# Patient Record
Sex: FEMALE | Race: WHITE | Employment: OTHER | URBAN - METROPOLITAN AREA
[De-identification: names, ages, dates, MRNs, and addresses within clinical notes are randomized per-mention and may not be internally consistent; named-entity substitution may affect disease eponyms.]

---

## 2017-02-21 PROBLEM — Z98.42 STATUS POST BILATERAL CATARACT EXTRACTION: Status: ACTIVE | Noted: 2017-02-21

## 2017-02-21 PROBLEM — Z98.41 STATUS POST BILATERAL CATARACT EXTRACTION: Status: ACTIVE | Noted: 2017-02-21

## 2017-04-28 PROBLEM — Z79.899 ENCOUNTER FOR LONG-TERM (CURRENT) USE OF HIGH-RISK MEDICATION: Status: ACTIVE | Noted: 2017-04-28

## 2018-02-27 PROBLEM — J41.0 SIMPLE CHRONIC BRONCHITIS (HCC): Status: ACTIVE | Noted: 2018-02-27

## 2018-02-27 PROBLEM — S93.492A SPRAIN OF POSTERIOR TALOFIBULAR LIGAMENT OF LEFT ANKLE, INITIAL ENCOUNTER: Status: ACTIVE | Noted: 2018-02-27

## 2018-03-14 PROBLEM — E66.01 MORBID OBESITY (HCC): Status: ACTIVE | Noted: 2018-03-14

## 2018-03-14 PROBLEM — J41.0 SIMPLE CHRONIC BRONCHITIS (HCC): Status: RESOLVED | Noted: 2018-02-27 | Resolved: 2018-03-14

## 2018-07-28 ENCOUNTER — APPOINTMENT (OUTPATIENT)
Dept: INTERVENTIONAL RADIOLOGY/VASCULAR | Facility: HOSPITAL | Age: 78
DRG: 286 | End: 2018-07-28
Attending: INTERNAL MEDICINE
Payer: MEDICARE

## 2018-07-28 ENCOUNTER — APPOINTMENT (OUTPATIENT)
Dept: GENERAL RADIOLOGY | Facility: HOSPITAL | Age: 78
DRG: 286 | End: 2018-07-28
Attending: EMERGENCY MEDICINE
Payer: MEDICARE

## 2018-07-28 ENCOUNTER — HOSPITAL ENCOUNTER (INPATIENT)
Facility: HOSPITAL | Age: 78
LOS: 2 days | Discharge: HOME | DRG: 286 | End: 2018-07-30
Attending: EMERGENCY MEDICINE | Admitting: HOSPITALIST
Payer: MEDICARE

## 2018-07-28 DIAGNOSIS — I21.3 ST ELEVATION MYOCARDIAL INFARCTION (STEMI), UNSPECIFIED ARTERY (HCC): Primary | ICD-10-CM

## 2018-07-28 LAB
ANION GAP SERPL CALC-SCNC: 8 MMOL/L (ref 0–18)
APTT PPP: 23.6 SECONDS (ref 23.2–35.3)
BASOPHILS # BLD: 0.1 K/UL (ref 0–0.2)
BASOPHILS NFR BLD: 1 %
BNP SERPL-MCNC: 126 PG/ML (ref 0–100)
BUN SERPL-MCNC: 13 MG/DL (ref 8–20)
BUN/CREAT SERPL: 16.9 (ref 10–20)
CALCIUM SERPL-MCNC: 9.7 MG/DL (ref 8.5–10.5)
CHLORIDE SERPL-SCNC: 109 MMOL/L (ref 95–110)
CHOLEST SERPL-MCNC: 153 MG/DL (ref 110–200)
CO2 SERPL-SCNC: 22 MMOL/L (ref 22–32)
CREAT SERPL-MCNC: 0.77 MG/DL (ref 0.5–1.5)
EOSINOPHIL # BLD: 0.1 K/UL (ref 0–0.7)
EOSINOPHIL NFR BLD: 2 %
ERYTHROCYTE [DISTWIDTH] IN BLOOD BY AUTOMATED COUNT: 13.1 % (ref 11–15)
GLUCOSE SERPL-MCNC: 108 MG/DL (ref 70–99)
HCT VFR BLD AUTO: 42.7 % (ref 35–48)
HDLC SERPL-MCNC: 64 MG/DL
HGB BLD-MCNC: 14.2 G/DL (ref 12–16)
INR BLD: 1 (ref 0.9–1.2)
LDLC SERPL CALC-MCNC: 68 MG/DL (ref 0–99)
LYMPHOCYTES # BLD: 0.4 K/UL (ref 1–4)
LYMPHOCYTES NFR BLD: 7 %
MAGNESIUM SERPL-MCNC: 1.9 MG/DL (ref 1.8–2.5)
MCH RBC QN AUTO: 31.3 PG (ref 27–32)
MCHC RBC AUTO-ENTMCNC: 33.2 G/DL (ref 32–37)
MCV RBC AUTO: 94.2 FL (ref 80–100)
MONOCYTES # BLD: 0.8 K/UL (ref 0–1)
MONOCYTES NFR BLD: 13 %
NEUTROPHILS # BLD AUTO: 4.7 K/UL (ref 1.8–7.7)
NEUTROPHILS NFR BLD: 77 %
NONHDLC SERPL-MCNC: 89 MG/DL
OSMOLALITY UR CALC.SUM OF ELEC: 289 MOSM/KG (ref 275–295)
PLATELET # BLD AUTO: 145 K/UL (ref 140–400)
PMV BLD AUTO: 8.7 FL (ref 7.4–10.3)
POTASSIUM SERPL-SCNC: 4.2 MMOL/L (ref 3.3–5.1)
PROTHROMBIN TIME: 12.7 SECONDS (ref 11.8–14.5)
RBC # BLD AUTO: 4.53 M/UL (ref 3.7–5.4)
SODIUM SERPL-SCNC: 139 MMOL/L (ref 136–144)
TRIGL SERPL-MCNC: 104 MG/DL (ref 1–149)
TROPONIN I SERPL-MCNC: 3.06 NG/ML (ref ?–0.03)
WBC # BLD AUTO: 6.1 K/UL (ref 4–11)

## 2018-07-28 PROCEDURE — 93005 ELECTROCARDIOGRAM TRACING: CPT

## 2018-07-28 PROCEDURE — 87641 MR-STAPH DNA AMP PROBE: CPT | Performed by: HOSPITALIST

## 2018-07-28 PROCEDURE — 99291 CRITICAL CARE FIRST HOUR: CPT

## 2018-07-28 PROCEDURE — 85730 THROMBOPLASTIN TIME PARTIAL: CPT | Performed by: EMERGENCY MEDICINE

## 2018-07-28 PROCEDURE — 83735 ASSAY OF MAGNESIUM: CPT | Performed by: EMERGENCY MEDICINE

## 2018-07-28 PROCEDURE — 93010 ELECTROCARDIOGRAM REPORT: CPT | Performed by: EMERGENCY MEDICINE

## 2018-07-28 PROCEDURE — 93010 ELECTROCARDIOGRAM REPORT: CPT | Performed by: INTERNAL MEDICINE

## 2018-07-28 PROCEDURE — 84484 ASSAY OF TROPONIN QUANT: CPT | Performed by: EMERGENCY MEDICINE

## 2018-07-28 PROCEDURE — 99152 MOD SED SAME PHYS/QHP 5/>YRS: CPT

## 2018-07-28 PROCEDURE — 96375 TX/PRO/DX INJ NEW DRUG ADDON: CPT

## 2018-07-28 PROCEDURE — 80061 LIPID PANEL: CPT | Performed by: EMERGENCY MEDICINE

## 2018-07-28 PROCEDURE — B2151ZZ FLUOROSCOPY OF LEFT HEART USING LOW OSMOLAR CONTRAST: ICD-10-PCS | Performed by: INTERNAL MEDICINE

## 2018-07-28 PROCEDURE — 4A023N7 MEASUREMENT OF CARDIAC SAMPLING AND PRESSURE, LEFT HEART, PERCUTANEOUS APPROACH: ICD-10-PCS | Performed by: INTERNAL MEDICINE

## 2018-07-28 PROCEDURE — 85610 PROTHROMBIN TIME: CPT | Performed by: EMERGENCY MEDICINE

## 2018-07-28 PROCEDURE — 96361 HYDRATE IV INFUSION ADD-ON: CPT

## 2018-07-28 PROCEDURE — B2111ZZ FLUOROSCOPY OF MULTIPLE CORONARY ARTERIES USING LOW OSMOLAR CONTRAST: ICD-10-PCS | Performed by: INTERNAL MEDICINE

## 2018-07-28 PROCEDURE — 99153 MOD SED SAME PHYS/QHP EA: CPT

## 2018-07-28 PROCEDURE — 83880 ASSAY OF NATRIURETIC PEPTIDE: CPT | Performed by: EMERGENCY MEDICINE

## 2018-07-28 PROCEDURE — 85025 COMPLETE CBC W/AUTO DIFF WBC: CPT | Performed by: EMERGENCY MEDICINE

## 2018-07-28 PROCEDURE — 93458 L HRT ARTERY/VENTRICLE ANGIO: CPT

## 2018-07-28 PROCEDURE — 96374 THER/PROPH/DIAG INJ IV PUSH: CPT

## 2018-07-28 PROCEDURE — A4216 STERILE WATER/SALINE, 10 ML: HCPCS | Performed by: HOSPITALIST

## 2018-07-28 PROCEDURE — 93567 NJX CAR CTH SPRVLV AORTGRPHY: CPT

## 2018-07-28 PROCEDURE — 80048 BASIC METABOLIC PNL TOTAL CA: CPT | Performed by: EMERGENCY MEDICINE

## 2018-07-28 RX ORDER — PREDNISONE 1 MG/1
5 TABLET ORAL DAILY
Status: DISCONTINUED | OUTPATIENT
Start: 2018-07-29 | End: 2018-07-30

## 2018-07-28 RX ORDER — SODIUM CHLORIDE 9 MG/ML
INJECTION, SOLUTION INTRAVENOUS CONTINUOUS
Status: ACTIVE | OUTPATIENT
Start: 2018-07-28 | End: 2018-07-28

## 2018-07-28 RX ORDER — MORPHINE SULFATE 2 MG/ML
2 INJECTION, SOLUTION INTRAMUSCULAR; INTRAVENOUS EVERY 2 HOUR PRN
Status: DISCONTINUED | OUTPATIENT
Start: 2018-07-28 | End: 2018-07-30

## 2018-07-28 RX ORDER — HEPARIN SODIUM AND DEXTROSE 10000; 5 [USP'U]/100ML; G/100ML
1000 INJECTION INTRAVENOUS ONCE
Status: DISCONTINUED | OUTPATIENT
Start: 2018-07-28 | End: 2018-07-28

## 2018-07-28 RX ORDER — BISACODYL 10 MG
10 SUPPOSITORY, RECTAL RECTAL
Status: DISCONTINUED | OUTPATIENT
Start: 2018-07-28 | End: 2018-07-30

## 2018-07-28 RX ORDER — ASPIRIN 81 MG/1
324 TABLET, CHEWABLE ORAL ONCE
Status: COMPLETED | OUTPATIENT
Start: 2018-07-28 | End: 2018-07-28

## 2018-07-28 RX ORDER — LIDOCAINE HYDROCHLORIDE 20 MG/ML
INJECTION, SOLUTION EPIDURAL; INFILTRATION; INTRACAUDAL; PERINEURAL
Status: COMPLETED
Start: 2018-07-28 | End: 2018-07-28

## 2018-07-28 RX ORDER — HEPARIN SODIUM 1000 [USP'U]/ML
INJECTION, SOLUTION INTRAVENOUS; SUBCUTANEOUS
Status: COMPLETED
Start: 2018-07-28 | End: 2018-07-28

## 2018-07-28 RX ORDER — ONDANSETRON 2 MG/ML
INJECTION INTRAMUSCULAR; INTRAVENOUS
Status: COMPLETED
Start: 2018-07-28 | End: 2018-07-28

## 2018-07-28 RX ORDER — SODIUM CHLORIDE 0.9 % (FLUSH) 0.9 %
3 SYRINGE (ML) INJECTION AS NEEDED
Status: DISCONTINUED | OUTPATIENT
Start: 2018-07-28 | End: 2018-07-30

## 2018-07-28 RX ORDER — LEFLUNOMIDE 20 MG/1
20 TABLET ORAL DAILY
Status: DISCONTINUED | OUTPATIENT
Start: 2018-07-29 | End: 2018-07-30

## 2018-07-28 RX ORDER — MIDAZOLAM HYDROCHLORIDE 1 MG/ML
INJECTION INTRAMUSCULAR; INTRAVENOUS
Status: COMPLETED
Start: 2018-07-28 | End: 2018-07-28

## 2018-07-28 RX ORDER — MORPHINE SULFATE 4 MG/ML
4 INJECTION, SOLUTION INTRAMUSCULAR; INTRAVENOUS EVERY 2 HOUR PRN
Status: DISCONTINUED | OUTPATIENT
Start: 2018-07-28 | End: 2018-07-30

## 2018-07-28 RX ORDER — ASPIRIN 81 MG/1
81 TABLET ORAL DAILY
Status: DISCONTINUED | OUTPATIENT
Start: 2018-07-29 | End: 2018-07-30

## 2018-07-28 RX ORDER — POLYETHYLENE GLYCOL 3350 17 G/17G
17 POWDER, FOR SOLUTION ORAL DAILY PRN
Status: DISCONTINUED | OUTPATIENT
Start: 2018-07-28 | End: 2018-07-30

## 2018-07-28 RX ORDER — ACETAMINOPHEN 325 MG/1
650 TABLET ORAL EVERY 6 HOURS PRN
Status: DISCONTINUED | OUTPATIENT
Start: 2018-07-28 | End: 2018-07-30

## 2018-07-28 RX ORDER — MORPHINE SULFATE 4 MG/ML
4 INJECTION, SOLUTION INTRAMUSCULAR; INTRAVENOUS ONCE
Status: COMPLETED | OUTPATIENT
Start: 2018-07-28 | End: 2018-07-28

## 2018-07-28 RX ORDER — DOPAMINE HYDROCHLORIDE 320 MG/100ML
INJECTION, SOLUTION INTRAVENOUS
Status: COMPLETED
Start: 2018-07-28 | End: 2018-07-28

## 2018-07-28 RX ORDER — HEPARIN SODIUM AND DEXTROSE 10000; 5 [USP'U]/100ML; G/100ML
INJECTION INTRAVENOUS CONTINUOUS
Status: CANCELLED | OUTPATIENT
Start: 2018-07-28

## 2018-07-28 RX ORDER — ONDANSETRON 2 MG/ML
4 INJECTION INTRAMUSCULAR; INTRAVENOUS EVERY 6 HOURS PRN
Status: DISCONTINUED | OUTPATIENT
Start: 2018-07-28 | End: 2018-07-30

## 2018-07-28 RX ORDER — ENOXAPARIN SODIUM 100 MG/ML
40 INJECTION SUBCUTANEOUS DAILY
Status: DISCONTINUED | OUTPATIENT
Start: 2018-07-29 | End: 2018-07-30

## 2018-07-28 RX ORDER — METOPROLOL TARTRATE 5 MG/5ML
5 INJECTION INTRAVENOUS ONCE
Status: COMPLETED | OUTPATIENT
Start: 2018-07-28 | End: 2018-07-28

## 2018-07-28 RX ORDER — NITROGLYCERIN 20 MG/100ML
INJECTION INTRAVENOUS CONTINUOUS
Status: DISCONTINUED | OUTPATIENT
Start: 2018-07-28 | End: 2018-07-28

## 2018-07-28 RX ORDER — ATORVASTATIN CALCIUM 20 MG/1
20 TABLET, FILM COATED ORAL
Status: DISCONTINUED | OUTPATIENT
Start: 2018-07-28 | End: 2018-07-30

## 2018-07-28 RX ORDER — FOLIC ACID 1 MG/1
1 TABLET ORAL DAILY
Status: DISCONTINUED | OUTPATIENT
Start: 2018-07-29 | End: 2018-07-30

## 2018-07-28 RX ORDER — HEPARIN SODIUM 1000 [USP'U]/ML
5000 INJECTION, SOLUTION INTRAVENOUS; SUBCUTANEOUS ONCE
Status: DISCONTINUED | OUTPATIENT
Start: 2018-07-28 | End: 2018-07-28

## 2018-07-28 RX ORDER — MORPHINE SULFATE 2 MG/ML
1 INJECTION, SOLUTION INTRAMUSCULAR; INTRAVENOUS EVERY 2 HOUR PRN
Status: DISCONTINUED | OUTPATIENT
Start: 2018-07-28 | End: 2018-07-30

## 2018-07-28 RX ORDER — NITROGLYCERIN 0.4 MG/1
0.4 TABLET SUBLINGUAL EVERY 5 MIN PRN
Status: DISCONTINUED | OUTPATIENT
Start: 2018-07-28 | End: 2018-07-30

## 2018-07-28 RX ORDER — SODIUM CHLORIDE 9 MG/ML
INJECTION, SOLUTION INTRAVENOUS
Status: COMPLETED
Start: 2018-07-28 | End: 2018-07-28

## 2018-07-28 RX ORDER — METOCLOPRAMIDE HYDROCHLORIDE 5 MG/ML
10 INJECTION INTRAMUSCULAR; INTRAVENOUS EVERY 8 HOURS PRN
Status: DISCONTINUED | OUTPATIENT
Start: 2018-07-28 | End: 2018-07-30

## 2018-07-28 NOTE — PROCEDURES
CATH NOTE DICTATED:  [id number]    BRIEF CATH NOTE:    LVEF 30% WITH ANTEROAPICAL SEVERE HYPOKINESIS    CORONARIES:    LAD-30%  CFX 40%  RCA-50-60%    CONCIOUS SEDATION UNDER MY DIRECT AND IMMEADIATE SUPERVISION FOR 32 MINUTES:  14:13-14:45      IMP:    SONJA

## 2018-07-28 NOTE — CONSULTS
CONSULT DICTATED:    [id number]    IMP:    SSCP  EKG C/W ACUTE MI  CP STARTED AFTER INTENSE ARGUMENT WITH BROTHER  BDLINE BP    PLAN :    Urgent cardiac cath  Catheterization and angiography to define anatomy and guide further therapy.  I have reviewed the pr

## 2018-07-28 NOTE — RESPIRATORY THERAPY NOTE
JIAN ASSESSMENT:    Pt does have a previous diagnosis of JIAN. Pt does routinely use a CPAP device at home. Pt's own cpap device at bed side for night time use. RN aware.

## 2018-07-28 NOTE — PROGRESS NOTES
S/p cardiac cath. Pt on dopamine for hypotension. Intermittent nausea with emesis of bile shahida when turned or moved. Med with zofran in cath lab. Bp maintained in mid to upper 80's to low 90's on 3 mcg dopamine but pt still nauseated.  At one point she

## 2018-07-28 NOTE — CODE DOCUMENTATION
Pt reporting decreased pain post med administration.  Heparin deferred until further notice per ED MD. Awaiting arrival of cath team

## 2018-07-28 NOTE — H&P
[facility] Hospitalist H&P       CC: Patient presents with:  Chest Pain Angina (cardiovascular)       PCP: Zora Bernal MD    ASSESSMENT / PLAN:   Patient is a 69year old female with PMH sig for breast ca, DVT, HLD, PMR, RA, JIAN on cpap who presents with a c/co PMR (polymyalgia rheumatica) (HCC)    • SLEEP APNEA     severe, , O2 ning 85%, AutoPAP 6-20 supplies [facility]  Past Surgical History:  No date: CHOLECYSTECTOMY  2006: COLONOSCOPY      Comment: colon polyps  8/29/2011: Katelyn Keith Location: Right arm)   Pulse 102   Temp 97 °F (36.1 °C) (Temporal)   Resp 15   Ht 172.7 cm (5' 8\")   Wt 260 lb (117.9 kg)   SpO2 91%   BMI 39.53 kg/m²   General:  Alert, no distress   Head:  Normocephalic, without obvious abnormality, atraumatic.    Eyes: 13   CREATSERUM  0.77   GLU  108*   CA  9.7   MG  1.9       No results for input(s): ALT, AST, ALB, AMYLASE, LIPASE, LDH in the last 168 hours.     Invalid input(s): ALPHOS, TBIL, DBIL, TPROT      Recent Labs   Lab  07/28/18   1314   TROP  3.06*         Rad

## 2018-07-28 NOTE — ED PROVIDER NOTES
Patient Seen in: [facility] 2w/sw    History   Patient presents with:  Chest Pain Angina (cardiovascular)    Stated Complaint: chest pain. HPI    58-year-old female presents for complaint of chest heaviness for the past 3 hours.   She has had as chest pain. Other systems are as noted in HPI. Constitutional and vital signs reviewed. All other systems reviewed and negative except as noted above.     Physical Exam   ED Triage Vitals [07/28/18 1302]  BP: 152/90  Pulse: 91  Resp: 20  Temp: 98.1 ° All other components within normal limits   CBC W/ DIFFERENTIAL - Abnormal; Notable for the following:     Lymphocyte Absolute 0.4 (*)     All other components within normal limits   MAGNESIUM - Normal   PROTHROMBIN TIME (PT) - Normal   PTT, ACTIVATED - Admission disposition: 7/28/2018  1:55 PM                 Disposition and Plan     Clinical Impression:  ST elevation myocardial infarction (STEMI), unspecified artery ([facility] [address]  (primary encounter diagnosis)    Disposition:  Admit  7/28/2018  1:55 pm    F

## 2018-07-28 NOTE — PROCEDURES
[facility]    PATIENT'S NAME: Kaia Jones   ATTENDING PHYSICIAN: Amrit Jiménez DO   OPERATING PHYSICIAN: Juan Heredia MD   PATIENT ACCOUNT#:   [id number]    LOCATION:  [address] [place] [address] #:   [id number]       DATE OF BIRTH: marginal that had only mild plaquing. The second obtuse marginal had trivial plaquing. The third obtuse marginal had mild plaquing and was a large vessel that gave off the PDA.   The true circumflex around the AV groove was otherwise normal.  Left anterio

## 2018-07-29 ENCOUNTER — APPOINTMENT (OUTPATIENT)
Dept: CV DIAGNOSTICS | Facility: HOSPITAL | Age: 78
DRG: 286 | End: 2018-07-29
Attending: INTERNAL MEDICINE
Payer: MEDICARE

## 2018-07-29 LAB
ANION GAP SERPL CALC-SCNC: 2 MMOL/L (ref 0–18)
BASOPHILS # BLD: 0 K/UL (ref 0–0.2)
BASOPHILS NFR BLD: 1 %
BUN SERPL-MCNC: 14 MG/DL (ref 8–20)
BUN/CREAT SERPL: 18.2 (ref 10–20)
CALCIUM SERPL-MCNC: 8.8 MG/DL (ref 8.5–10.5)
CHLORIDE SERPL-SCNC: 109 MMOL/L (ref 95–110)
CO2 SERPL-SCNC: 27 MMOL/L (ref 22–32)
CREAT SERPL-MCNC: 0.77 MG/DL (ref 0.5–1.5)
EOSINOPHIL # BLD: 0.3 K/UL (ref 0–0.7)
EOSINOPHIL NFR BLD: 6 %
ERYTHROCYTE [DISTWIDTH] IN BLOOD BY AUTOMATED COUNT: 13.4 % (ref 11–15)
GLUCOSE SERPL-MCNC: 85 MG/DL (ref 70–99)
HCT VFR BLD AUTO: 37.4 % (ref 35–48)
HGB BLD-MCNC: 12.6 G/DL (ref 12–16)
LYMPHOCYTES # BLD: 0.6 K/UL (ref 1–4)
LYMPHOCYTES NFR BLD: 12 %
MAGNESIUM SERPL-MCNC: 2 MG/DL (ref 1.8–2.5)
MCH RBC QN AUTO: 31.5 PG (ref 27–32)
MCHC RBC AUTO-ENTMCNC: 33.5 G/DL (ref 32–37)
MCV RBC AUTO: 93.8 FL (ref 80–100)
MONOCYTES # BLD: 0.7 K/UL (ref 0–1)
MONOCYTES NFR BLD: 14 %
MRSA DNA SPEC QL NAA+PROBE: NEGATIVE
NEUTROPHILS # BLD AUTO: 3.4 K/UL (ref 1.8–7.7)
NEUTROPHILS NFR BLD: 67 %
OSMOLALITY UR CALC.SUM OF ELEC: 286 MOSM/KG (ref 275–295)
PLATELET # BLD AUTO: 138 K/UL (ref 140–400)
PMV BLD AUTO: 9.5 FL (ref 7.4–10.3)
POTASSIUM SERPL-SCNC: 4.3 MMOL/L (ref 3.3–5.1)
RBC # BLD AUTO: 3.99 M/UL (ref 3.7–5.4)
SODIUM SERPL-SCNC: 138 MMOL/L (ref 136–144)
WBC # BLD AUTO: 5 K/UL (ref 4–11)

## 2018-07-29 PROCEDURE — 80048 BASIC METABOLIC PNL TOTAL CA: CPT | Performed by: HOSPITALIST

## 2018-07-29 PROCEDURE — 85025 COMPLETE CBC W/AUTO DIFF WBC: CPT | Performed by: HOSPITALIST

## 2018-07-29 PROCEDURE — A4216 STERILE WATER/SALINE, 10 ML: HCPCS | Performed by: HOSPITALIST

## 2018-07-29 PROCEDURE — 83735 ASSAY OF MAGNESIUM: CPT | Performed by: HOSPITALIST

## 2018-07-29 RX ORDER — METOPROLOL SUCCINATE 25 MG/1
12.5 TABLET, EXTENDED RELEASE ORAL
Status: DISCONTINUED | OUTPATIENT
Start: 2018-07-29 | End: 2018-07-30

## 2018-07-29 NOTE — PROGRESS NOTES
[facility] [address] [place] [address] Cardiology Progress Note        Roquemathew Panfilo Patient Status:  Inpatient    1940 MRN [id number]   [facility] 2W/SW Attending Hardy Ashraf, [address] [facility] Day # 1 PCP Hank Meyer MD     Subjective No results for input(s): ALT, AST, ALB, AMYLASE, LIPASE, LDH in the last 168 hours.     Invalid input(s): ALPHOS, TBIL, DBIL, TPROT    Recent Labs   Lab  07/28/18   1314   TROP  3.06*       Recent Labs   Lab  07/28/18   1314   PTP  12.7   INR  1.0

## 2018-07-29 NOTE — CONSULTS
[facility]    PATIENT'S NAME: Manfred Zaman   ATTENDING PHYSICIAN: Tran Larsen DO   CONSULTING PHYSICIAN: Sonjai K. Reyes Aldo, MD   PATIENT ACCOUNT#:   [id number]    LOCATION:  [address] [facility] [address] #:   [id number]       DATE OF BIRTH As above. PAST SURGICAL HISTORY:  As above. MEDICATIONS:  At the present time, simvastatin, prednisone, folic acid, aspirin. ALLERGIES:  Diflucan. SOCIAL HISTORY:  She does not smoke or use alcohol. Lives alone.     FAMILY HISTORY:  Negative fo

## 2018-07-29 NOTE — PROGRESS NOTES
Patient received to room 313 from PCCU room 235. Report received from [facility], RN/PCCU. Patient A/Ox4, VSS, denies discomfort. Up in chair. Oriented patient to room, reviewed plan of care. Resting quietly at this time.

## 2018-07-29 NOTE — PROGRESS NOTES
Therapeutic interchange simvastatin (ZOCOR) tab 40 mg daily with atorvastatin 20 mg daily. Gretta AMEZQUITA Ph.  [id number]  7/28/2018

## 2018-07-29 NOTE — PLAN OF CARE
CARDIOVASCULAR - ADULT    • Absence of cardiac arrhythmias or at baseline Progressing        DISCHARGE PLANNING    • Discharge to home or other facility with appropriate resources Progressing        GASTROINTESTINAL - ADULT    • Minimal or absence of nause

## 2018-07-29 NOTE — PLAN OF CARE
Absence of cardiac arrhythmias or at baseline Progressing    Pt had been having frequent pac's and pvc's with dopamine. Bolus of ns given and dopamine off. In sinus rhythm.    Minimal or absence of nausea and vomiting Progressing    N/v appears resolved s

## 2018-07-29 NOTE — PROGRESS NOTES
[facility] Hospitalist Progress Note     CC: Hospital Follow up    PCP: Antony Vanessa MD       Assessment/Plan:   Patient is a 69year old female with PMH sig for breast ca, DVT, HLD, PMR, RA, JIAN on cpap who presents with a c/co of chest pain.      ST elevation o data filed at 07/28/18 2150   Gross per 24 hour   Intake             1120 ml   Output              700 ml   Net              420 ml       Last 3 Weights  07/28/18 1302 : 260 lb (117.9 kg)  07/19/18 1117 : 260 lb (117.9 kg)  06/13/18 1500 : 262 lb 12.8 oz (

## 2018-07-30 ENCOUNTER — APPOINTMENT (OUTPATIENT)
Dept: CV DIAGNOSTICS | Facility: HOSPITAL | Age: 78
DRG: 286 | End: 2018-07-30
Attending: INTERNAL MEDICINE
Payer: MEDICARE

## 2018-07-30 VITALS
TEMPERATURE: 98 F | HEART RATE: 56 BPM | DIASTOLIC BLOOD PRESSURE: 69 MMHG | SYSTOLIC BLOOD PRESSURE: 116 MMHG | RESPIRATION RATE: 18 BRPM | BODY MASS INDEX: 40.13 KG/M2 | HEIGHT: 68 IN | WEIGHT: 264.81 LBS | OXYGEN SATURATION: 97 %

## 2018-07-30 LAB
ANION GAP SERPL CALC-SCNC: 3 MMOL/L (ref 0–18)
BASOPHILS # BLD: 0 K/UL (ref 0–0.2)
BASOPHILS NFR BLD: 1 %
BUN SERPL-MCNC: 15 MG/DL (ref 8–20)
BUN/CREAT SERPL: 19.7 (ref 10–20)
CALCIUM SERPL-MCNC: 8.8 MG/DL (ref 8.5–10.5)
CHLORIDE SERPL-SCNC: 111 MMOL/L (ref 95–110)
CO2 SERPL-SCNC: 26 MMOL/L (ref 22–32)
CREAT SERPL-MCNC: 0.76 MG/DL (ref 0.5–1.5)
EOSINOPHIL # BLD: 0.2 K/UL (ref 0–0.7)
EOSINOPHIL NFR BLD: 6 %
ERYTHROCYTE [DISTWIDTH] IN BLOOD BY AUTOMATED COUNT: 13.2 % (ref 11–15)
GLUCOSE SERPL-MCNC: 79 MG/DL (ref 70–99)
HCT VFR BLD AUTO: 38.1 % (ref 35–48)
HGB BLD-MCNC: 12.9 G/DL (ref 12–16)
LYMPHOCYTES # BLD: 0.7 K/UL (ref 1–4)
LYMPHOCYTES NFR BLD: 17 %
MAGNESIUM SERPL-MCNC: 2 MG/DL (ref 1.8–2.5)
MCH RBC QN AUTO: 31.3 PG (ref 27–32)
MCHC RBC AUTO-ENTMCNC: 33.8 G/DL (ref 32–37)
MCV RBC AUTO: 92.9 FL (ref 80–100)
MONOCYTES # BLD: 0.7 K/UL (ref 0–1)
MONOCYTES NFR BLD: 16 %
NEUTROPHILS # BLD AUTO: 2.5 K/UL (ref 1.8–7.7)
NEUTROPHILS NFR BLD: 60 %
OSMOLALITY UR CALC.SUM OF ELEC: 290 MOSM/KG (ref 275–295)
PLATELET # BLD AUTO: 145 K/UL (ref 140–400)
PMV BLD AUTO: 9 FL (ref 7.4–10.3)
POTASSIUM SERPL-SCNC: 4.1 MMOL/L (ref 3.3–5.1)
RBC # BLD AUTO: 4.1 M/UL (ref 3.7–5.4)
SODIUM SERPL-SCNC: 140 MMOL/L (ref 136–144)
WBC # BLD AUTO: 4.2 K/UL (ref 4–11)

## 2018-07-30 PROCEDURE — 85025 COMPLETE CBC W/AUTO DIFF WBC: CPT | Performed by: HOSPITALIST

## 2018-07-30 PROCEDURE — 83735 ASSAY OF MAGNESIUM: CPT | Performed by: HOSPITALIST

## 2018-07-30 PROCEDURE — 80048 BASIC METABOLIC PNL TOTAL CA: CPT | Performed by: HOSPITALIST

## 2018-07-30 RX ORDER — SIMVASTATIN 40 MG
40 TABLET ORAL DAILY
Qty: 30 TABLET | Refills: 0 | Status: SHIPPED | OUTPATIENT
Start: 2018-07-30 | End: 2018-08-29

## 2018-07-30 RX ORDER — METOPROLOL SUCCINATE 25 MG/1
12.5 TABLET, EXTENDED RELEASE ORAL
Qty: 15 TABLET | Refills: 0 | Status: SHIPPED | OUTPATIENT
Start: 2018-07-31 | End: 2018-08-29

## 2018-07-30 NOTE — DISCHARGE SUMMARY
[facility] Internal Medicine Discharge Summary   Patient ID:  Brandy Abbott  [id number]  69year old  12/30/1940    Admit date: 7/28/2018    Discharge date and time: 7/30/2018     Attending Physician: Miguel Brock    Primary Care Physician: Primo Pino ef vs. Versed/fentanyl, also possibly vagal per cardiology  -weaned off of dopamine gtt (had severe n/v on this)     Asymptomatic bradycardia- resolved  -poss vagal response during emesis episodes     Acute resp failure with hypoxia  -resolved, on RA     [address] [address]    Phone:  [phone]   · Metoprolol Succinate ER 25 MG Tb24  · simvastatin 40 MG Tabs         Consults: IP CONSULT TO CARDIOLOGY  IP CONSULT TO HOSPITALIST  IP CONSULT TO CARDIAC REHAB    Radiology: No results

## 2018-07-30 NOTE — CARDIAC REHAB
Cardiac Rehab Phase I    Activity:  Distance :Per self  Assistance needed :No  Patient tolerated activity :Well per patient. Education:  Handouts provided and reviewed: [address] Diet: Healthy Cardiac diet reviewed.     Disease

## 2018-07-30 NOTE — PROGRESS NOTES
[facility] Cardiology Progress Note        Merlin Pipes Patient Status:  Inpatient    1940 MRN [id number]   [facility] 2W/SW Attending Trinity Riddle, [address] [facility] Day # 2 PCP Juliette Shahid MD     Subjective 22  27  26   BUN  13  14  15   CREATSERUM  0.77  0.77  0.76   CA  9.7  8.8  8.8   MG  1.9  2.0  2.0   GLU  108*  85  79       No results for input(s): ALT, AST, ALB, AMYLASE, LIPASE, LDH in the last 168 hours.     Invalid input(s): ALPHOS, TBIL, DBIL, TPROT

## 2018-07-30 NOTE — PLAN OF CARE
CARDIOVASCULAR - ADULT    • Absence of cardiac arrhythmias or at baseline Adequate for Discharge        DISCHARGE PLANNING    • Discharge to home or other facility with appropriate resources Adequate for Discharge        GASTROINTESTINAL - ADULT    • Minim

## 2018-07-30 NOTE — CARDIAC REHAB
CARDIAC REHAB HEART FAILURE EDUCATION    Handouts provided and reviewed: CHF Booklet. Activity: Chair for all meals: Yes       Ambulation: Yes       Tolerated Activity: Well per patient         Disease Process: Disease process reviewed.     Reviewed the

## 2018-07-31 ENCOUNTER — TELEPHONE (OUTPATIENT)
Dept: CARDIOLOGY UNIT | Facility: HOSPITAL | Age: 78
End: 2018-07-31

## 2018-08-02 ENCOUNTER — OFFICE VISIT (OUTPATIENT)
Dept: CARDIOLOGY CLINIC | Facility: HOSPITAL | Age: 78
End: 2018-08-02
Attending: CLINICAL NURSE SPECIALIST
Payer: MEDICARE

## 2018-08-02 VITALS
DIASTOLIC BLOOD PRESSURE: 75 MMHG | OXYGEN SATURATION: 95 % | WEIGHT: 263.31 LBS | BODY MASS INDEX: 40 KG/M2 | SYSTOLIC BLOOD PRESSURE: 126 MMHG | HEART RATE: 64 BPM

## 2018-08-02 PROBLEM — I42.8 NICM (NONISCHEMIC CARDIOMYOPATHY) (HCC): Status: ACTIVE | Noted: 2018-08-02

## 2018-08-02 PROBLEM — I50.23 ACUTE ON CHRONIC SYSTOLIC HEART FAILURE (HCC): Status: ACTIVE | Noted: 2018-08-02

## 2018-08-02 PROCEDURE — 99211 OFF/OP EST MAY X REQ PHY/QHP: CPT | Performed by: CLINICAL NURSE SPECIALIST

## 2018-08-02 PROCEDURE — 99214 OFFICE O/P EST MOD 30 MIN: CPT | Performed by: CLINICAL NURSE SPECIALIST

## 2018-08-02 NOTE — PROGRESS NOTES
[facility] Patient Status:  Outpatient    1940 MRN [id number]   Location MD Dr Yumiko Pressley is a 69year old female who presents to 01:14 PM       Clinical labs drawn by MA: NA    /75   Pulse 64   Wt 263 lb 4.8 oz (119.4 kg)   SpO2 95%   BMI 40.03 kg/m²     D'c weight 264  Clinic weights: 1) 263  Home weight:  1) 262    General appearance: alert, appears stated age and cooperativ fluid restriction    Less than 2000 mg sodium/salt diet.  Common high sodium foods include frozen dinners, soups (not homemade), some cereal, vegetable juice, canned vegetables, lunch meats, processed meats like hotdogs, sausage, coelho, pepperoni, soy sauce

## 2018-08-02 NOTE — PATIENT INSTRUCTIONS
Continue current medications    Continue tracking daily weights. Call with weight gain of 3 lbs overnight or concerning symptoms. [phone]    32-52 oz fluid restriction    Less than 2000 mg sodium/salt diet.  Common high sodium foods include frozen di

## 2018-08-13 ENCOUNTER — TELEPHONE (OUTPATIENT)
Dept: MEDSURG UNIT | Facility: HOSPITAL | Age: 78
End: 2018-08-13

## 2018-08-17 ENCOUNTER — CARDPULM VISIT (OUTPATIENT)
Dept: CARDIAC REHAB | Facility: HOSPITAL | Age: 78
End: 2018-08-17
Attending: INTERNAL MEDICINE
Payer: MEDICARE

## 2018-08-17 ENCOUNTER — OFFICE VISIT (OUTPATIENT)
Dept: CARDIOLOGY CLINIC | Facility: HOSPITAL | Age: 78
End: 2018-08-17
Attending: INTERNAL MEDICINE
Payer: MEDICARE

## 2018-08-17 VITALS
SYSTOLIC BLOOD PRESSURE: 109 MMHG | OXYGEN SATURATION: 95 % | RESPIRATION RATE: 18 BRPM | DIASTOLIC BLOOD PRESSURE: 64 MMHG | HEART RATE: 77 BPM | WEIGHT: 264.31 LBS | BODY MASS INDEX: 40 KG/M2

## 2018-08-17 DIAGNOSIS — I50.9 HEART FAILURE, UNSPECIFIED (HCC): Primary | ICD-10-CM

## 2018-08-17 DIAGNOSIS — I50.23 ACUTE ON CHRONIC SYSTOLIC HEART FAILURE (HCC): ICD-10-CM

## 2018-08-17 LAB
ANION GAP SERPL CALC-SCNC: 9 MMOL/L (ref 0–18)
BUN SERPL-MCNC: 15 MG/DL (ref 8–20)
BUN/CREAT SERPL: 18.5 (ref 10–20)
CALCIUM SERPL-MCNC: 9.2 MG/DL (ref 8.5–10.5)
CHLORIDE SERPL-SCNC: 107 MMOL/L (ref 95–110)
CO2 SERPL-SCNC: 23 MMOL/L (ref 22–32)
CREAT SERPL-MCNC: 0.81 MG/DL (ref 0.5–1.5)
GLUCOSE SERPL-MCNC: 134 MG/DL (ref 70–99)
OSMOLALITY UR CALC.SUM OF ELEC: 291 MOSM/KG (ref 275–295)
POTASSIUM SERPL-SCNC: 4.3 MMOL/L (ref 3.3–5.1)
SODIUM SERPL-SCNC: 139 MMOL/L (ref 136–144)

## 2018-08-17 PROCEDURE — 99214 OFFICE O/P EST MOD 30 MIN: CPT | Performed by: CLINICAL NURSE SPECIALIST

## 2018-08-17 PROCEDURE — 36415 COLL VENOUS BLD VENIPUNCTURE: CPT | Performed by: CLINICAL NURSE SPECIALIST

## 2018-08-17 PROCEDURE — 80048 BASIC METABOLIC PNL TOTAL CA: CPT | Performed by: CLINICAL NURSE SPECIALIST

## 2018-08-17 PROCEDURE — 99211 OFF/OP EST MAY X REQ PHY/QHP: CPT | Performed by: CLINICAL NURSE SPECIALIST

## 2018-08-17 RX ORDER — LISINOPRIL 2.5 MG/1
2.5 TABLET ORAL NIGHTLY
Qty: 30 TABLET | Refills: 0 | Status: SHIPPED | OUTPATIENT
Start: 2018-08-17 | End: 2018-09-11

## 2018-08-17 NOTE — PROGRESS NOTES
[facility] Patient Status:  Outpatient    1940 MRN [id number]   Location MD Dr Sandy James is a 69year old female who presents to 09:17 AM   MG 2.0 07/30/2018 06:50 AM   TROP 3.06 (HH) 07/28/2018 01:14 PM       Clinical labs drawn by MA: NA    /64   Pulse 77   Resp 18   Wt 264 lb 4.8 oz (119.9 kg)   SpO2 95%   BMI 40.19 kg/m²     D'c weight 264  Clinic weights: 1) 263 2) 264  H night    Continue tracking daily weights. Call with weight gain of 3 lbs overnight or concerning symptoms. [phone]    32-52 oz fluid restriction    Less than 2000 mg sodium/salt diet.  Common high sodium foods include frozen dinners, soups (not homem

## 2018-08-17 NOTE — PATIENT INSTRUCTIONS
Please start lisinopril 2.5 mg at night    Continue tracking daily weights. Call with weight gain of 3 lbs overnight or concerning symptoms. [phone]    32-52 oz fluid restriction    Less than 2000 mg sodium/salt diet.  Common high sodium foods includ

## 2018-09-05 ENCOUNTER — APPOINTMENT (OUTPATIENT)
Dept: CARDIAC REHAB | Facility: HOSPITAL | Age: 78
End: 2018-09-05
Attending: INTERNAL MEDICINE
Payer: MEDICARE

## 2018-09-11 PROBLEM — I25.10 CAD IN NATIVE ARTERY: Status: ACTIVE | Noted: 2018-09-11

## 2018-09-11 PROBLEM — I51.81 TAKOTSUBO CARDIOMYOPATHY: Status: ACTIVE | Noted: 2018-08-02

## 2018-09-12 ENCOUNTER — APPOINTMENT (OUTPATIENT)
Dept: CARDIAC REHAB | Facility: HOSPITAL | Age: 78
End: 2018-09-12
Attending: INTERNAL MEDICINE
Payer: MEDICARE

## 2018-09-14 ENCOUNTER — APPOINTMENT (OUTPATIENT)
Dept: CARDIAC REHAB | Facility: HOSPITAL | Age: 78
End: 2018-09-14
Attending: INTERNAL MEDICINE
Payer: MEDICARE

## 2018-09-17 ENCOUNTER — APPOINTMENT (OUTPATIENT)
Dept: CARDIAC REHAB | Facility: HOSPITAL | Age: 78
End: 2018-09-17
Attending: INTERNAL MEDICINE
Payer: MEDICARE

## 2018-09-19 ENCOUNTER — APPOINTMENT (OUTPATIENT)
Dept: CARDIAC REHAB | Facility: HOSPITAL | Age: 78
End: 2018-09-19
Attending: INTERNAL MEDICINE
Payer: MEDICARE

## 2018-09-21 ENCOUNTER — APPOINTMENT (OUTPATIENT)
Dept: CARDIAC REHAB | Facility: HOSPITAL | Age: 78
End: 2018-09-21
Attending: INTERNAL MEDICINE
Payer: MEDICARE

## 2018-09-24 ENCOUNTER — APPOINTMENT (OUTPATIENT)
Dept: CARDIAC REHAB | Facility: HOSPITAL | Age: 78
End: 2018-09-24
Attending: INTERNAL MEDICINE
Payer: MEDICARE

## 2018-09-26 ENCOUNTER — APPOINTMENT (OUTPATIENT)
Dept: CARDIAC REHAB | Facility: HOSPITAL | Age: 78
End: 2018-09-26
Attending: INTERNAL MEDICINE
Payer: MEDICARE

## 2018-09-28 ENCOUNTER — APPOINTMENT (OUTPATIENT)
Dept: CARDIAC REHAB | Facility: HOSPITAL | Age: 78
End: 2018-09-28
Attending: INTERNAL MEDICINE
Payer: MEDICARE

## 2018-10-01 ENCOUNTER — APPOINTMENT (OUTPATIENT)
Dept: CARDIAC REHAB | Facility: HOSPITAL | Age: 78
End: 2018-10-01
Attending: INTERNAL MEDICINE
Payer: MEDICARE

## 2018-10-03 ENCOUNTER — APPOINTMENT (OUTPATIENT)
Dept: CARDIAC REHAB | Facility: HOSPITAL | Age: 78
End: 2018-10-03
Attending: INTERNAL MEDICINE
Payer: MEDICARE

## 2018-10-05 ENCOUNTER — APPOINTMENT (OUTPATIENT)
Dept: CARDIAC REHAB | Facility: HOSPITAL | Age: 78
End: 2018-10-05
Attending: INTERNAL MEDICINE
Payer: MEDICARE

## 2018-10-08 ENCOUNTER — APPOINTMENT (OUTPATIENT)
Dept: CARDIAC REHAB | Facility: HOSPITAL | Age: 78
End: 2018-10-08
Attending: INTERNAL MEDICINE
Payer: MEDICARE

## 2018-10-10 ENCOUNTER — APPOINTMENT (OUTPATIENT)
Dept: CARDIAC REHAB | Facility: HOSPITAL | Age: 78
End: 2018-10-10
Attending: INTERNAL MEDICINE
Payer: MEDICARE

## 2018-10-12 ENCOUNTER — APPOINTMENT (OUTPATIENT)
Dept: CARDIAC REHAB | Facility: HOSPITAL | Age: 78
End: 2018-10-12
Attending: INTERNAL MEDICINE
Payer: MEDICARE

## 2018-10-15 ENCOUNTER — APPOINTMENT (OUTPATIENT)
Dept: CARDIAC REHAB | Facility: HOSPITAL | Age: 78
End: 2018-10-15
Attending: INTERNAL MEDICINE
Payer: MEDICARE

## 2018-10-17 ENCOUNTER — APPOINTMENT (OUTPATIENT)
Dept: CARDIAC REHAB | Facility: HOSPITAL | Age: 78
End: 2018-10-17
Attending: INTERNAL MEDICINE
Payer: MEDICARE

## 2018-10-19 ENCOUNTER — APPOINTMENT (OUTPATIENT)
Dept: CARDIAC REHAB | Facility: HOSPITAL | Age: 78
End: 2018-10-19
Attending: INTERNAL MEDICINE
Payer: MEDICARE

## 2018-10-22 ENCOUNTER — APPOINTMENT (OUTPATIENT)
Dept: CARDIAC REHAB | Facility: HOSPITAL | Age: 78
End: 2018-10-22
Attending: INTERNAL MEDICINE
Payer: MEDICARE

## 2018-10-24 ENCOUNTER — APPOINTMENT (OUTPATIENT)
Dept: CARDIAC REHAB | Facility: HOSPITAL | Age: 78
End: 2018-10-24
Attending: INTERNAL MEDICINE
Payer: MEDICARE

## 2018-10-26 ENCOUNTER — APPOINTMENT (OUTPATIENT)
Dept: CARDIAC REHAB | Facility: HOSPITAL | Age: 78
End: 2018-10-26
Attending: INTERNAL MEDICINE
Payer: MEDICARE

## 2018-10-29 ENCOUNTER — APPOINTMENT (OUTPATIENT)
Dept: CARDIAC REHAB | Facility: HOSPITAL | Age: 78
End: 2018-10-29
Attending: INTERNAL MEDICINE
Payer: MEDICARE

## 2018-10-31 ENCOUNTER — APPOINTMENT (OUTPATIENT)
Dept: CARDIAC REHAB | Facility: HOSPITAL | Age: 78
End: 2018-10-31
Attending: INTERNAL MEDICINE
Payer: MEDICARE

## 2018-11-02 ENCOUNTER — APPOINTMENT (OUTPATIENT)
Dept: CARDIAC REHAB | Facility: HOSPITAL | Age: 78
End: 2018-11-02
Attending: INTERNAL MEDICINE
Payer: MEDICARE

## 2018-11-05 ENCOUNTER — APPOINTMENT (OUTPATIENT)
Dept: CARDIAC REHAB | Facility: HOSPITAL | Age: 78
End: 2018-11-05
Attending: INTERNAL MEDICINE
Payer: MEDICARE

## 2018-11-07 ENCOUNTER — APPOINTMENT (OUTPATIENT)
Dept: CARDIAC REHAB | Facility: HOSPITAL | Age: 78
End: 2018-11-07
Attending: INTERNAL MEDICINE
Payer: MEDICARE

## 2018-11-09 ENCOUNTER — APPOINTMENT (OUTPATIENT)
Dept: CARDIAC REHAB | Facility: HOSPITAL | Age: 78
End: 2018-11-09
Attending: INTERNAL MEDICINE
Payer: MEDICARE

## 2018-11-12 ENCOUNTER — APPOINTMENT (OUTPATIENT)
Dept: CARDIAC REHAB | Facility: HOSPITAL | Age: 78
End: 2018-11-12
Attending: INTERNAL MEDICINE
Payer: MEDICARE

## 2018-11-14 ENCOUNTER — APPOINTMENT (OUTPATIENT)
Dept: CARDIAC REHAB | Facility: HOSPITAL | Age: 78
End: 2018-11-14
Attending: INTERNAL MEDICINE
Payer: MEDICARE

## 2018-11-16 ENCOUNTER — APPOINTMENT (OUTPATIENT)
Dept: CARDIAC REHAB | Facility: HOSPITAL | Age: 78
End: 2018-11-16
Attending: INTERNAL MEDICINE
Payer: MEDICARE

## 2018-11-19 ENCOUNTER — APPOINTMENT (OUTPATIENT)
Dept: CARDIAC REHAB | Facility: HOSPITAL | Age: 78
End: 2018-11-19
Attending: INTERNAL MEDICINE
Payer: MEDICARE

## 2018-11-20 RX ORDER — METOPROLOL SUCCINATE 25 MG/1
TABLET, EXTENDED RELEASE ORAL
Qty: 45 TABLET | Refills: 0 | OUTPATIENT
Start: 2018-11-20

## 2019-01-16 PROBLEM — R57.0 CARDIOGENIC SHOCK (HCC): Status: ACTIVE | Noted: 2019-01-16

## 2019-01-20 PROBLEM — R57.0 CARDIOGENIC SHOCK (HCC): Status: RESOLVED | Noted: 2019-01-16 | Resolved: 2019-01-20

## 2019-06-25 ENCOUNTER — APPOINTMENT (OUTPATIENT)
Dept: GENERAL RADIOLOGY | Facility: HOSPITAL | Age: 79
End: 2019-06-25
Attending: EMERGENCY MEDICINE
Payer: MEDICARE

## 2019-06-25 ENCOUNTER — HOSPITAL ENCOUNTER (OUTPATIENT)
Age: 79
Discharge: ANOTHER HOSPITAL | End: 2019-06-25
Attending: EMERGENCY MEDICINE
Payer: MEDICARE

## 2019-06-25 ENCOUNTER — HOSPITAL ENCOUNTER (EMERGENCY)
Facility: HOSPITAL | Age: 79
Discharge: HOME | End: 2019-06-25
Attending: EMERGENCY MEDICINE
Payer: MEDICARE

## 2019-06-25 ENCOUNTER — APPOINTMENT (OUTPATIENT)
Dept: MRI IMAGING | Facility: HOSPITAL | Age: 79
End: 2019-06-25
Attending: EMERGENCY MEDICINE
Payer: MEDICARE

## 2019-06-25 VITALS
BODY MASS INDEX: 37.89 KG/M2 | WEIGHT: 250 LBS | RESPIRATION RATE: 20 BRPM | OXYGEN SATURATION: 96 % | DIASTOLIC BLOOD PRESSURE: 68 MMHG | HEIGHT: 68 IN | HEART RATE: 60 BPM | SYSTOLIC BLOOD PRESSURE: 151 MMHG | TEMPERATURE: 98 F

## 2019-06-25 VITALS
RESPIRATION RATE: 16 BRPM | OXYGEN SATURATION: 95 % | SYSTOLIC BLOOD PRESSURE: 109 MMHG | WEIGHT: 249.13 LBS | DIASTOLIC BLOOD PRESSURE: 69 MMHG | TEMPERATURE: 98 F | HEIGHT: 68 IN | BODY MASS INDEX: 37.76 KG/M2 | HEART RATE: 57 BPM

## 2019-06-25 DIAGNOSIS — M79.89 RIGHT LEG SWELLING: ICD-10-CM

## 2019-06-25 DIAGNOSIS — M25.461 KNEE EFFUSION, RIGHT: ICD-10-CM

## 2019-06-25 DIAGNOSIS — M25.561 ACUTE PAIN OF RIGHT KNEE: Primary | ICD-10-CM

## 2019-06-25 DIAGNOSIS — R20.0 NUMBNESS OF RIGHT HAND: ICD-10-CM

## 2019-06-25 DIAGNOSIS — R53.1 ACUTE RIGHT-SIDED WEAKNESS: Primary | ICD-10-CM

## 2019-06-25 PROCEDURE — 99285 EMERGENCY DEPT VISIT HI MDM: CPT

## 2019-06-25 PROCEDURE — 81003 URINALYSIS AUTO W/O SCOPE: CPT | Performed by: EMERGENCY MEDICINE

## 2019-06-25 PROCEDURE — 85025 COMPLETE CBC W/AUTO DIFF WBC: CPT | Performed by: EMERGENCY MEDICINE

## 2019-06-25 PROCEDURE — 93010 ELECTROCARDIOGRAM REPORT: CPT | Performed by: EMERGENCY MEDICINE

## 2019-06-25 PROCEDURE — 70551 MRI BRAIN STEM W/O DYE: CPT | Performed by: EMERGENCY MEDICINE

## 2019-06-25 PROCEDURE — 99213 OFFICE O/P EST LOW 20 MIN: CPT

## 2019-06-25 PROCEDURE — 36415 COLL VENOUS BLD VENIPUNCTURE: CPT

## 2019-06-25 PROCEDURE — 93005 ELECTROCARDIOGRAM TRACING: CPT

## 2019-06-25 PROCEDURE — 80048 BASIC METABOLIC PNL TOTAL CA: CPT | Performed by: EMERGENCY MEDICINE

## 2019-06-25 PROCEDURE — 99212 OFFICE O/P EST SF 10 MIN: CPT

## 2019-06-25 PROCEDURE — 73562 X-RAY EXAM OF KNEE 3: CPT | Performed by: EMERGENCY MEDICINE

## 2019-06-25 RX ORDER — 0.9 % SODIUM CHLORIDE 0.9 %
250 INTRAVENOUS SOLUTION INTRAVENOUS ONCE
Status: COMPLETED | OUTPATIENT
Start: 2019-06-25 | End: 2019-06-25

## 2019-06-25 NOTE — ED INITIAL ASSESSMENT (HPI)
Woke up this am with right wrist and hand numbness/tingling. Also states her right knee \"was giving out. \" today.

## 2019-06-25 NOTE — ED NOTES
Agrees to go to ED for further evaluation. Patient drove herself here. 911 called for transport. IV inserted. No change in status. No headache or dizziness.

## 2019-06-25 NOTE — ED PROVIDER NOTES
Patient Seen in: [address] [facility]    History   Patient presents with:  Hand Pain  Knee Pain    Stated Complaint: knee    HPI    Patient is a 43-year-old female with a history of breast cancer, DVT, polymyalgia rheumatica, and r problem.     Social History    Tobacco Use      Smoking status: Former Smoker        Packs/day: 3.00        Years: 25.00        Pack years: 76        Types: Cigarettes        Quit date: 9/3/1980        Years since quittin.8      Smokeless tobacco: Yobani Luisa (primary encounter diagnosis)  Right leg swelling    Disposition: Ic to ed  6/25/2019  9:53 am    Follow-up:  No follow-up provider specified.       Medications Prescribed:  Current Discharge Medication List

## 2019-06-25 NOTE — ED PROVIDER NOTES
Patient Seen in: [facility] Emergency Department    History   Patient presents with:  Fatigue (constitutional, neurologic)    Stated Complaint: weakness    HPI    Patient is here with sensation last evening at 8 PM she felt a pain in her right kne INTRAOCULAR LENS IMPLANT 86343 Right 7/27/2016    Performed by Joseph Shafer MD at [address] [place] [address] History    Tobacco Use      Smoking status: Former Smoker        Packs/day: 3.00        Years: 25.00        Pack years: 76        Typ °C) (Temporal)   Resp 16   Ht 172.7 cm (5' 8\")   Wt 113 kg   SpO2 95%   BMI 37.88 kg/m²         Physical Exam  Constitutional:  Alert, well nourished adult lying in bed in no distress. Vital signs noted. Eye:  No scleral icterus.   Eyelids appear normal, METABOLIC PANEL (8) - Abnormal; Notable for the following components:       Result Value    BUN 23 (*)     BUN/CREA Ratio 26.7 (*)     Calculated Osmolality 297 (*)     All other components within normal limits   CBC W/ DIFFERENTIAL - Abnormal; Notable for

## 2019-10-22 PROBLEM — G56.03 BILATERAL CARPAL TUNNEL SYNDROME: Status: ACTIVE | Noted: 2019-10-22

## 2020-02-18 PROBLEM — Z17.1 MALIGNANT NEOPLASM OF LOWER-INNER QUADRANT OF LEFT BREAST IN FEMALE, ESTROGEN RECEPTOR NEGATIVE (HCC): Status: ACTIVE | Noted: 2020-02-18

## 2020-02-18 PROBLEM — C50.312 MALIGNANT NEOPLASM OF LOWER-INNER QUADRANT OF LEFT BREAST IN FEMALE, ESTROGEN RECEPTOR NEGATIVE (HCC): Status: ACTIVE | Noted: 2020-02-18

## 2020-02-18 PROBLEM — Z17.1 MALIGNANT NEOPLASM OF LOWER-INNER QUADRANT OF LEFT BREAST IN FEMALE, ESTROGEN RECEPTOR NEGATIVE: Status: ACTIVE | Noted: 2020-02-18

## 2020-02-18 PROBLEM — C50.312 MALIGNANT NEOPLASM OF LOWER-INNER QUADRANT OF LEFT BREAST IN FEMALE, ESTROGEN RECEPTOR NEGATIVE: Status: ACTIVE | Noted: 2020-02-18

## 2020-07-20 PROBLEM — D84.9 IMMUNOSUPPRESSION (HCC): Status: ACTIVE | Noted: 2020-07-20

## 2020-10-01 ENCOUNTER — HOSPITAL ENCOUNTER (EMERGENCY)
Facility: HOSPITAL | Age: 80
Discharge: HOME | End: 2020-10-01
Attending: EMERGENCY MEDICINE
Payer: MEDICARE

## 2020-10-01 ENCOUNTER — APPOINTMENT (OUTPATIENT)
Dept: ULTRASOUND IMAGING | Facility: HOSPITAL | Age: 80
End: 2020-10-01
Attending: EMERGENCY MEDICINE
Payer: MEDICARE

## 2020-10-01 VITALS
HEIGHT: 68 IN | HEART RATE: 55 BPM | RESPIRATION RATE: 17 BRPM | WEIGHT: 250 LBS | SYSTOLIC BLOOD PRESSURE: 111 MMHG | DIASTOLIC BLOOD PRESSURE: 61 MMHG | OXYGEN SATURATION: 97 % | BODY MASS INDEX: 37.89 KG/M2 | TEMPERATURE: 98 F

## 2020-10-01 DIAGNOSIS — M79.662 PAIN OF LEFT CALF: ICD-10-CM

## 2020-10-01 DIAGNOSIS — I82.432 ACUTE DEEP VEIN THROMBOSIS (DVT) OF POPLITEAL VEIN OF LEFT LOWER EXTREMITY (HCC): Primary | ICD-10-CM

## 2020-10-01 PROCEDURE — 99284 EMERGENCY DEPT VISIT MOD MDM: CPT

## 2020-10-01 PROCEDURE — 80048 BASIC METABOLIC PNL TOTAL CA: CPT | Performed by: EMERGENCY MEDICINE

## 2020-10-01 PROCEDURE — 93971 EXTREMITY STUDY: CPT | Performed by: EMERGENCY MEDICINE

## 2020-10-01 PROCEDURE — 36415 COLL VENOUS BLD VENIPUNCTURE: CPT

## 2020-10-01 PROCEDURE — 85025 COMPLETE CBC W/AUTO DIFF WBC: CPT | Performed by: EMERGENCY MEDICINE

## 2020-10-01 NOTE — ED PROVIDER NOTES
Patient Seen in: [facility] AND [facility] Emergency Department      History   Patient presents with:  Deep Vein Thrombosis    Stated Complaint: Left leg pain    HPI  64-year-old female with rheumatoid arthritis, obstructive sleep apnea, systolic heart failure, effort is normal.      Breath sounds: Normal breath sounds. Abdominal:      General: There is no distension. Palpations: Abdomen is soft. Tenderness: There is no abdominal tenderness. Musculoskeletal: Normal range of motion.    Skin:     Gener

## 2020-10-01 NOTE — CM/SW NOTE
Called by Dr. Colton Spain to provide patient with Eliquis savings card - free 30 days trial offer provided to patient, along with coverage information assistance booklet on Eliquis. Pt v/u.

## 2020-10-01 NOTE — ED PROVIDER NOTES
Signed out by previous shift to follow-up results of ultrasound. Patient does have an acute DVT at this time. She is comfortable discharged on Eliquis and has no other questions currently.     Us Venous Doppler Leg Left - Diag Img (cpt=93971)    Result Da

## 2020-10-01 NOTE — ED INITIAL ASSESSMENT (HPI)
Patient complains of left lower leg pain since last night. Denies trauma. No swelling.   Patient reports Hx of dvt pt having nosebleeds over the past 2 days and is also concerned about the taste of metal in his mouth

## 2020-10-14 PROBLEM — I82.4Y2 ACUTE DEEP VEIN THROMBOSIS (DVT) OF PROXIMAL VEIN OF LEFT LOWER EXTREMITY (HCC): Status: ACTIVE | Noted: 2020-10-14

## 2020-10-14 PROBLEM — D84.9 IMMUNOSUPPRESSION (HCC): Status: RESOLVED | Noted: 2020-07-20 | Resolved: 2020-10-14

## 2021-02-20 PROBLEM — I50.22 CHRONIC SYSTOLIC HEART FAILURE (HCC): Status: ACTIVE | Noted: 2018-08-02

## 2021-02-20 PROBLEM — I25.2 HISTORY OF ST ELEVATION MYOCARDIAL INFARCTION (STEMI): Status: ACTIVE | Noted: 2018-07-28

## 2021-02-21 PROBLEM — Z86.718 HISTORY OF RECURRENT DEEP VEIN THROMBOSIS (DVT): Status: ACTIVE | Noted: 2020-10-14

## 2021-02-22 PROBLEM — Z17.1 MALIGNANT NEOPLASM OF LOWER-INNER QUADRANT OF LEFT BREAST IN FEMALE, ESTROGEN RECEPTOR NEGATIVE (HCC): Status: RESOLVED | Noted: 2020-02-18 | Resolved: 2021-02-22

## 2021-02-22 PROBLEM — C50.312 MALIGNANT NEOPLASM OF LOWER-INNER QUADRANT OF LEFT BREAST IN FEMALE, ESTROGEN RECEPTOR NEGATIVE: Status: RESOLVED | Noted: 2020-02-18 | Resolved: 2021-02-22

## 2021-02-22 PROBLEM — I82.4Y2 ACUTE DEEP VEIN THROMBOSIS (DVT) OF PROXIMAL VEIN OF LEFT LOWER EXTREMITY (HCC): Status: ACTIVE | Noted: 2021-02-22

## 2021-02-22 PROBLEM — N39.46 MIXED STRESS AND URGE URINARY INCONTINENCE: Status: ACTIVE | Noted: 2021-02-22

## 2021-02-22 PROBLEM — Z17.1 MALIGNANT NEOPLASM OF LOWER-INNER QUADRANT OF LEFT BREAST IN FEMALE, ESTROGEN RECEPTOR NEGATIVE: Status: RESOLVED | Noted: 2020-02-18 | Resolved: 2021-02-22

## 2021-02-22 PROBLEM — C50.312 MALIGNANT NEOPLASM OF LOWER-INNER QUADRANT OF LEFT BREAST IN FEMALE, ESTROGEN RECEPTOR NEGATIVE (HCC): Status: RESOLVED | Noted: 2020-02-18 | Resolved: 2021-02-22

## 2021-03-05 PROBLEM — Z86.718 HISTORY OF RECURRENT DEEP VEIN THROMBOSIS (DVT): Status: RESOLVED | Noted: 2020-10-14 | Resolved: 2021-03-05

## 2021-03-05 PROBLEM — I50.22 CHRONIC SYSTOLIC HEART FAILURE (HCC): Status: RESOLVED | Noted: 2018-08-02 | Resolved: 2021-03-05

## 2022-03-02 PROBLEM — I42.8 OTHER CARDIOMYOPATHIES (HCC): Status: ACTIVE | Noted: 2018-08-02

## 2022-03-02 PROBLEM — S93.492A SPRAIN OF POSTERIOR TALOFIBULAR LIGAMENT OF LEFT ANKLE, INITIAL ENCOUNTER: Status: RESOLVED | Noted: 2018-02-27 | Resolved: 2022-03-02

## 2022-10-18 ENCOUNTER — HOSPITAL ENCOUNTER (EMERGENCY)
Facility: HOSPITAL | Age: 82
Discharge: HOME | End: 2022-10-18
Attending: EMERGENCY MEDICINE
Payer: MEDICARE

## 2022-10-18 ENCOUNTER — APPOINTMENT (OUTPATIENT)
Dept: ULTRASOUND IMAGING | Facility: HOSPITAL | Age: 82
End: 2022-10-18
Attending: EMERGENCY MEDICINE
Payer: MEDICARE

## 2022-10-18 VITALS
DIASTOLIC BLOOD PRESSURE: 75 MMHG | HEIGHT: 69 IN | OXYGEN SATURATION: 98 % | BODY MASS INDEX: 37.03 KG/M2 | SYSTOLIC BLOOD PRESSURE: 141 MMHG | RESPIRATION RATE: 22 BRPM | WEIGHT: 250 LBS | TEMPERATURE: 97 F | HEART RATE: 67 BPM

## 2022-10-18 DIAGNOSIS — M54.32 SCIATICA OF LEFT SIDE: Primary | ICD-10-CM

## 2022-10-18 PROCEDURE — 93971 EXTREMITY STUDY: CPT | Performed by: EMERGENCY MEDICINE

## 2022-10-18 PROCEDURE — 99284 EMERGENCY DEPT VISIT MOD MDM: CPT

## 2022-10-18 NOTE — ED INITIAL ASSESSMENT (HPI)
Pt came in for pain to her left upper leg last night while laying down from knee to hip. Hx of multiple blood clots on eliquis. RR even and nonlabored, speaking in full sentences, ambulatory with slow gait and walker. Denies trauma, falls.

## 2023-04-29 ENCOUNTER — APPOINTMENT (OUTPATIENT)
Dept: GENERAL RADIOLOGY | Facility: HOSPITAL | Age: 83
End: 2023-04-29
Attending: EMERGENCY MEDICINE
Payer: MEDICARE

## 2023-04-29 ENCOUNTER — HOSPITAL ENCOUNTER (EMERGENCY)
Facility: HOSPITAL | Age: 83
Discharge: HOME | End: 2023-04-29
Attending: EMERGENCY MEDICINE
Payer: MEDICARE

## 2023-04-29 VITALS
DIASTOLIC BLOOD PRESSURE: 75 MMHG | TEMPERATURE: 97 F | RESPIRATION RATE: 16 BRPM | HEART RATE: 46 BPM | HEIGHT: 68 IN | SYSTOLIC BLOOD PRESSURE: 106 MMHG | WEIGHT: 245 LBS | OXYGEN SATURATION: 94 % | BODY MASS INDEX: 37.13 KG/M2

## 2023-04-29 DIAGNOSIS — M25.551 RIGHT HIP PAIN: Primary | ICD-10-CM

## 2023-04-29 LAB
ANION GAP SERPL CALC-SCNC: 9 MMOL/L (ref 0–18)
BUN BLD-MCNC: 22 MG/DL (ref 7–18)
BUN/CREAT SERPL: 28.2 (ref 10–20)
CALCIUM BLD-MCNC: 9.5 MG/DL (ref 8.5–10.1)
CHLORIDE SERPL-SCNC: 108 MMOL/L (ref 98–112)
CK SERPL-CCNC: 55 U/L
CO2 SERPL-SCNC: 25 MMOL/L (ref 21–32)
CREAT BLD-MCNC: 0.78 MG/DL
GFR SERPLBLD BASED ON 1.73 SQ M-ARVRAT: 76 ML/MIN/1.73M2 (ref 60–?)
GLUCOSE BLD-MCNC: 101 MG/DL (ref 70–99)
MAGNESIUM SERPL-MCNC: 2.5 MG/DL (ref 1.6–2.6)
OSMOLALITY SERPL CALC.SUM OF ELEC: 297 MOSM/KG (ref 275–295)
POTASSIUM SERPL-SCNC: 4.6 MMOL/L (ref 3.5–5.1)
SODIUM SERPL-SCNC: 142 MMOL/L (ref 136–145)

## 2023-04-29 PROCEDURE — 82550 ASSAY OF CK (CPK): CPT | Performed by: EMERGENCY MEDICINE

## 2023-04-29 PROCEDURE — 73502 X-RAY EXAM HIP UNI 2-3 VIEWS: CPT | Performed by: EMERGENCY MEDICINE

## 2023-04-29 PROCEDURE — 96375 TX/PRO/DX INJ NEW DRUG ADDON: CPT

## 2023-04-29 PROCEDURE — 83735 ASSAY OF MAGNESIUM: CPT | Performed by: EMERGENCY MEDICINE

## 2023-04-29 PROCEDURE — 99285 EMERGENCY DEPT VISIT HI MDM: CPT

## 2023-04-29 PROCEDURE — 96374 THER/PROPH/DIAG INJ IV PUSH: CPT

## 2023-04-29 PROCEDURE — 93005 ELECTROCARDIOGRAM TRACING: CPT

## 2023-04-29 PROCEDURE — 93010 ELECTROCARDIOGRAM REPORT: CPT

## 2023-04-29 PROCEDURE — 80048 BASIC METABOLIC PNL TOTAL CA: CPT | Performed by: EMERGENCY MEDICINE

## 2023-04-29 RX ORDER — BUPIVACAINE HYDROCHLORIDE 2.5 MG/ML
20 INJECTION, SOLUTION EPIDURAL; INFILTRATION; INTRACAUDAL ONCE
Status: DISCONTINUED | OUTPATIENT
Start: 2023-04-29 | End: 2023-04-29

## 2023-04-29 RX ORDER — HYDROCODONE BITARTRATE AND ACETAMINOPHEN 5; 325 MG/1; MG/1
1 TABLET ORAL EVERY 8 HOURS PRN
Qty: 9 TABLET | Refills: 0 | Status: SHIPPED | OUTPATIENT
Start: 2023-04-29 | End: 2023-05-02

## 2023-04-29 RX ORDER — MORPHINE SULFATE 4 MG/ML
4 INJECTION, SOLUTION INTRAMUSCULAR; INTRAVENOUS ONCE
Status: COMPLETED | OUTPATIENT
Start: 2023-04-29 | End: 2023-04-29

## 2023-04-29 NOTE — ED INITIAL ASSESSMENT (HPI)
[address] [facility] from home. Patient states she was cleaning yesterday when she felt a sudden pain in her right hip radiating to her right knee . Denies any known injury. Unable to ambulate today. Patient screamed when moving her to the cart. Patient states \" I took every kind of medication at home possible without relief. \"

## 2023-04-30 LAB
ATRIAL RATE: 41 BPM
P AXIS: 75 DEGREES
P-R INTERVAL: 216 MS
Q-T INTERVAL: 492 MS
QRS DURATION: 90 MS
QTC CALCULATION (BEZET): 405 MS
R AXIS: -24 DEGREES
T AXIS: 14 DEGREES
VENTRICULAR RATE: 41 BPM

## 2023-09-16 NOTE — ED INITIAL ASSESSMENT (HPI)
C/o pain to right knee with weakness starting last night. \"Keeps wanting to give out on me\". This morning has pain and swelling to right hand with numbness and tingling. No headache. No dizziness. Feels unstable when walking. Using a cane.  Denies injury
No

## 2024-02-23 RX ORDER — GABAPENTIN 400 MG/1
400 CAPSULE ORAL NIGHTLY
COMMUNITY

## 2024-02-23 RX ORDER — FUROSEMIDE 20 MG/1
20 TABLET ORAL DAILY
COMMUNITY

## 2024-02-23 RX ORDER — GABAPENTIN 100 MG/1
100 CAPSULE ORAL EVERY MORNING
COMMUNITY

## 2024-02-23 NOTE — DISCHARGE INSTRUCTIONS
HOME INSTRUCTIONS  What happens after hysteroscopy?  You may have cramps and bleeding for short time after the procedure. This is normal. Use pads instead of tampons.  Don't douche or use tampons until your healthcare provider says it’s OK.  Don't use any vaginal medicines until you are told it’s OK.  Ask your healthcare provider when it’s OK to have sex again.   prescription, tramadol for pain from you're pharmacy.  May take tylenol instead for mild pain  May use heat pack for any cramping    When to call your healthcare provider  Call your healthcare provider if any of the following occur:  Heavy bleeding (more than 1 pad an hour for 2 or more hours)  A fever of 100.4°F ( 38.0°C) or higher, or as directed by your provider  Increasing belly (abdominal) pain or soreness  Bad-smelling discharge  Follow-up care  Schedule a follow-up visit with your healthcare provider. Based on your test results, you may need more treatment. Be sure to follow directions and keep your appointments.  Call to make an appointment for 2 weeks      [facility] HOME CARE INSTRUCTIONS: POST-OP ANESTHESIA  The medication that you received for sedation or general anesthesia can last up to 24 hours. Your judgment and reflexes may be altered, even if you feel like your normal self.      We Recommend:   Do not drive any motor vehicle or bicycle   Avoid mowing the lawn, playing sports, or working with power tools/applicances (power saws, electric knives or mixers)   That you have someone stay with you on your first night home   Do not drink alcohol or take sleeping pills or tranquilizers   Do not sign legal documents within 24 hours of your procedure   If you had a nerve block for your surgery, take extra care not to put any pressure on your arm or hand for 24 hours    It is normal:  For you to have a sore throat if you had a breathing tube during surgery (while you were asleep!). The sore throat should get better within 48 hours. You can  gargle with warm salt water (1/2 tsp in 4 oz warm water) or use a throat lozenge for comfort  To feel muscle aches or soreness especially in the abdomen, chest or neck. The achy feeling should go away in the next 24 hours  To feel weak, sleepy or \"wiped out\". Your should start feeling better in the next 24 hours.   To experience mild discomforts such as sore lip or tongue, headache, cramps, gas pains or a bloated feeling in your abdomen.   To experience mild back pain or soreness for a day or two if you had spinal or epidural anesthesia.   If you had laparoscopic surgery, to feel shoulder pain or discomfort on the day of surgery.   For some patients to have nausea after surgery/anesthesia    If you feel nausea or experience vomiting:   Try to move around less.   Eat less than usual or drink only liquids until the next morning   Nausea should resolve in about 24 hours    If you have a problem when you are at home:    Call your surgeons office   Discharge Instructions: After Your Surgery  You’ve just had surgery. During surgery, you were given medicine called anesthesia to keep you relaxed and free of pain. After surgery, you may have some pain or nausea. This is common. Here are some tips for feeling better and getting well after surgery.   Going home  Your healthcare provider will show you how to take care of yourself when you go home. They'll also answer your questions. Have an adult family member or friend drive you home. For the first 24 hours after your surgery:   Don't drive or use heavy equipment.  Don't make important decisions or sign legal papers.  Take medicines as directed.  Don't drink alcohol.  Have someone stay with you, if needed. They can watch for problems and help keep you safe.  Be sure to go to all follow-up visits with your healthcare provider. And rest after your surgery for as long as your provider tells you to.   Coping with pain  If you have pain after surgery, pain medicine will help you  feel better. Take it as directed, before pain becomes severe. Also, ask your healthcare provider or pharmacist about other ways to control pain. This might be with heat, ice, or relaxation. And follow any other instructions your surgeon or nurse gives you.      Stay on schedule with your medicine.     Tips for taking pain medicine  To get the best relief possible, remember these points:   Pain medicines can upset your stomach. Taking them with a little food may help.  Most pain relievers taken by mouth need at least 20 to 30 minutes to start to work.  Don't wait till your pain becomes severe before you take your medicine. Try to time your medicine so that you can take it before starting an activity. This might be before you get dressed, go for a walk, or sit down for dinner.  Constipation is a common side effect of some pain medicines. Call your healthcare provider before taking any medicines such as laxatives or stool softeners to help ease constipation. Also ask if you should skip any foods. Drinking lots of fluids and eating foods such as fruits and vegetables that are high in fiber can also help. Remember, don't take laxatives unless your surgeon has prescribed them.  Drinking alcohol and taking pain medicine can cause dizziness and slow your breathing. It can even be deadly. Don't drink alcohol while taking pain medicine.  Pain medicine can make you react more slowly to things. Don't drive or run machinery while taking pain medicine.  Your healthcare provider may tell you to take acetaminophen to help ease your pain. Ask them how much you're supposed to take each day. Acetaminophen or other pain relievers may interact with your prescription medicines or other over-the-counter (OTC) medicines. Some prescription medicines have acetaminophen and other ingredients in them. Using both prescription and OTC acetaminophen for pain can cause you to accidentally overdose. Read the labels on your OTC medicines with care.  This will help you to clearly know the list of ingredients, how much to take, and any warnings. It may also help you not take too much acetaminophen. If you have questions or don't understand the information, ask your pharmacist or healthcare provider to explain it to you before you take the OTC medicine.   Managing nausea  Some people have an upset stomach (nausea) after surgery. This is often because of anesthesia, pain, or pain medicine, less movement of food in the stomach, or the stress of surgery. These tips will help you handle nausea and eat healthy foods as you get better. If you were on a special food plan before surgery, ask your healthcare provider if you should follow it while you get better. Check with your provider on how your eating should progress. It may depend on the surgery you had. These general tips may help:   Don't push yourself to eat. Your body will tell you when to eat and how much.  Start off with clear liquids and soup. They're easier to digest.  Next try semi-solid foods as you feel ready. These include mashed potatoes, applesauce, and gelatin.  Slowly move to solid foods. Don’t eat fatty, rich, or spicy foods at first.  Don't force yourself to have 3 large meals a day. Instead eat smaller amounts more often.  Take pain medicines with a small amount of solid food, such as crackers or toast. This helps prevent nausea.  When to call your healthcare provider  Call your healthcare provider right away if you have any of these:   You still have too much pain, or the pain gets worse, after taking the medicine. The medicine may not be strong enough. Or there may be a complication from the surgery.  You feel too sleepy, dizzy, or groggy. The medicine may be too strong.  Side effects such as nausea or vomiting. Your healthcare provider may advise taking other medicines to .  Skin changes such as rash, itching, or hives. This may mean you have an allergic reaction. Your provider may advise taking  other medicines.  The incision looks different (for instance, part of it opens up).  Bleeding or fluid leaking from the incision site, and weren't told to expect that.  Fever of 100.4°F (38°C) or higher, or as directed by your provider.  Call 911  Call 911 right away if you have:   Trouble breathing  Facial swelling    If you have obstructive sleep apnea   You were given anesthesia medicine during surgery to keep you comfortable and free of pain. After surgery, you may have more apnea spells because of this medicine and other medicines you were given. The spells may last longer than normal.    At home:  Keep using the continuous positive airway pressure (CPAP) device when you sleep. Unless your healthcare provider tells you not to, use it when you sleep, day or night. CPAP is a common device used to treat obstructive sleep apnea.  Talk with your provider before taking any pain medicine, muscle relaxants, or sedatives. Your provider will tell you about the possible dangers of taking these medicines.  Contact your provider if your sleeping changes a lot even when taking medicines as directed.  Buddy last reviewed this educational content on 10/1/2021  © 2410-1342 [employer]. All rights reserved. This information is not intended as a substitute for professional medical care. Always follow your healthcare professional's instructions.

## 2024-02-28 ENCOUNTER — HOSPITAL ENCOUNTER (OUTPATIENT)
Facility: HOSPITAL | Age: 84
Setting detail: HOSPITAL OUTPATIENT SURGERY
Discharge: HOME | End: 2024-02-28
Attending: OBSTETRICS & GYNECOLOGY | Admitting: OBSTETRICS & GYNECOLOGY
Payer: MEDICARE

## 2024-02-28 ENCOUNTER — ANESTHESIA (OUTPATIENT)
Dept: SURGERY | Facility: HOSPITAL | Age: 84
End: 2024-02-28
Payer: MEDICARE

## 2024-02-28 ENCOUNTER — ANESTHESIA EVENT (OUTPATIENT)
Dept: SURGERY | Facility: HOSPITAL | Age: 84
End: 2024-02-28
Payer: MEDICARE

## 2024-02-28 VITALS
HEART RATE: 71 BPM | OXYGEN SATURATION: 95 % | SYSTOLIC BLOOD PRESSURE: 121 MMHG | WEIGHT: 250 LBS | BODY MASS INDEX: 37.89 KG/M2 | TEMPERATURE: 98 F | RESPIRATION RATE: 16 BRPM | HEIGHT: 68 IN | DIASTOLIC BLOOD PRESSURE: 69 MMHG

## 2024-02-28 DIAGNOSIS — Z98.890 POST-OPERATIVE STATE: Primary | ICD-10-CM

## 2024-02-28 PROCEDURE — 88305 TISSUE EXAM BY PATHOLOGIST: CPT | Performed by: OBSTETRICS & GYNECOLOGY

## 2024-02-28 PROCEDURE — 0UB98ZZ EXCISION OF UTERUS, VIA NATURAL OR ARTIFICIAL OPENING ENDOSCOPIC: ICD-10-PCS | Performed by: OBSTETRICS & GYNECOLOGY

## 2024-02-28 PROCEDURE — 0UDB7ZZ EXTRACTION OF ENDOMETRIUM, VIA NATURAL OR ARTIFICIAL OPENING: ICD-10-PCS | Performed by: OBSTETRICS & GYNECOLOGY

## 2024-02-28 RX ORDER — FAMOTIDINE 10 MG/ML
20 INJECTION, SOLUTION INTRAVENOUS ONCE
Status: COMPLETED | OUTPATIENT
Start: 2024-02-28 | End: 2024-02-28

## 2024-02-28 RX ORDER — ACETAMINOPHEN 325 MG/1
650 TABLET ORAL EVERY 6 HOURS PRN
Status: DISCONTINUED | OUTPATIENT
Start: 2024-02-28 | End: 2024-02-28

## 2024-02-28 RX ORDER — SODIUM CHLORIDE, SODIUM LACTATE, POTASSIUM CHLORIDE, CALCIUM CHLORIDE 600; 310; 30; 20 MG/100ML; MG/100ML; MG/100ML; MG/100ML
INJECTION, SOLUTION INTRAVENOUS CONTINUOUS
Status: CANCELLED | OUTPATIENT
Start: 2024-02-28

## 2024-02-28 RX ORDER — ONDANSETRON 2 MG/ML
INJECTION INTRAMUSCULAR; INTRAVENOUS AS NEEDED
Status: DISCONTINUED | OUTPATIENT
Start: 2024-02-28 | End: 2024-02-28 | Stop reason: SURG

## 2024-02-28 RX ORDER — BUPIVACAINE HYDROCHLORIDE 2.5 MG/ML
INJECTION, SOLUTION EPIDURAL; INFILTRATION; INTRACAUDAL AS NEEDED
Status: DISCONTINUED | OUTPATIENT
Start: 2024-02-28 | End: 2024-02-28 | Stop reason: HOSPADM

## 2024-02-28 RX ORDER — DEXAMETHASONE SODIUM PHOSPHATE 4 MG/ML
VIAL (ML) INJECTION AS NEEDED
Status: DISCONTINUED | OUTPATIENT
Start: 2024-02-28 | End: 2024-02-28 | Stop reason: SURG

## 2024-02-28 RX ORDER — METOCLOPRAMIDE HYDROCHLORIDE 5 MG/ML
10 INJECTION INTRAMUSCULAR; INTRAVENOUS ONCE
Status: COMPLETED | OUTPATIENT
Start: 2024-02-28 | End: 2024-02-28

## 2024-02-28 RX ORDER — HYDROMORPHONE HYDROCHLORIDE 1 MG/ML
0.2 INJECTION, SOLUTION INTRAMUSCULAR; INTRAVENOUS; SUBCUTANEOUS EVERY 5 MIN PRN
Status: DISCONTINUED | OUTPATIENT
Start: 2024-02-28 | End: 2024-02-28

## 2024-02-28 RX ORDER — EPHEDRINE SULFATE 50 MG/ML
INJECTION INTRAVENOUS AS NEEDED
Status: DISCONTINUED | OUTPATIENT
Start: 2024-02-28 | End: 2024-02-28 | Stop reason: SURG

## 2024-02-28 RX ORDER — NICOTINE POLACRILEX 4 MG
30 LOZENGE BUCCAL
Status: DISCONTINUED | OUTPATIENT
Start: 2024-02-28 | End: 2024-02-28

## 2024-02-28 RX ORDER — METOPROLOL TARTRATE 1 MG/ML
2.5 INJECTION, SOLUTION INTRAVENOUS ONCE
Status: DISCONTINUED | OUTPATIENT
Start: 2024-02-28 | End: 2024-02-28

## 2024-02-28 RX ORDER — DIPHENHYDRAMINE HYDROCHLORIDE 50 MG/ML
12.5 INJECTION INTRAMUSCULAR; INTRAVENOUS EVERY 4 HOURS PRN
Status: DISCONTINUED | OUTPATIENT
Start: 2024-02-28 | End: 2024-02-28

## 2024-02-28 RX ORDER — HYDROMORPHONE HYDROCHLORIDE 1 MG/ML
0.6 INJECTION, SOLUTION INTRAMUSCULAR; INTRAVENOUS; SUBCUTANEOUS EVERY 5 MIN PRN
Status: DISCONTINUED | OUTPATIENT
Start: 2024-02-28 | End: 2024-02-28

## 2024-02-28 RX ORDER — GLYCOPYRROLATE 0.2 MG/ML
INJECTION, SOLUTION INTRAMUSCULAR; INTRAVENOUS AS NEEDED
Status: DISCONTINUED | OUTPATIENT
Start: 2024-02-28 | End: 2024-02-28 | Stop reason: SURG

## 2024-02-28 RX ORDER — METOCLOPRAMIDE 10 MG/1
10 TABLET ORAL ONCE
Status: COMPLETED | OUTPATIENT
Start: 2024-02-28 | End: 2024-02-28

## 2024-02-28 RX ORDER — LIDOCAINE HYDROCHLORIDE 10 MG/ML
INJECTION, SOLUTION EPIDURAL; INFILTRATION; INTRACAUDAL; PERINEURAL AS NEEDED
Status: DISCONTINUED | OUTPATIENT
Start: 2024-02-28 | End: 2024-02-28 | Stop reason: SURG

## 2024-02-28 RX ORDER — HYDROMORPHONE HYDROCHLORIDE 1 MG/ML
0.4 INJECTION, SOLUTION INTRAMUSCULAR; INTRAVENOUS; SUBCUTANEOUS EVERY 5 MIN PRN
Status: DISCONTINUED | OUTPATIENT
Start: 2024-02-28 | End: 2024-02-28

## 2024-02-28 RX ORDER — ONDANSETRON 4 MG/1
4 TABLET, FILM COATED ORAL EVERY 8 HOURS PRN
Status: DISCONTINUED | OUTPATIENT
Start: 2024-02-28 | End: 2024-02-28

## 2024-02-28 RX ORDER — DEXTROSE MONOHYDRATE 25 G/50ML
50 INJECTION, SOLUTION INTRAVENOUS
Status: DISCONTINUED | OUTPATIENT
Start: 2024-02-28 | End: 2024-02-28

## 2024-02-28 RX ORDER — MORPHINE SULFATE 4 MG/ML
2 INJECTION, SOLUTION INTRAMUSCULAR; INTRAVENOUS EVERY 10 MIN PRN
Status: DISCONTINUED | OUTPATIENT
Start: 2024-02-28 | End: 2024-02-28

## 2024-02-28 RX ORDER — SODIUM CHLORIDE, SODIUM LACTATE, POTASSIUM CHLORIDE, CALCIUM CHLORIDE 600; 310; 30; 20 MG/100ML; MG/100ML; MG/100ML; MG/100ML
INJECTION, SOLUTION INTRAVENOUS CONTINUOUS
Status: DISCONTINUED | OUTPATIENT
Start: 2024-02-28 | End: 2024-02-28

## 2024-02-28 RX ORDER — NALOXONE HYDROCHLORIDE 0.4 MG/ML
80 INJECTION, SOLUTION INTRAMUSCULAR; INTRAVENOUS; SUBCUTANEOUS AS NEEDED
Status: DISCONTINUED | OUTPATIENT
Start: 2024-02-28 | End: 2024-02-28

## 2024-02-28 RX ORDER — TRAMADOL HYDROCHLORIDE 50 MG/1
TABLET ORAL EVERY 6 HOURS PRN
Qty: 6 TABLET | Refills: 0 | Status: SHIPPED | OUTPATIENT
Start: 2024-02-28

## 2024-02-28 RX ORDER — NICOTINE POLACRILEX 4 MG
15 LOZENGE BUCCAL
Status: DISCONTINUED | OUTPATIENT
Start: 2024-02-28 | End: 2024-02-28

## 2024-02-28 RX ORDER — HYDROCODONE BITARTRATE AND ACETAMINOPHEN 5; 325 MG/1; MG/1
2 TABLET ORAL EVERY 6 HOURS PRN
Status: DISCONTINUED | OUTPATIENT
Start: 2024-02-28 | End: 2024-02-28

## 2024-02-28 RX ORDER — DOXYCYCLINE HYCLATE 100 MG/1
100 CAPSULE ORAL ONCE
Status: COMPLETED | OUTPATIENT
Start: 2024-02-28 | End: 2024-02-28

## 2024-02-28 RX ORDER — MORPHINE SULFATE 10 MG/ML
6 INJECTION, SOLUTION INTRAMUSCULAR; INTRAVENOUS EVERY 10 MIN PRN
Status: DISCONTINUED | OUTPATIENT
Start: 2024-02-28 | End: 2024-02-28

## 2024-02-28 RX ORDER — ACETAMINOPHEN 500 MG
1000 TABLET ORAL ONCE
Status: COMPLETED | OUTPATIENT
Start: 2024-02-28 | End: 2024-02-28

## 2024-02-28 RX ORDER — FAMOTIDINE 20 MG/1
20 TABLET, FILM COATED ORAL ONCE
Status: COMPLETED | OUTPATIENT
Start: 2024-02-28 | End: 2024-02-28

## 2024-02-28 RX ORDER — MORPHINE SULFATE 4 MG/ML
4 INJECTION, SOLUTION INTRAMUSCULAR; INTRAVENOUS EVERY 10 MIN PRN
Status: DISCONTINUED | OUTPATIENT
Start: 2024-02-28 | End: 2024-02-28

## 2024-02-28 RX ORDER — ONDANSETRON 2 MG/ML
4 INJECTION INTRAMUSCULAR; INTRAVENOUS EVERY 8 HOURS PRN
Status: DISCONTINUED | OUTPATIENT
Start: 2024-02-28 | End: 2024-02-28

## 2024-02-28 RX ORDER — HYDROCODONE BITARTRATE AND ACETAMINOPHEN 5; 325 MG/1; MG/1
1 TABLET ORAL EVERY 6 HOURS PRN
Status: DISCONTINUED | OUTPATIENT
Start: 2024-02-28 | End: 2024-02-28

## 2024-02-28 RX ORDER — IBUPROFEN 600 MG/1
600 TABLET ORAL EVERY 6 HOURS
Status: DISCONTINUED | OUTPATIENT
Start: 2024-02-28 | End: 2024-02-28

## 2024-02-28 RX ADMIN — SODIUM CHLORIDE, SODIUM LACTATE, POTASSIUM CHLORIDE, CALCIUM CHLORIDE: 600; 310; 30; 20 INJECTION, SOLUTION INTRAVENOUS at 09:20:00

## 2024-02-28 RX ADMIN — DEXAMETHASONE SODIUM PHOSPHATE 4 MG: 4 MG/ML VIAL (ML) INJECTION at 09:35:00

## 2024-02-28 RX ADMIN — ONDANSETRON 4 MG: 2 INJECTION INTRAMUSCULAR; INTRAVENOUS at 09:45:00

## 2024-02-28 RX ADMIN — EPHEDRINE SULFATE 5 MG: 50 INJECTION INTRAVENOUS at 09:37:00

## 2024-02-28 RX ADMIN — GLYCOPYRROLATE 0.1 MG: 0.2 INJECTION, SOLUTION INTRAMUSCULAR; INTRAVENOUS at 09:20:00

## 2024-02-28 RX ADMIN — LIDOCAINE HYDROCHLORIDE 50 MG: 10 INJECTION, SOLUTION EPIDURAL; INFILTRATION; INTRACAUDAL; PERINEURAL at 09:28:00

## 2024-02-28 RX ADMIN — SODIUM CHLORIDE, SODIUM LACTATE, POTASSIUM CHLORIDE, CALCIUM CHLORIDE: 600; 310; 30; 20 INJECTION, SOLUTION INTRAVENOUS at 09:51:00

## 2024-02-28 NOTE — OPERATIVE REPORT
[facility]     Operative Note    Mary Petersen Patient Status:  Hospital Outpatient Surgery    1940 MRN [id number]   Location [facility] OPERATING ROOM Attending Eduardo Hassan MD   Hosp Day # 0 PCP Carole Dalal MD     Pre Op Diagnosis: Thickened endometrium    Post Op Diagnosis:  Endometrial polyps    Procedure:  Procedure(s):  Hysteroscopy with truclear    Surgeon:  Eduardo Hassan M.D.       Anesthesia: MAC    Complications: none     EBL: 5 mL    Specimens: endometrial polyps and curetting     Findings:endometrial polyps    Sponge and Needle Counts:  Verified      Patient was placed under mac sedation. Patient then was prep and draped in a normal sterile fashion in the dorsolithotomy position. The a straight catheter was placed in the urinary bladder 75 cc of urine was obtained.  A pelvic exam was done under anesthesia. A speculum was placed in the vagina.  The cervix was grasp with a tenaculum. A paracervical block was done with 10 mL of 0.25% Marcaine. The cervix was dilated to 7 mm.  A hysteroscopy was placed in the endometrial cavity.  Both ostia were identified.  Using truclear two large polyp were excised without difficulty and  directed biopsy were preformed and sent to pathology. The hysteroscopy was removed and sharp curetting was preformed and sent to pathology.  All equipment was removed.  Patient was taking to PAC in good condition.

## 2024-02-28 NOTE — H&P
Mary Petersen is a 82 year old female.     No LMP recorded. Patient is postmenopausal.  HPI:    Mary Petersen for hysteroscopy D&C The ultrasound showed: The endometrial echo complex measures up to 12.4 mm. Multiple cystic structures are present. Patient does notice an increased discharge over the past year.      Allergies:     Diflucan [Fluconazo*    ANAPHYLAXIS  Simvastatin             MYALGIA  Erythromycin Base       NAUSEA AND VOMITING  Pcn [Penicillins]       RASH   Current Meds:         Current Outpatient Medications   Medication Sig Dispense Refill    gabapentin 100 MG Oral Cap 1 cap po qam and 4 caps po qpm 450 capsule 3    rosuvastatin 10 MG Oral Tab TAKE 1 TABLET EVERY NIGHT 100 tablet 0    metoprolol succinate ER 25 MG Oral Tablet 24 Hr TAKE 1/2 TABLET EVERY DAY (BETA BLOCKER) 45 tablet 0    predniSONE 5 MG Oral Tab Take 1 tablet (5 mg total) by mouth daily. 90 tablet 3    furosemide 20 MG Oral Tab Take 1 tablet (20 mg total) by mouth daily. 90 tablet 0    acetaminophen 500 MG Oral Tab Take 2 tablets (1000 mg total) by mouth every 8 (eight) hours as needed for pain. 60 tablet 0    apixaban (ELIQUIS) 5 MG Oral Tab TAKE 1 TABLET TWICE DAILY 180 tablet 1    methotrexate 2.5 MG Oral Tab Take 8 tablets (20 mg total) by mouth once a week. 96 tablet 1    clotrimazole-betamethasone 1-0.05 % External Cream Apply to affected area bid prn 45 g 3    alendronate 70 MG Oral Tab Take 1 tablet (70 mg total) by mouth once a week. 12 tablet 3    folic acid 1 MG Oral Tab Take 1 tablet (1 mg total) by mouth daily. 90 tablet 3    Coenzyme Q10 (COQ10) 200 MG Oral Cap Take by mouth.        Vitamin B-12 1000 MCG Oral Tab Take 1,000 mcg by mouth.        mupirocin (BACTROBAN) 2 % External Ointment Apply bid x 7 days 22 g 1    EXTRA STRENGTH ACETAMINOPHEN OR Take by mouth.          ergocalciferol 50504 units Oral Cap Take 1 capsule (50,000 Units total) by mouth once a week. 12 capsule 1    Aspirin 81 MG Oral Tab  Take 81 mg by mouth daily.        Calcium Carbonate-Vitamin D 600-125 MG-UNIT Oral Tab Take by mouth 2 (two) times daily.        Cranberry Extract 250 MG Oral Tab Take by mouth 2 (two) times daily.               Past Medical History:   Diagnosis Date    Breast cancer (HCC)       Invasive ductal/lobular carcinoma (Invasive mammary 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)    DVT (deep venous thrombosis) (HCC)      HYPERLIPIDEMIA      OTHER DISEASES       bursitis    PMR (polymyalgia rheumatica) (HCC)      SLEEP APNEA       severe, , O2 ning 85%, AutoPAP 6-20 [facility]            Past Surgical History:   Procedure Laterality Date    APPENDECTOMY   1960    APPENDECTOMY        CHOLECYSTECTOMY        COLONOSCOPY   2006     colon polyps    COLONOSCOPY,BIOPSY   8/29/2011     Performed by VIBHA LEUNG at [facility]    HIP REPLACEMENT SURGERY Right      HX BREAST CANCER        KNEE REPLACEMENT SURGERY Bilateral      LUMPECTOMY LEFT   12/15/2012     Invasive ductal/lobular carcinoma (Invasive mammary 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)    OTHER SURGICAL HISTORY   4/15/14     Lt shoulder s/p reverse shoulder arthroplasty & open biceps tenodesis    RADIATION LEFT   2012    REMV CATARACT EXTRACAP,INSERT LENS Right 7/27/2016     Procedure: RIGHT PHACOEMULSIFICATION OF CATARACT WITH INTRAOCULAR LENS IMPLANT [id number];  Surgeon: Kodak Salazar MD;  Location: [facility]      OB History    No obstetric history on file.         ROS:   GENERAL: feels well otherwise  SKIN: denies any unusual skin lesions  EYES:denies blurred vision or double vision  HEENT: denies nasal congestion, denies sinus pain  LUNGS: denies shortness of breath  CARDIOVASCULAR: denies chest pain denies palpitations  GI: denies abdominal pain,denies heartburn, denies diarrhea or                denies constipation  : denies dysuria, denies incontinence, denies vaginal discharge                  denies itching  MUSCULOSKELETAL: denies back pain  NEURO: denies headaches, denies extremity weakness  PSYCHE: denies depression denies anxiety  HEMATOLOGIC: denies hx of anemia, denies easy bruising or bleeding  ENDOCRINE: denies excessive thirst or urination        PHYSICAL EXAM:   Blood pressure 122/74.     GENERAL: well developed, well nourished, in no apparent distress  HEENT: normocephalic; atraumatic  RESPIRATORY: clear to auscultation  CARDIOVASCULAR: S1, S2 normal, RRR  ABDOMEN: Soft, nondistended; nontender  EXTREMITIES:  non tender without edema  NEUROLOGIC: intact  PSYCHIATRIC: alert and oriented x 3; affect appropriate        ASSESSMENT/ PLAN:   Thickened endometrium     Hysteroscopy with truclear.     Discussed risks and benefits of surgery.  Patient understands the risk of bleeding, infection, risk of anesthesia and possible damage to internal organs which could require more surgery at the time of surgery or in the future, as well as possibility of blood clots.

## 2024-02-28 NOTE — ANESTHESIA PREPROCEDURE EVALUATION
Anesthesia PreOp Note    HPI:     Mary Petersen is a 83 year old female who presents for preoperative consultation requested by: Eduardo Hassan MD    Date of Surgery: 2/28/2024    Procedure(s):  Hysteroscopy with truclear  Indication: Thickened endometrium    Relevant Problems   No relevant active problems       NPO:  Last Liquid Consumption Date: 02/27/24  Last Liquid Consumption Time: 1830  Last Solid Consumption Date: 02/27/24  Last Solid Consumption Time: 1830  Last Liquid Consumption Date: 02/27/24          History Review:  Patient Active Problem List    Diagnosis Date Noted    Acute deep vein thrombosis (DVT) of proximal vein of left lower extremity (HCC) 02/22/2021    Mixed stress and urge urinary incontinence 02/22/2021    BMI 40.0-44.9, adult ([facility]) 07/20/2020    SX 10/7/19 bilateral neuroplasty median nerve at carpal tunnel- Dr. Maya- [facility]LO[facility] EXP 11/26/19 10/22/2019    CAD in native artery 09/11/2018    Other cardiomyopathies ([facility]) 08/02/2018    History of ST elevation myocardial infarction (STEMI) 07/28/2018    Morbid obesity ([facility]) 03/14/2018    Encounter for long-term (current) use of high-risk medication 04/28/2017    Status post bilateral cataract extraction 02/21/2017    Cortical cataract of both eyes 05/03/2016    Pre-diabetes 01/11/2016    Vasomotor rhinitis 01/11/2016    JIAN (obstructive sleep apnea) 10/28/2015    Hypercholesterolemia 10/14/2015    Rheumatoid arthritis with positive rheumatoid factor, involving unspecified site (HCC) 02/02/2015    History of breast cancer 12/07/2012       Past Medical History:   Diagnosis Date    Anesthesia complication     Arthritis     RA    Atherosclerosis of coronary artery     Breast cancer (HCC)     Invasive ductal/lobular carcinoma  left(Invasive mammary 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)    Cardiomyopathy (HCC)     DVT (deep venous thrombosis) (HCC)     HYPERLIPIDEMIA     Obesity     OTHER DISEASES     bursitis    PMR (polymyalgia  rheumatica) (HCC)     Pulmonary embolism (HCC)     SLEEP APNEA     severe, , O2 ning 85%, AutoPAP 6-20 [facility]    Sleep apnea        Past Surgical History:   Procedure Laterality Date    APPENDECTOMY  1960    APPENDECTOMY      CHOLECYSTECTOMY      COLONOSCOPY  2006    colon polyps    COLONOSCOPY,BIOPSY  08/29/2011    Performed by VIBHA LEUNG at [facility]    HIP REPLACEMENT SURGERY Right     HX BREAST CANCER      KNEE REPLACEMENT SURGERY Bilateral     LUMPECTOMY LEFT  12/15/2012    Invasive ductal/lobular carcinoma (Invasive mammary 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)    OTHER SURGICAL HISTORY  04/15/2014    Lt shoulder s/p reverse shoulder arthroplasty & open biceps tenodesis    RADIATION LEFT  2012    REMV CATARACT EXTRACAP,INSERT LENS Right 07/27/2016    Procedure: RIGHT PHACOEMULSIFICATION OF CATARACT WITH INTRAOCULAR LENS IMPLANT [id number];  Surgeon: Kodak Salazar MD;  Location: [facility]    TOTAL HIP REPLACEMENT         Medications Prior to Admission   Medication Sig Dispense Refill Last Dose    furosemide 20 MG Oral Tab Take 1 tablet (20 mg total) by mouth daily.       gabapentin 100 MG Oral Cap Take 1 capsule (100 mg total) by mouth every morning.       gabapentin 400 MG Oral Cap Take 1 capsule (400 mg total) by mouth nightly.       FOLIC ACID 1 MG Oral Tab TAKE 1 TABLET EVERY DAY 90 tablet 3     METOPROLOL SUCCINATE ER 25 MG Oral Tablet 24 Hr TAKE 1/2 TABLET EVERY DAY  (BETA  BLOCKER) 45 tablet 1     PREDNISONE 5 MG Oral Tab TAKE 1 TABLET EVERY DAY 90 tablet 0     rosuvastatin (CRESTOR) 10 MG Oral Tab Take 1 tablet (10 mg total) by mouth nightly. (Patient taking differently: Take 0.5 tablets (5 mg total) by mouth nightly.) 90 tablet 3     methotrexate 2.5 MG Oral Tab Take 9 tablets (22.5 mg total) by mouth every 7 days. 10 tabs PO once a week (Patient taking differently: Take 9 tablets (22.5 mg total) by mouth every 7 days. 8 tabs PO once a  week on Sunday) 130 tablet 1     apixaban 5 MG Oral Tab Take 1 tablet (5 mg total) by mouth 2 (two) times daily. 180 tablet 3 2/26/2024    Vitamin B-12 1000 MCG Oral Tab Take 1 tablet (1,000 mcg total) by mouth.       clotrimazole-betamethasone 1-0.05 % External Cream Apply to affected area bid prn 45 g 3     ALENDRONATE 70 MG Oral Tab TAKE 1 TABLET ONE TIME WEEKLY 12 tablet 3     EXTRA STRENGTH ACETAMINOPHEN OR Take by mouth.         ergocalciferol 36942 units Oral Cap Take 1 capsule (50,000 Units total) by mouth once a week. 12 capsule 1     Aspirin 81 MG Oral Tab Take 1 tablet (81 mg total) by mouth daily.       Cranberry Extract 250 MG Oral Tab Take  by mouth 2 (two) times daily.       Calcium Carbonate-Vitamin D 600-125 MG-UNIT Oral Tab Take  by mouth 2 (two) times daily.        Current Facility-Administered Medications Ordered in Epic   Medication Dose Route Frequency Provider Last Rate Last Admin    lactated ringers infusion   Intravenous Continuous Eduardo Hassan MD        acetaminophen (Tylenol Extra Strength) tab 1,000 mg  1,000 mg Oral Once Eduardo Hassan MD        metoprolol tartrate (Lopressor) tab 25 mg  25 mg Oral Once PRN Eduardo Hassan MD        famotidine (Pepcid) tab 20 mg  20 mg Oral Once Eduardo Hassan MD        Or    famotidine (Pepcid) 20 mg/2mL injection 20 mg  20 mg Intravenous Once Eduardo Hassan MD        metoclopramide (Reglan) tab 10 mg  10 mg Oral Once Eduardo Hassan MD        Or    metoclopramide (Reglan) 5 mg/mL injection 10 mg  10 mg Intravenous Once Eduardo Hassan MD        doxycycline (Vibramycin) cap 100 mg  100 mg Oral Once Eduardo Hassan MD         No current [facility]-ordered outpatient medications on file.       Allergies   Allergen Reactions    Diflucan [Fluconazole] ANAPHYLAXIS    Erythromycin Base NAUSEA AND VOMITING    Pcn [Penicillins] RASH       Family History   Problem Relation Age of Onset    Cancer Mother 70        colon cancer    Clotting Disorder Mother      Other (Other) Mother         unknown type of arthritis    Cancer Maternal Grandfather 70        colon cancer    Other (Other) Sister         colon polyps    Other (rheumatoid arthritis) Sister     Heart Disorder Father     Other (Other) Son         rheumatoid arthritis    Clotting Disorder Brother         DVT/PE in youngest brother    Heart Disorder Brother     Obesity Brother     Diabetes Brother      Social History     Socioeconomic History    Marital status:    Tobacco Use    Smoking status: Former     Packs/day: 3.00     Years: 25.00     Additional pack years: 0.00     Total pack years: 75.00     Types: Cigarettes     Quit date: 9/3/1980     Years since quittin.5    Smokeless tobacco: Never   Vaping Use    Vaping Use: Never used   Substance and Sexual Activity    Alcohol use: No     Alcohol/week: 0.0 standard drinks of alcohol    Drug use: No    Sexual activity: Never       Available pre-op labs reviewed.             Vital Signs:  Body mass index is 36.95 kg/m².   height is 1.727 m (5' 8\") and weight is 110.2 kg (243 lb).   Vitals:    24 1151   Weight: 110.2 kg (243 lb)   Height: 1.727 m (5' 8\")        Anesthesia Evaluation     Patient summary reviewed and Nursing notes reviewed    No history of anesthetic complications   Airway   Mallampati: II  Neck ROM: full  Dental      Pulmonary - negative ROS and normal exam   (+) sleep apnea  Cardiovascular - negative ROS and normal exam  (+) CAD    Neuro/Psych - negative ROS     GI/Hepatic/Renal - negative ROS     Endo/Other - negative ROS   (+) arthritis  Abdominal  - normal exam  (+) obese                 Anesthesia Plan:   ASA:  3  Plan:   MAC  Informed Consent Plan and Risks Discussed With:  Patient  Discussed plan with:  CRNA      I have informed Mary Petersen and/or legal guardian or family member of the nature of the anesthetic plan, benefits, risks including possible dental damage if relevant, major complications, and any alternative  forms of anesthetic management.   All of the patient's questions were answered to the best of my ability. The patient desires the anesthetic management as planned.  PHILOMENA CHAO MD  2/28/2024 7:48 AM  Present on Admission:  **None**

## 2024-04-16 ENCOUNTER — LAB ENCOUNTER (OUTPATIENT)
Dept: LAB | Age: 84
End: 2024-04-16
Attending: ORTHOPAEDIC SURGERY
Payer: MEDICARE

## 2024-04-16 DIAGNOSIS — Z01.818 PRE-OP TESTING: ICD-10-CM

## 2024-04-16 PROCEDURE — 87641 MR-STAPH DNA AMP PROBE: CPT

## 2024-04-17 LAB — MRSA DNA SPEC QL NAA+PROBE: NEGATIVE

## 2024-04-18 RX ORDER — ONDANSETRON 4 MG/1
4 TABLET, FILM COATED ORAL EVERY 8 HOURS PRN
Qty: 20 TABLET | Refills: 0 | Status: SHIPPED | OUTPATIENT
Start: 2024-04-18 | End: 2024-04-21

## 2024-04-18 RX ORDER — TRAMADOL HYDROCHLORIDE 50 MG/1
50 TABLET ORAL EVERY 6 HOURS PRN
Qty: 20 TABLET | Refills: 0 | Status: SHIPPED | OUTPATIENT
Start: 2024-04-18 | End: 2024-04-21

## 2024-04-18 RX ORDER — SENNA AND DOCUSATE SODIUM 50; 8.6 MG/1; MG/1
2 TABLET, FILM COATED ORAL EVERY MORNING
Qty: 60 TABLET | Refills: 0 | Status: SHIPPED | OUTPATIENT
Start: 2024-04-18 | End: 2024-04-21

## 2024-04-18 RX ORDER — OXYCODONE HYDROCHLORIDE 5 MG/1
5 TABLET ORAL EVERY 4 HOURS PRN
Qty: 20 TABLET | Refills: 0 | Status: SHIPPED | OUTPATIENT
Start: 2024-04-18 | End: 2024-04-21

## 2024-04-19 ENCOUNTER — HOSPITAL ENCOUNTER (OUTPATIENT)
Facility: HOSPITAL | Age: 84
Discharge: NURSING, REHAB OR LONG-TERM CARE | End: 2024-04-24
Attending: ORTHOPAEDIC SURGERY | Admitting: ORTHOPAEDIC SURGERY
Payer: MEDICARE

## 2024-04-19 ENCOUNTER — ANESTHESIA (OUTPATIENT)
Dept: SURGERY | Facility: HOSPITAL | Age: 84
End: 2024-04-19
Payer: MEDICARE

## 2024-04-19 ENCOUNTER — APPOINTMENT (OUTPATIENT)
Dept: GENERAL RADIOLOGY | Facility: HOSPITAL | Age: 84
End: 2024-04-19
Attending: ORTHOPAEDIC SURGERY
Payer: MEDICARE

## 2024-04-19 ENCOUNTER — ANESTHESIA EVENT (OUTPATIENT)
Dept: SURGERY | Facility: HOSPITAL | Age: 84
End: 2024-04-19
Payer: MEDICARE

## 2024-04-19 DIAGNOSIS — M12.811 ROTATOR CUFF ARTHROPATHY, RIGHT: ICD-10-CM

## 2024-04-19 DIAGNOSIS — Z01.818 PRE-OP TESTING: Primary | ICD-10-CM

## 2024-04-19 PROBLEM — Z96.611 AFTERCARE FOLLOWING RIGHT SHOULDER JOINT REPLACEMENT SURGERY: Status: ACTIVE | Noted: 2024-04-19

## 2024-04-19 PROBLEM — Z47.1 AFTERCARE FOLLOWING RIGHT SHOULDER JOINT REPLACEMENT SURGERY: Status: ACTIVE | Noted: 2024-04-19

## 2024-04-19 LAB — GLUCOSE BLDC GLUCOMTR-MCNC: 141 MG/DL (ref 70–99)

## 2024-04-19 PROCEDURE — 64415 NJX AA&/STRD BRCH PLXS IMG: CPT | Performed by: ORTHOPAEDIC SURGERY

## 2024-04-19 PROCEDURE — 94660 CPAP INITIATION&MGMT: CPT

## 2024-04-19 PROCEDURE — 82962 GLUCOSE BLOOD TEST: CPT

## 2024-04-19 PROCEDURE — 88311 DECALCIFY TISSUE: CPT | Performed by: ORTHOPAEDIC SURGERY

## 2024-04-19 PROCEDURE — 73030 X-RAY EXAM OF SHOULDER: CPT | Performed by: ORTHOPAEDIC SURGERY

## 2024-04-19 PROCEDURE — 88305 TISSUE EXAM BY PATHOLOGIST: CPT | Performed by: ORTHOPAEDIC SURGERY

## 2024-04-19 PROCEDURE — 0RRJ00Z REPLACEMENT OF RIGHT SHOULDER JOINT WITH REVERSE BALL AND SOCKET SYNTHETIC SUBSTITUTE, OPEN APPROACH: ICD-10-PCS | Performed by: ORTHOPAEDIC SURGERY

## 2024-04-19 DEVICE — IMPLANTABLE DEVICE
Type: IMPLANTABLE DEVICE | Site: SHOULDER | Status: FUNCTIONAL
Brand: TORNIER PERFORM® REVERSED GLENOID

## 2024-04-19 DEVICE — SCREW BNE 5X18MM PERIPH THRD GLEN REV: Type: IMPLANTABLE DEVICE | Site: SHOULDER | Status: FUNCTIONAL

## 2024-04-19 DEVICE — IMPLANTABLE DEVICE
Type: IMPLANTABLE DEVICE | Site: SHOULDER | Status: FUNCTIONAL
Brand: TORNIER PERFORM™ HUMERAL SYSTEM

## 2024-04-19 DEVICE — SCREW BNE 5X42MM PERIPH GLEN PERFORM: Type: IMPLANTABLE DEVICE | Site: SHOULDER | Status: FUNCTIONAL

## 2024-04-19 DEVICE — SCREW BNE 5X26MM PERIPH GLEN PERFORM: Type: IMPLANTABLE DEVICE | Site: SHOULDER | Status: FUNCTIONAL

## 2024-04-19 DEVICE — SCREW BNE 6.5X30MM CNTRL GLEN THRD PERFORM: Type: IMPLANTABLE DEVICE | Site: SHOULDER | Status: FUNCTIONAL

## 2024-04-19 DEVICE — IMPLANTABLE DEVICE
Type: IMPLANTABLE DEVICE | Site: SHOULDER | Status: FUNCTIONAL
Brand: TORNIER PERFORM® REVERSED AUGMENTED GLENOID

## 2024-04-19 DEVICE — IMPLANTABLE DEVICE: Type: IMPLANTABLE DEVICE | Site: SHOULDER | Status: FUNCTIONAL

## 2024-04-19 DEVICE — SCREW BNE 5X38MM PERIPH GLEN PERFORM: Type: IMPLANTABLE DEVICE | Site: SHOULDER | Status: FUNCTIONAL

## 2024-04-19 RX ORDER — NICOTINE POLACRILEX 4 MG
15 LOZENGE BUCCAL
Status: DISCONTINUED | OUTPATIENT
Start: 2024-04-19 | End: 2024-04-19 | Stop reason: HOSPADM

## 2024-04-19 RX ORDER — DOCUSATE SODIUM 100 MG/1
100 CAPSULE, LIQUID FILLED ORAL 2 TIMES DAILY
Status: DISCONTINUED | OUTPATIENT
Start: 2024-04-19 | End: 2024-04-24

## 2024-04-19 RX ORDER — OXYCODONE HYDROCHLORIDE 5 MG/1
5 TABLET ORAL EVERY 4 HOURS PRN
Status: DISCONTINUED | OUTPATIENT
Start: 2024-04-19 | End: 2024-04-24

## 2024-04-19 RX ORDER — MORPHINE SULFATE 4 MG/ML
4 INJECTION, SOLUTION INTRAMUSCULAR; INTRAVENOUS EVERY 10 MIN PRN
Status: DISCONTINUED | OUTPATIENT
Start: 2024-04-19 | End: 2024-04-19 | Stop reason: HOSPADM

## 2024-04-19 RX ORDER — ROSUVASTATIN CALCIUM 5 MG/1
5 TABLET, COATED ORAL NIGHTLY
Status: DISCONTINUED | OUTPATIENT
Start: 2024-04-19 | End: 2024-04-24

## 2024-04-19 RX ORDER — BISACODYL 10 MG
10 SUPPOSITORY, RECTAL RECTAL
Status: DISCONTINUED | OUTPATIENT
Start: 2024-04-19 | End: 2024-04-24

## 2024-04-19 RX ORDER — METOCLOPRAMIDE 10 MG/1
10 TABLET ORAL ONCE
Status: COMPLETED | OUTPATIENT
Start: 2024-04-19 | End: 2024-04-19

## 2024-04-19 RX ORDER — FAMOTIDINE 10 MG/ML
20 INJECTION, SOLUTION INTRAVENOUS ONCE
Status: COMPLETED | OUTPATIENT
Start: 2024-04-19 | End: 2024-04-19

## 2024-04-19 RX ORDER — ENEMA 19; 7 G/133ML; G/133ML
1 ENEMA RECTAL ONCE AS NEEDED
Status: DISCONTINUED | OUTPATIENT
Start: 2024-04-19 | End: 2024-04-24

## 2024-04-19 RX ORDER — NALOXONE HYDROCHLORIDE 0.4 MG/ML
80 INJECTION, SOLUTION INTRAMUSCULAR; INTRAVENOUS; SUBCUTANEOUS AS NEEDED
Status: DISCONTINUED | OUTPATIENT
Start: 2024-04-19 | End: 2024-04-19 | Stop reason: HOSPADM

## 2024-04-19 RX ORDER — OXYCODONE HYDROCHLORIDE 5 MG/1
2.5 TABLET ORAL EVERY 4 HOURS PRN
Status: DISCONTINUED | OUTPATIENT
Start: 2024-04-19 | End: 2024-04-24

## 2024-04-19 RX ORDER — MORPHINE SULFATE 10 MG/ML
6 INJECTION, SOLUTION INTRAMUSCULAR; INTRAVENOUS EVERY 10 MIN PRN
Status: DISCONTINUED | OUTPATIENT
Start: 2024-04-19 | End: 2024-04-19 | Stop reason: HOSPADM

## 2024-04-19 RX ORDER — NICOTINE POLACRILEX 4 MG
30 LOZENGE BUCCAL
Status: DISCONTINUED | OUTPATIENT
Start: 2024-04-19 | End: 2024-04-19 | Stop reason: HOSPADM

## 2024-04-19 RX ORDER — HYDROMORPHONE HYDROCHLORIDE 1 MG/ML
0.6 INJECTION, SOLUTION INTRAMUSCULAR; INTRAVENOUS; SUBCUTANEOUS EVERY 5 MIN PRN
Status: DISCONTINUED | OUTPATIENT
Start: 2024-04-19 | End: 2024-04-19 | Stop reason: HOSPADM

## 2024-04-19 RX ORDER — HYDROMORPHONE HYDROCHLORIDE 1 MG/ML
0.4 INJECTION, SOLUTION INTRAMUSCULAR; INTRAVENOUS; SUBCUTANEOUS EVERY 2 HOUR PRN
Status: DISCONTINUED | OUTPATIENT
Start: 2024-04-19 | End: 2024-04-24

## 2024-04-19 RX ORDER — SENNOSIDES 8.6 MG
17.2 TABLET ORAL NIGHTLY
Status: DISCONTINUED | OUTPATIENT
Start: 2024-04-19 | End: 2024-04-24

## 2024-04-19 RX ORDER — METOPROLOL TARTRATE 1 MG/ML
2.5 INJECTION, SOLUTION INTRAVENOUS ONCE
Status: DISCONTINUED | OUTPATIENT
Start: 2024-04-19 | End: 2024-04-19 | Stop reason: HOSPADM

## 2024-04-19 RX ORDER — DEXTROSE MONOHYDRATE 25 G/50ML
50 INJECTION, SOLUTION INTRAVENOUS
Status: DISCONTINUED | OUTPATIENT
Start: 2024-04-19 | End: 2024-04-19 | Stop reason: HOSPADM

## 2024-04-19 RX ORDER — MORPHINE SULFATE 4 MG/ML
2 INJECTION, SOLUTION INTRAMUSCULAR; INTRAVENOUS EVERY 10 MIN PRN
Status: DISCONTINUED | OUTPATIENT
Start: 2024-04-19 | End: 2024-04-19 | Stop reason: HOSPADM

## 2024-04-19 RX ORDER — ROCURONIUM BROMIDE 10 MG/ML
INJECTION, SOLUTION INTRAVENOUS AS NEEDED
Status: DISCONTINUED | OUTPATIENT
Start: 2024-04-19 | End: 2024-04-19 | Stop reason: SURG

## 2024-04-19 RX ORDER — FOLIC ACID 1 MG/1
1 TABLET ORAL DAILY
Status: DISCONTINUED | OUTPATIENT
Start: 2024-04-20 | End: 2024-04-24

## 2024-04-19 RX ORDER — EPHEDRINE SULFATE 50 MG/ML
INJECTION, SOLUTION INTRAVENOUS AS NEEDED
Status: DISCONTINUED | OUTPATIENT
Start: 2024-04-19 | End: 2024-04-19 | Stop reason: SURG

## 2024-04-19 RX ORDER — CEFAZOLIN SODIUM/WATER 2 G/20 ML
2 SYRINGE (ML) INTRAVENOUS ONCE
Status: DISCONTINUED | OUTPATIENT
Start: 2024-04-19 | End: 2024-04-19 | Stop reason: HOSPADM

## 2024-04-19 RX ORDER — ONDANSETRON 2 MG/ML
4 INJECTION INTRAMUSCULAR; INTRAVENOUS EVERY 6 HOURS PRN
Status: DISCONTINUED | OUTPATIENT
Start: 2024-04-19 | End: 2024-04-19 | Stop reason: HOSPADM

## 2024-04-19 RX ORDER — SODIUM CHLORIDE, SODIUM LACTATE, POTASSIUM CHLORIDE, CALCIUM CHLORIDE 600; 310; 30; 20 MG/100ML; MG/100ML; MG/100ML; MG/100ML
INJECTION, SOLUTION INTRAVENOUS CONTINUOUS
Status: DISCONTINUED | OUTPATIENT
Start: 2024-04-19 | End: 2024-04-19 | Stop reason: ALTCHOICE

## 2024-04-19 RX ORDER — PHENYLEPHRINE HCL 10 MG/ML
VIAL (ML) INJECTION AS NEEDED
Status: DISCONTINUED | OUTPATIENT
Start: 2024-04-19 | End: 2024-04-19 | Stop reason: SURG

## 2024-04-19 RX ORDER — ONDANSETRON 2 MG/ML
4 INJECTION INTRAMUSCULAR; INTRAVENOUS EVERY 6 HOURS PRN
Status: DISCONTINUED | OUTPATIENT
Start: 2024-04-19 | End: 2024-04-24

## 2024-04-19 RX ORDER — HYDROMORPHONE HYDROCHLORIDE 1 MG/ML
0.2 INJECTION, SOLUTION INTRAMUSCULAR; INTRAVENOUS; SUBCUTANEOUS EVERY 2 HOUR PRN
Status: DISCONTINUED | OUTPATIENT
Start: 2024-04-19 | End: 2024-04-24

## 2024-04-19 RX ORDER — SODIUM CHLORIDE, SODIUM LACTATE, POTASSIUM CHLORIDE, CALCIUM CHLORIDE 600; 310; 30; 20 MG/100ML; MG/100ML; MG/100ML; MG/100ML
INJECTION, SOLUTION INTRAVENOUS CONTINUOUS
Status: DISCONTINUED | OUTPATIENT
Start: 2024-04-19 | End: 2024-04-19 | Stop reason: HOSPADM

## 2024-04-19 RX ORDER — LIDOCAINE HYDROCHLORIDE 10 MG/ML
INJECTION, SOLUTION EPIDURAL; INFILTRATION; INTRACAUDAL; PERINEURAL AS NEEDED
Status: DISCONTINUED | OUTPATIENT
Start: 2024-04-19 | End: 2024-04-19 | Stop reason: SURG

## 2024-04-19 RX ORDER — ACETAMINOPHEN 325 MG/1
650 TABLET ORAL
Status: DISCONTINUED | OUTPATIENT
Start: 2024-04-19 | End: 2024-04-24

## 2024-04-19 RX ORDER — PREDNISONE 5 MG/1
5 TABLET ORAL DAILY
Status: DISCONTINUED | OUTPATIENT
Start: 2024-04-20 | End: 2024-04-24

## 2024-04-19 RX ORDER — METOCLOPRAMIDE HYDROCHLORIDE 5 MG/ML
10 INJECTION INTRAMUSCULAR; INTRAVENOUS EVERY 8 HOURS PRN
Status: DISCONTINUED | OUTPATIENT
Start: 2024-04-19 | End: 2024-04-19 | Stop reason: HOSPADM

## 2024-04-19 RX ORDER — GABAPENTIN 100 MG/1
100 CAPSULE ORAL EVERY MORNING
Status: DISCONTINUED | OUTPATIENT
Start: 2024-04-20 | End: 2024-04-24

## 2024-04-19 RX ORDER — GLYCOPYRROLATE 0.2 MG/ML
INJECTION, SOLUTION INTRAMUSCULAR; INTRAVENOUS AS NEEDED
Status: DISCONTINUED | OUTPATIENT
Start: 2024-04-19 | End: 2024-04-19 | Stop reason: SURG

## 2024-04-19 RX ORDER — TRANEXAMIC ACID 100 MG/ML
INJECTION, SOLUTION INTRAVENOUS AS NEEDED
Status: DISCONTINUED | OUTPATIENT
Start: 2024-04-19 | End: 2024-04-19 | Stop reason: SURG

## 2024-04-19 RX ORDER — ROPIVACAINE HYDROCHLORIDE 5 MG/ML
INJECTION, SOLUTION EPIDURAL; INFILTRATION; PERINEURAL
Status: COMPLETED | OUTPATIENT
Start: 2024-04-19 | End: 2024-04-19

## 2024-04-19 RX ORDER — METOCLOPRAMIDE HYDROCHLORIDE 5 MG/ML
10 INJECTION INTRAMUSCULAR; INTRAVENOUS EVERY 8 HOURS PRN
Status: DISCONTINUED | OUTPATIENT
Start: 2024-04-19 | End: 2024-04-24

## 2024-04-19 RX ORDER — METOCLOPRAMIDE HYDROCHLORIDE 5 MG/ML
10 INJECTION INTRAMUSCULAR; INTRAVENOUS ONCE
Status: COMPLETED | OUTPATIENT
Start: 2024-04-19 | End: 2024-04-19

## 2024-04-19 RX ORDER — HYDROMORPHONE HYDROCHLORIDE 1 MG/ML
0.2 INJECTION, SOLUTION INTRAMUSCULAR; INTRAVENOUS; SUBCUTANEOUS EVERY 5 MIN PRN
Status: DISCONTINUED | OUTPATIENT
Start: 2024-04-19 | End: 2024-04-19 | Stop reason: HOSPADM

## 2024-04-19 RX ORDER — DIPHENHYDRAMINE HYDROCHLORIDE 50 MG/ML
25 INJECTION INTRAMUSCULAR; INTRAVENOUS ONCE AS NEEDED
Status: ACTIVE | OUTPATIENT
Start: 2024-04-19 | End: 2024-04-19

## 2024-04-19 RX ORDER — CEFAZOLIN SODIUM/WATER 2 G/20 ML
2 SYRINGE (ML) INTRAVENOUS EVERY 8 HOURS
Qty: 40 ML | Refills: 0 | Status: COMPLETED | OUTPATIENT
Start: 2024-04-19 | End: 2024-04-19

## 2024-04-19 RX ORDER — SODIUM CHLORIDE, SODIUM LACTATE, POTASSIUM CHLORIDE, CALCIUM CHLORIDE 600; 310; 30; 20 MG/100ML; MG/100ML; MG/100ML; MG/100ML
INJECTION, SOLUTION INTRAVENOUS CONTINUOUS
Status: DISCONTINUED | OUTPATIENT
Start: 2024-04-19 | End: 2024-04-24

## 2024-04-19 RX ORDER — ONDANSETRON 2 MG/ML
INJECTION INTRAMUSCULAR; INTRAVENOUS AS NEEDED
Status: DISCONTINUED | OUTPATIENT
Start: 2024-04-19 | End: 2024-04-19 | Stop reason: SURG

## 2024-04-19 RX ORDER — MIDAZOLAM HYDROCHLORIDE 1 MG/ML
INJECTION INTRAMUSCULAR; INTRAVENOUS
Status: COMPLETED | OUTPATIENT
Start: 2024-04-19 | End: 2024-04-19

## 2024-04-19 RX ORDER — HYDROMORPHONE HYDROCHLORIDE 1 MG/ML
0.4 INJECTION, SOLUTION INTRAMUSCULAR; INTRAVENOUS; SUBCUTANEOUS EVERY 5 MIN PRN
Status: DISCONTINUED | OUTPATIENT
Start: 2024-04-19 | End: 2024-04-19 | Stop reason: HOSPADM

## 2024-04-19 RX ORDER — DEXAMETHASONE SODIUM PHOSPHATE 4 MG/ML
VIAL (ML) INJECTION AS NEEDED
Status: DISCONTINUED | OUTPATIENT
Start: 2024-04-19 | End: 2024-04-19 | Stop reason: SURG

## 2024-04-19 RX ORDER — CEFAZOLIN SODIUM/WATER 2 G/20 ML
SYRINGE (ML) INTRAVENOUS AS NEEDED
Status: DISCONTINUED | OUTPATIENT
Start: 2024-04-19 | End: 2024-04-19 | Stop reason: SURG

## 2024-04-19 RX ORDER — FAMOTIDINE 20 MG/1
20 TABLET, FILM COATED ORAL ONCE
Status: COMPLETED | OUTPATIENT
Start: 2024-04-19 | End: 2024-04-19

## 2024-04-19 RX ORDER — POLYETHYLENE GLYCOL 3350 17 G/17G
17 POWDER, FOR SOLUTION ORAL DAILY PRN
Status: DISCONTINUED | OUTPATIENT
Start: 2024-04-19 | End: 2024-04-24

## 2024-04-19 RX ORDER — ACETAMINOPHEN 500 MG
1000 TABLET ORAL ONCE
Status: DISCONTINUED | OUTPATIENT
Start: 2024-04-19 | End: 2024-04-19 | Stop reason: HOSPADM

## 2024-04-19 RX ORDER — GABAPENTIN 400 MG/1
400 CAPSULE ORAL NIGHTLY
Status: DISCONTINUED | OUTPATIENT
Start: 2024-04-19 | End: 2024-04-24

## 2024-04-19 RX ADMIN — SODIUM CHLORIDE, SODIUM LACTATE, POTASSIUM CHLORIDE, CALCIUM CHLORIDE: 600; 310; 30; 20 INJECTION, SOLUTION INTRAVENOUS at 09:14:00

## 2024-04-19 RX ADMIN — TRANEXAMIC ACID 1000 MG: 100 INJECTION, SOLUTION INTRAVENOUS at 07:37:00

## 2024-04-19 RX ADMIN — ONDANSETRON 4 MG: 2 INJECTION INTRAMUSCULAR; INTRAVENOUS at 09:02:00

## 2024-04-19 RX ADMIN — GLYCOPYRROLATE 0.1 MG: 0.2 INJECTION, SOLUTION INTRAMUSCULAR; INTRAVENOUS at 07:31:00

## 2024-04-19 RX ADMIN — PHENYLEPHRINE HCL 100 MCG: 10 MG/ML VIAL (ML) INJECTION at 07:36:00

## 2024-04-19 RX ADMIN — EPHEDRINE SULFATE 10 MG: 50 INJECTION, SOLUTION INTRAVENOUS at 07:30:00

## 2024-04-19 RX ADMIN — PHENYLEPHRINE HCL 100 MCG: 10 MG/ML VIAL (ML) INJECTION at 07:43:00

## 2024-04-19 RX ADMIN — PHENYLEPHRINE HCL 100 MCG: 10 MG/ML VIAL (ML) INJECTION at 08:15:00

## 2024-04-19 RX ADMIN — ROPIVACAINE HYDROCHLORIDE 30 ML: 5 INJECTION, SOLUTION EPIDURAL; INFILTRATION; PERINEURAL at 07:06:00

## 2024-04-19 RX ADMIN — MIDAZOLAM HYDROCHLORIDE 2 MG: 1 INJECTION INTRAMUSCULAR; INTRAVENOUS at 07:06:00

## 2024-04-19 RX ADMIN — EPHEDRINE SULFATE 5 MG: 50 INJECTION, SOLUTION INTRAVENOUS at 07:43:00

## 2024-04-19 RX ADMIN — CEFAZOLIN SODIUM/WATER 2 G: 2 G/20 ML SYRINGE (ML) INTRAVENOUS at 07:12:00

## 2024-04-19 RX ADMIN — EPHEDRINE SULFATE 5 MG: 50 INJECTION, SOLUTION INTRAVENOUS at 07:36:00

## 2024-04-19 RX ADMIN — SODIUM CHLORIDE, SODIUM LACTATE, POTASSIUM CHLORIDE, CALCIUM CHLORIDE: 600; 310; 30; 20 INJECTION, SOLUTION INTRAVENOUS at 07:12:00

## 2024-04-19 RX ADMIN — ROCURONIUM BROMIDE 30 MG: 10 INJECTION, SOLUTION INTRAVENOUS at 07:33:00

## 2024-04-19 RX ADMIN — ROCURONIUM BROMIDE 5 MG: 10 INJECTION, SOLUTION INTRAVENOUS at 07:22:00

## 2024-04-19 RX ADMIN — LIDOCAINE HYDROCHLORIDE 50 MG: 10 INJECTION, SOLUTION EPIDURAL; INFILTRATION; INTRACAUDAL; PERINEURAL at 07:22:00

## 2024-04-19 RX ADMIN — DEXAMETHASONE SODIUM PHOSPHATE 8 MG: 4 MG/ML VIAL (ML) INJECTION at 07:31:00

## 2024-04-19 RX ADMIN — SODIUM CHLORIDE, SODIUM LACTATE, POTASSIUM CHLORIDE, CALCIUM CHLORIDE: 600; 310; 30; 20 INJECTION, SOLUTION INTRAVENOUS at 09:25:00

## 2024-04-19 RX ADMIN — SODIUM CHLORIDE, SODIUM LACTATE, POTASSIUM CHLORIDE, CALCIUM CHLORIDE: 600; 310; 30; 20 INJECTION, SOLUTION INTRAVENOUS at 08:15:00

## 2024-04-19 NOTE — CONGREGATE LIVING REVIEW
[facility] Living Authorization    The [facility] Review Committee has reviewed this case and the patient IS APPROVED for discharge to a facility for Short Term Skilled once the following procedure is followed:     - The physician discharge instructions (contained within the RASHMI note for SNF) must inlcude the below appropriate and approved COVID instructions to the facility    For questions regarding CLRC approval process, please contact the CM assigned to the case.  For questions regarding RN discharge workflow, please contact the unit Clinical Leader.

## 2024-04-19 NOTE — ANESTHESIA PROCEDURE NOTES
Peripheral Block    Date/Time: 4/19/2024 7:06 AM    Performed by: García Gallego MD  Authorized by: García Gallego MD      General Information and Staff    Start Time:  4/19/2024 7:06 AM  Anesthesiologist:  García Gallego MD  Performed by:  Anesthesiologist  Patient Location:  PACU      Site Identification: real time ultrasound guided and image stored and retrievable    Block site/laterality marked before start: site marked  Reason for Block: at surgeon's request and post-op pain management    Preanesthetic Checklist: 2 patient identifers, IV checked, risks and benefits discussed, monitors and equipment checked, pre-op evaluation, timeout performed, anesthesia consent and sterile technique used      Procedure Details    Patient Position:  Sitting  Prep: ChloraPrep    Monitoring:  Cardiac monitor  Block Type:  Interscalene  Laterality:  Right  Injection Technique:  Single-shot    Needle    Needle Type:  Short-bevel  Needle Gauge:  22 G  Needle Length:  50 mm  Needle Localization:  Ultrasound guidance              Assessment    Injection Assessment:  Good spread noted, incremental injection, local visualized surrounding nerve on ultrasound, low pressure, negative aspiration for heme and no pain on injection  Heart Rate Change: No        Medications  4/19/2024 7:06 AM  midazolam (VERSED)injection 2mg/2ml - Intravenous   2 mg - 4/19/2024 7:06:00 AM  ropivacaine (NAROPIN) injection 0.5% - Infiltration   30 mL - 4/19/2024 7:06:00 AM    Additional Comments

## 2024-04-19 NOTE — ANESTHESIA PROCEDURE NOTES
Airway  Date/Time: 4/19/2024 7:25 AM  Urgency: Elective    Airway not difficult    General Information and Staff    Patient location during procedure: OR  Anesthesiologist: García Gallego MD  Resident/CRNA: Bhavana Goel CRNA  Performed: CRNA   Performed by: Bhavana Goel CRNA  Authorized by: García Gallego MD      Indications and Patient Condition  Indications for airway management: anesthesia  Sedation level: deep  Preoxygenated: yes  Patient position: sniffing  Mask difficulty assessment: 1 - vent by mask    Final Airway Details  Final airway type: endotracheal airway      Successful airway: ETT  Cuffed: yes   Successful intubation technique: Video laryngoscopy (De La Garza)  Endotracheal tube insertion site: oral  ETT size (mm): 7.5    Cormack-Lehane Classification: grade I - full view of glottis  Measured from: lips  ETT to lips (cm): 21  Number of attempts at approach: 1

## 2024-04-19 NOTE — OPERATIVE REPORT
[facility]    PATIENT'S NAME: ANDI CARBONE   ATTENDING PHYSICIAN: Lashawn March MD   OPERATING PHYSICIAN: Lashawn March MD   PATIENT ACCOUNT#:   [id number]    LOCATION:  [address]  MEDICAL RECORD #:   [id number]       YOB: 1940  ADMISSION DATE:       04/19/2024      OPERATION DATE:  04/19/2024    OPERATIVE REPORT        PREOPERATIVE DIAGNOSIS:  Right shoulder rotator cuff arthropathy.  POSTOPERATIVE DIAGNOSIS:  Right shoulder rotator cuff arthropathy.  PROCEDURE:  Right reverse total shoulder arthroplasty.    ASSISTANT:  Asha Nieves PA-C.  She was essential for performance of the procedure and assisted with patient positioning, prepping and draping, retraction of vital structures, wound closure, and sling placement.     ANESTHESIA:  General and a block.    BLOOD LOSS:  Approximately 200 mL.     COMPLICATIONS:  None.     CONDITION:  The patient was aroused from anesthesia and transferred to the recovery room in stable condition.     INSTRUMENTATION UTILIZED:  Tornier reverse total shoulder arthroplasty with 25 mm +3 baseplate, 36 mm standard glenosphere, 3+ humeral stem, and a size 0, 145 degree polyethylene component.     INDICATIONS:  The patient is an 83-year-old female who based on history, physical examination, and imaging studies was diagnosed as having right shoulder rotator cuff arthropathy.  The risks, indications, and benefits of procedures of both operative and nonoperative treatment were thoroughly described to the patient, and the patient desired surgery.  The surgery recommended was a right reverse total shoulder arthroplasty.  Risks include bleeding, infection, neurovascular injury, chronic pain, chronic disability, failure of the procedure, stiffness, recurrence, potential need for additional surgery, as well as anesthetic complications including death.  The patient consented to the procedure.  All of her questions were answered.  She  was in agreement with the treatment plan.     OPERATIVE TECHNIQUE:  On 04/19/2024, the patient was seen and evaluated preoperatively.  Her right shoulder was identified as the correct operative site.  My initials were placed.  She was transferred to the operating room and transferred to the operating table in supine position.  General anesthesia was induced.  Preoperatively, she received a block.  She was given Ancef as antibiotic prophylaxis within 1 hour of incision time as well as tranexamic acid.  She was placed in a beach chair position.  She had a longitudinal incision beginning at the coracoid process and extending distally with blunt dissection through the deltopectoral interval with retraction of the cephalic vein laterally.  The superior border of the pectoralis major was released and retracted.  The conjoined tendon was released and retracted medially.  The 3 sisters at the inferior margin of the subscapularis were suture ligated.  The subscapularis was tagged and then a tenotomy was performed along the articular surface with release of the glenohumeral ligament.  There was evidence of subluxation of the long head of the biceps tendon and a tenotomy was performed.  There was evidence of a rotator cuff tear involving the upper subscapularis, supraspinatus, infraspinatus tendons with retraction and evidence of osteophytes at the humeral head with rotator cuff arthropathy.  She had release of the inferior capsule to allow for dislocation of the humeral head as well as excision of the labrum around the glenoid.  A tenotomy of the long head of the biceps tendon was performed for later tenodesis.  The patient had osteophytes excised from the humeral head following dislocation and an anatomic humeral head cut was performed.  It was then prepared for a Tornier Perform humeral stem.  The humerus was retracted.  The glenoid was exposed.  The humerus was reamed to a neutral tilt and then prepared for the baseplate.   A 25 mm standard baseplate was placed with center compression screws.  Superior and inferior locking screws were placed but not secured.  Anterior and posterior compression screws were secured and then the locking screws were secured.  Peripheral reamer was utilized and then 36 mm standard glenosphere was press-fit into place and the center set screw was secured.  The humeral head was trialed with 145-degree, size 0 polyethylene component which had good fit and stability.  The trial was removed.  The wound was thoroughly and copiously irrigated.  The humerus had three #2 FiberWire sutures placed through the lesser tuberosity and subscapularis for later repair.  The 3+ humeral stem was press-fit into place with polyethylene component 0, 145-degree press-fit into place, and then the reverse total shoulder arthroplasty was reduced and found to be stable.  The wound was irrigated.  Subscapularis was repaired in transtendinous transosseous fashion with the previously placed #2 FiberWire sutures and then reinforced with a running #2 FiberWire suture incorporating her long head of the biceps.  The wound was then closed in layers following hemostasis with 0 Vicryl deep fascial stitch, 2-0 Vicryl subcutaneous stitch, and a 3-0 Monocryl subcuticular stitch.  A large sterile bulky soft dressing was placed.  She was placed in an UltraSling, aroused from anesthesia, and transferred to the recovery room in stable condition.      DISPOSITION:  She was admitted for postoperative medical management, postoperative pain control, postoperative DVT prophylaxis, and postoperative antibiotic therapy.  The patient is walker dependent secondary to minimal weight on her right upper extremity for the next 6 weeks.  She will likely need a subacute rehabilitation stay until she can be more weightbearing on her right upper extremity.  She will work with therapy during her hospital stay.  All of her questions were answered.  She was in agreement  with the treatment plan.     Dictated By Lashawn March MD  d: 04/19/2024 09:43:57  t: 04/19/2024 10:52:57  [facility] [id number]  JEL/

## 2024-04-19 NOTE — H&P
Mary WERNER Arnoldoliva  12/30/1940  83 year old   female  Lashawn March MD    HPI:   Patient presents with:  Right Shoulder - Follow - Up    The patient complains of pain located in her right shoulder. The pain is the same. She has pain with use and at night. She has limited motion and strength  The patient denies numbness and tingling.  The patient denies skin laceration or breakdown.    ALLERGIES:    Diflucan [Fluconazo* ANAPHYLAXIS  Simvastatin MYALGIA  Erythromycin Base NAUSEA AND VOMITING  Pcn [Penicillins] RASH   Current Outpatient Medications   Medication Sig Dispense Refill   furosemide 20 MG Oral Tab Take 1 tablet (20 mg total) by mouth daily. 90 tablet 0   methotrexate 2.5 MG Oral Tab TAKE 8 TABLETS EVERY WEEK AS DIRECTED 96 tablet 1   alendronate 70 MG Oral Tab TAKE 1 TABLET BY MOUTH ONE TIME WEEKLY 12 tablet 1   folic acid 1 MG Oral Tab Take 1 tablet (1 mg total) by mouth daily. 90 tablet 3   gabapentin 100 MG Oral Cap 1 cap po qam and 4 caps po qpm 450 capsule 3   rosuvastatin 10 MG Oral Tab TAKE 1 TABLET EVERY NIGHT 100 tablet 0   metoprolol succinate ER 25 MG Oral Tablet 24 Hr TAKE 1/2 TABLET EVERY DAY (BETA BLOCKER) 45 tablet 0   predniSONE 5 MG Oral Tab Take 1 tablet (5 mg total) by mouth daily. 90 tablet 3   acetaminophen 500 MG Oral Tab Take 2 tablets (1000 mg total) by mouth every 8 (eight) hours as needed for pain. 60 tablet 0   apixaban (ELIQUIS) 5 MG Oral Tab TAKE 1 TABLET TWICE DAILY 180 tablet 1   clotrimazole-betamethasone 1-0.05 % External Cream Apply to affected area bid prn 45 g 3   Coenzyme Q10 (COQ10) 200 MG Oral Cap Take by mouth.   Vitamin B-12 1000 MCG Oral Tab Take 1,000 mcg by mouth.   mupirocin (BACTROBAN) 2 % External Ointment Apply bid x 7 days 22 g 1   EXTRA STRENGTH ACETAMINOPHEN OR Take by mouth.   ergocalciferol 01751 units Oral Cap Take 1 capsule (50,000 Units total) by mouth once a week. 12 capsule 1   Aspirin 81 MG Oral Tab Take 81 mg by mouth daily.   Calcium  Carbonate-Vitamin D 600-125 MG-UNIT Oral Tab Take by mouth 2 (two) times daily.   Cranberry Extract 250 MG Oral Tab Take by mouth 2 (two) times daily.     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)   DVT (deep venous thrombosis) (HCC)   HYPERLIPIDEMIA   OTHER DISEASES   bursitis   PMR (polymyalgia rheumatica) (HCC)   SLEEP APNEA   severe, , O2 ning 85%, AutoPAP 6-20 [facility]     Past Surgical History:   Procedure Laterality Date   APPENDECTOMY 1960   APPENDECTOMY   CHOLECYSTECTOMY   COLONOSCOPY 2006   colon polyps   COLONOSCOPY,BIOPSY 8/29/2011   Performed by VIBHA LEUNG at [facility]   HIP REPLACEMENT SURGERY Right   HX BREAST CANCER   KNEE REPLACEMENT SURGERY Bilateral   LUMPECTOMY LEFT 12/15/2012   Invasive ductal/lobular carcinoma (Invasive mammary 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)   OTHER SURGICAL HISTORY 4/15/14   Lt shoulder s/p reverse shoulder arthroplasty & open biceps tenodesis   RADIATION LEFT 2012   REMV CATARACT EXTRACAP,INSERT LENS Right 7/27/2016   Procedure: RIGHT PHACOEMULSIFICATION OF CATARACT WITH INTRAOCULAR LENS IMPLANT 13520; Surgeon: Kodak Salazar MD; Location: [facility]     Family History   Problem Relation Age of Onset   Cancer Mother 70   colon cancer   Clotting Disorder Mother   Other (Other) Mother   unknown type of arthritis   Cancer Maternal Grandfather 70   colon cancer   Other (Other) Sister   colon polyps   Other (rheumatoid arthritis) Sister   Heart Disorder Father   Other (Other) Son   rheumatoid arthritis   Clotting Disorder Brother   DVT/PE in youngest brother   Heart Disorder Brother   Obesity Brother   Diabetes Brother     Social History  Tobacco Use  Smoking status: Former  Packs/day: 3.00  Years: 25.00  Additional pack years: 0.00  Total pack years: 75.00  Types:  Cigarettes  Quit date: 9/3/1980  Years since quittin.5  Smokeless tobacco: Never  Alcohol use: No  Alcohol/week: 0.0 standard drinks of alcohol  Drug use: No        EXAM:   There were no vitals taken for this visit.  The patient is awake and oriented x 3 and overall well appearing. The patient has normal mood.  The patient is non-tender and atraumatic with the exception of their right upper extremity.  The patient's skin is intact and compartments are soft.  The patient's upper extremities are warm and pink with brisk capillary refill and 2+ radial pulses.  Sensation is intact to light touch in axillary, median, ulnar, and radial nerve distributions.  The patient has 5/5 strength in finger flexion, finger extension, EPL, FPL, and interosseous muscle function.  The patient has shoulder range of motion of 60 degrees of elevation, 30 degrees of external rotation, and internal rotation to the sacral area  The patient has 2/5 strength in elevation, 2/5 external rotation, and 5/5 internal rotation.  She has joint line tenderness and crepitus.    IMAGING AND TESTING:  CT of her right shoulder independently reviewed by me today reveals an E1 glenoid and atrophy of her rotator cuff and several glenohumeral arthritis with superior head migration    ASSESSMENT AND PLAN:   Chronic right shoulder pain (primary encounter diagnosis)  Right rotator cuff tear arthropathy    The risks, indications, benefits, and procedures of both operative and non-operative treatment were discussed with the patient.    The patient desired surgery. Surgery recommended was a right reverse total shoulder arthroplasty. Risks include bleeding, infection, neurovascular injury, failure of the procedure, stiffness, and potential need for additional surgery as well as anesthetic complications including death. The patient consented to the procedure. She is walker dependent and requires a SNF postop.    All of their questions were answered and they are in  agreement with the treatment plan.    The patient will return to clinic in 2 weeks postop for further clinical and radiographic evaluation with right shoulder Grashey, outlet, and Valpeau views.

## 2024-04-19 NOTE — CM/SW NOTE
SW followed up on DC planning.     Received MDO for [facility] vs [facility]    Per chart review, Pt is requesting [facility] as she lives home alone    Uses a walker at baseline    Will need PT and OT notes for eval to send to the insurance for [facility] however will depend on insurance for auth    [facility] - sent    PLAN: TBD - pending therapy dev Boyle, LSW, MSW ext. [phone]

## 2024-04-19 NOTE — ANESTHESIA POSTPROCEDURE EVALUATION
Patient: Mary Petersen    Procedure Summary       Date: 04/19/24 Room / Location: [facility] MAIN OR  / [facility] MAIN OR    Anesthesia Start: 0712 Anesthesia Stop: 0930    Procedure: Right reverse total shoulder arthroplasty (Right: Shoulder) Diagnosis: (Chronic right shoulder pain, right rotator cuff tear arthropathy)    Surgeons: Lashawn March MD Anesthesiologist: García Gallego MD    Anesthesia Type: general ASA Status: 3            Anesthesia Type: general    Vitals Value Taken Time   /72 04/19/24 0930   Temp 97.3 °F (36.3 °C) 04/19/24 0930   Pulse 73 04/19/24 0930   Resp 14 04/19/24 0930   SpO2 97 % 04/19/24 0930       [facility] AN Post Evaluation:   Patient Evaluated in PACU  Patient Participation: complete - patient participated  Level of Consciousness: awake  Pain Score: 0  Pain Management: adequate  Airway Patency:patent  Dental exam unchanged from preop  Yes    Cardiovascular Status: acceptable  Respiratory Status: acceptable and nasal cannula  Postoperative Hydration acceptable    BELLA KAUR CRNA  4/19/2024 9:30 AM

## 2024-04-19 NOTE — ANESTHESIA PREPROCEDURE EVALUATION
Anesthesia PreOp Note    HPI:     Mary Petersen is a 83 year old female who presents for preoperative consultation requested by: Lashanw March MD    Date of Surgery: 4/19/2024    Procedure(s):  Right reverse total shoulder arthroplasty  Indication: Chronic right shoulder pain, right rotator cuff tear arthropathy    Relevant Problems   No relevant active problems       NPO:  Last Liquid Consumption Date: 04/18/24  Last Liquid Consumption Time: 2200  Last Solid Consumption Date: 04/18/24  Last Solid Consumption Time: 1800  Last Liquid Consumption Date: 04/18/24          History Review:  Patient Active Problem List    Diagnosis Date Noted   • Acute deep vein thrombosis (DVT) of proximal vein of left lower extremity ([facility]) 02/22/2021   • Mixed stress and urge urinary incontinence 02/22/2021   • BMI 40.0-44.9, adult ([facility]) 07/20/2020   • SX 10/7/19 bilateral neuroplasty median nerve at carpal tunnel- Dr. Maya- DANIEL EXP 11/26/19 10/22/2019   • CAD in native artery 09/11/2018   • Other cardiomyopathies ([facility]) 08/02/2018   • History of ST elevation myocardial infarction (STEMI) 07/28/2018   • Morbid obesity ([facility]) 03/14/2018   • Encounter for long-term (current) use of high-risk medication 04/28/2017   • Status post bilateral cataract extraction 02/21/2017   • Cortical cataract of both eyes 05/03/2016   • Pre-diabetes 01/11/2016   • Vasomotor rhinitis 01/11/2016   • JIAN (obstructive sleep apnea) 10/28/2015   • Hypercholesterolemia 10/14/2015   • Rheumatoid arthritis with positive rheumatoid factor, involving unspecified site ([facility]) 02/02/2015   • History of breast cancer 12/07/2012       Past Medical History:   • Anesthesia complication    stopped breathing pacu   • Arthritis    RA   • Atherosclerosis of coronary artery   • Breast cancer (HCC)    Invasive ductal/lobular carcinoma  left(Invasive mammary 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)   • Cardiomyopathy ([facility])   • DVT (deep venous thrombosis) ([facility])    • Exposure to medical diagnostic radiation   • HYPERLIPIDEMIA   • Obesity   • OTHER DISEASES    bursitis   • PMR (polymyalgia rheumatica) (HCC)   • Pulmonary embolism (HCC)   • Rheumatoid arthritis (HCC)   • SLEEP APNEA    severe, , O2 ning 85%, AutoPAP 6-20 [facility]   • Sleep apnea       Past Surgical History:   Procedure Laterality Date   • Appendectomy  1960   • Appendectomy     • Cholecystectomy     • Colonoscopy  2006    colon polyps   • Colonoscopy,biopsy  08/29/2011    Performed by VIBHA LEUNG at [facility]   • Hip replacement surgery Right    • Hx breast cancer     • Knee replacement surgery Bilateral    • Lumpectomy left  12/15/2012    Invasive ductal/lobular carcinoma (Invasive mammary 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)   • Other surgical history  04/15/2014    Lt shoulder s/p reverse shoulder arthroplasty & open biceps tenodesis   • Radiation left  2012   • Remv cataract extracap,insert lens Right 07/27/2016    Procedure: RIGHT PHACOEMULSIFICATION OF CATARACT WITH INTRAOCULAR LENS IMPLANT [id number];  Surgeon: Kodak Salazar MD;  Location: [facility]   • Total hip replacement Bilateral    • Total knee replacement Bilateral        Medications Prior to Admission   Medication Sig Dispense Refill Last Dose   • gabapentin 100 MG Oral Cap Take 1 capsule (100 mg total) by mouth every morning.   4/19/2024 at 0500   • gabapentin 400 MG Oral Cap Take 1 capsule (400 mg total) by mouth nightly.   4/15/2024   • FOLIC ACID 1 MG Oral Tab TAKE 1 TABLET EVERY DAY 90 tablet 3 4/15/2024   • METOPROLOL SUCCINATE ER 25 MG Oral Tablet 24 Hr TAKE 1/2 TABLET EVERY DAY  (BETA  BLOCKER) (Patient taking differently: Take 0.5 tablets (12.5 mg total) by mouth daily.) 45 tablet 1 4/19/2024 at 0500   • PREDNISONE 5 MG Oral Tab TAKE 1 TABLET EVERY DAY 90 tablet 0 4/19/2024 at 0500   • rosuvastatin (CRESTOR) 10 MG Oral Tab Take 1 tablet (10 mg total) by mouth nightly.  (Patient taking differently: Take 0.5 tablets (5 mg total) by mouth nightly.) 90 tablet 3 4/15/2024   • methotrexate 2.5 MG Oral Tab Take 9 tablets (22.5 mg total) by mouth every 7 days. 10 tabs PO once a week (Patient taking differently: Take 9 tablets (22.5 mg total) by mouth every 7 days. 8 tabs PO once a week on Sunday) 130 tablet 1 4/7/2024   • apixaban 5 MG Oral Tab Take 1 tablet (5 mg total) by mouth 2 (two) times daily. (Patient taking differently: Take 1 tablet (5 mg total) by mouth 2 (two) times daily. Pt has instructions) 180 tablet 3 4/15/2024   • Vitamin B-12 1000 MCG Oral Tab Take 1 tablet (1,000 mcg total) by mouth.   4/15/2024   • ALENDRONATE 70 MG Oral Tab TAKE 1 TABLET ONE TIME WEEKLY 12 tablet 3 4/3/2024   • EXTRA STRENGTH ACETAMINOPHEN OR Take by mouth.     4/19/2024 at 0500   • ergocalciferol 76195 units Oral Cap Take 1 capsule (50,000 Units total) by mouth once a week. 12 capsule 1 4/15/2024   • Aspirin 81 MG Oral Tab Take 1 tablet (81 mg total) by mouth daily. Patient has instructions   4/15/2024   • Cranberry Extract 250 MG Oral Tab Take  by mouth 2 (two) times daily.   4/15/2024     Current Facility-Administered Medications Ordered in Epic   Medication Dose Route Frequency Provider Last Rate Last Admin   • lactated ringers infusion   Intravenous Continuous Lashawn March MD 20 mL/hr at 04/19/24 0640 New Bag at 04/19/24 0640   • acetaminophen (Tylenol Extra Strength) tab 1,000 mg  1,000 mg Oral Once Lashawn March MD       • metoprolol tartrate (Lopressor) tab 25 mg  25 mg Oral Once PRN Lashawn March MD       • ceFAZolin (Ancef) 2 g in 20mL IV syringe premix  2 g Intravenous Once Lashawn March MD         Current Outpatient Medications Ordered in Epic   Medication Sig Dispense Refill   • oxyCODONE 5 MG Oral Tab Take 1 tablet (5 mg total) by mouth every 4 (four) hours as needed for Pain. 20 tablet 0   • traMADol 50 MG Oral Tab Take 1 tablet (50 mg total) by mouth every 6 (six)  hours as needed for Pain. 20 tablet 0   • senna-docusate 8.6-50 MG Oral Tab Take 2 tablets by mouth every morning. 60 tablet 0   • ondansetron (ZOFRAN) 4 mg tablet Take 1 tablet (4 mg total) by mouth every 8 (eight) hours as needed for Nausea. 20 tablet 0       Allergies   Allergen Reactions   • Diflucan [Fluconazole] ANAPHYLAXIS   • Erythromycin Base NAUSEA AND VOMITING   • Pcn [Penicillins] RASH     No skin blistering no organ involvement       Family History   Problem Relation Age of Onset   • Cancer Mother 70        colon cancer   • Clotting Disorder Mother    • Other (Other) Mother         unknown type of arthritis   • Cancer Maternal Grandfather 70        colon cancer   • Other (Other) Sister         colon polyps   • Other (rheumatoid arthritis) Sister    • Heart Disorder Father    • Other (Other) Son         rheumatoid arthritis   • Clotting Disorder Brother         DVT/PE in youngest brother   • Heart Disorder Brother    • Obesity Brother    • Diabetes Brother      Social History     Socioeconomic History   • Marital status:    Tobacco Use   • Smoking status: Former     Current packs/day: 0.00     Average packs/day: 3.0 packs/day for 25.0 years (75.0 ttl pk-yrs)     Types: Cigarettes     Start date: 9/3/1955     Quit date: 9/3/1980     Years since quittin.6   • Smokeless tobacco: Never   Vaping Use   • Vaping status: Never Used   Substance and Sexual Activity   • Alcohol use: No     Alcohol/week: 0.0 standard drinks of alcohol   • Drug use: No   • Sexual activity: Never       Available pre-op labs reviewed.             Vital Signs:  Body mass index is 37.56 kg/m².   height is 1.727 m (5' 8\") and weight is 112 kg (247 lb). Her oral temperature is 97.5 °F (36.4 °C). Her blood pressure is 134/81 and her pulse is 70. Her respiration is 11 and oxygen saturation is 95%.   Vitals:    24 0833 24 0621 24 0648   BP:  107/51 134/81   Pulse:  73 70   Resp:  18 11   Temp:  97.5 °F (36.4 °C)     TempSrc:  Oral    SpO2:  94% 95%   Weight: 108.9 kg (240 lb) 112 kg (247 lb)    Height: 1.727 m (5' 8\") 1.727 m (5' 8\")         Anesthesia Evaluation     Patient summary reviewed and Nursing notes reviewed    Airway   Mallampati: I  Dental      Pulmonary - negative ROS and normal exam   Cardiovascular - normal exam  Exercise tolerance: good    NYHA Classification: I    Neuro/Psych - negative ROS     GI/Hepatic/Renal - negative ROS     Endo/Other - negative ROS   Abdominal                Anesthesia Plan:   ASA:  3  Plan:   General  Airway:  ETT  Post-op Pain Management: Interscalene block  Plan Comments: Patient took morning Prednisone this am      I have informed Mary Petersen and/or legal guardian or family member of the nature of the anesthetic plan, benefits, risks including possible dental damage if relevant, major complications, and any alternative forms of anesthetic management.   All of the patient's questions were answered to the best of my ability. The patient desires the anesthetic management as planned.  MARIA DEL ROSARIO VEE MD  4/19/2024 6:50 AM  Present on Admission:  **None**

## 2024-04-19 NOTE — PLAN OF CARE
Arrived into 427. AOx4. Glasses on. Right shoulder immobilizer sling in place, ice gel packs. Interscalene block at 0706. Up with assist to bathroom, voided. Unsteady slow gait (uses walker at baseline  and has a sore knee). DC plan is SNF. Tolerating general diet.     Problem: Patient Centered Care  Goal: Patient preferences are identified and integrated in the patient's plan of care  Description: Interventions:  - What would you like us to know as we care for you? I will need to go to SNF from here to recover  - Provide timely, complete, and accurate information to patient/family  - Incorporate patient and family knowledge, values, beliefs, and cultural backgrounds into the planning and delivery of care  - Encourage patient/family to participate in care and decision-making at the level they choose  - Honor patient and family perspectives and choices  Outcome: Progressing     Problem: Patient/Family Goals  Goal: Patient/Family Long Term Goal  Description: Patient's Long Term Goal: Return home when able    Interventions:  - SW and therapy and MDs for DC planning, see surgeon note  - See additional Care Plan goals for specific interventions  Outcome: Progressing  Goal: Patient/Family Short Term Goal  Description: Patient's Short Term Goal: Pain <5/10    Interventions:   - Ice gel packs in place, pillows and immobilizer for comfort.   - See additional Care Plan goals for specific interventions  Outcome: Progressing      Detail Level: Simple Hide Additional Notes?: No Additional Notes (Optional): Rash on shoulders 60-65% resolved and 50 % on lower back. Patient will return in 4 weeks and stop Clobetasol 4-5 days before appointment.

## 2024-04-19 NOTE — BRIEF OP NOTE
Pre-Operative Diagnosis: Chronic right shoulder pain, right rotator cuff tear arthropathy     Post-Operative Diagnosis: Chronic right shoulder pain, right rotator cuff tear arthropathy      Procedure Performed:   Right reverse total shoulder arthroplasty    Surgeons and Role:     * Lashawn March MD - Primary    Assistant(s):  PA: Asha Nieves PA-C     Surgical Findings: stable     Specimen: humeral head     Estimated Blood Loss: Blood Output: 200 mL (4/19/2024  8:53 AM)      Dictation Number:  #[id number]    Lashawn March MD  4/19/2024  9:37 AM

## 2024-04-19 NOTE — H&P
[facility] Hospitalist H&P       CC: No chief complaint on file.       PCP: Carole Dalal MD    History of Present Illness: Patient is a 83 year old female with PMH sig for DVT, afib on eliquis, RA, CAD,JIAN who presented to the hospital for RTSA.Patient was seen and examined post operatively in PACU. She was able to answer questions appropriately and all vitals stable.Was complaining of R shoulder discomfort.Otherwise no CP,SOB, nausea.     PMH  Past Medical History:    Anesthesia complication    stopped breathing pacu    Arthritis    RA    Atherosclerosis of coronary artery    Breast cancer (HCC)    Invasive ductal/lobular carcinoma  left(Invasive mammary 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)    Cardiomyopathy (HCC)    DVT (deep venous thrombosis) (HCC)    Exposure to medical diagnostic radiation    HYPERLIPIDEMIA    Obesity    OTHER DISEASES    bursitis    PMR (polymyalgia rheumatica) (HCC)    Pulmonary embolism (HCC)    Rheumatoid arthritis (HCC)    SLEEP APNEA    severe, , O2 ning 85%, AutoPAP 6-20 [facility]    Sleep apnea        H  Past Surgical History:   Procedure Laterality Date    Appendectomy  1960    Appendectomy      Cholecystectomy      Colonoscopy  2006    colon polyps    Colonoscopy,biopsy  08/29/2011    Performed by VIBHA LEUNG at [facility]    Hip replacement surgery Right     Hx breast cancer      Knee replacement surgery Bilateral     Lumpectomy left  12/15/2012    Invasive ductal/lobular carcinoma (Invasive mammary 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)    Other surgical history  04/15/2014    Lt shoulder s/p reverse shoulder arthroplasty & open biceps tenodesis    Radiation left  2012    Remv cataract extracap,insert lens Right 07/27/2016    Procedure: RIGHT PHACOEMULSIFICATION OF CATARACT WITH INTRAOCULAR LENS IMPLANT [id number];  Surgeon: Kodak Salazar MD;  Location: [facility]    Total hip replacement  Bilateral     Total knee replacement Bilateral         ALL:  Allergies   Allergen Reactions    Diflucan [Fluconazole] ANAPHYLAXIS    Erythromycin Base NAUSEA AND VOMITING    Pcn [Penicillins] RASH     No skin blistering no organ involvement        Home Medications:  Outpatient Medications Marked as Taking for the 4/19/24 encounter (Hospital Encounter)   Medication Sig Dispense Refill    oxyCODONE 5 MG Oral Tab Take 1 tablet (5 mg total) by mouth every 4 (four) hours as needed for Pain. 20 tablet 0    traMADol 50 MG Oral Tab Take 1 tablet (50 mg total) by mouth every 6 (six) hours as needed for Pain. 20 tablet 0    senna-docusate 8.6-50 MG Oral Tab Take 2 tablets by mouth every morning. 60 tablet 0    ondansetron (ZOFRAN) 4 mg tablet Take 1 tablet (4 mg total) by mouth every 8 (eight) hours as needed for Nausea. 20 tablet 0    gabapentin 100 MG Oral Cap Take 1 capsule (100 mg total) by mouth every morning.      gabapentin 400 MG Oral Cap Take 1 capsule (400 mg total) by mouth nightly.      FOLIC ACID 1 MG Oral Tab TAKE 1 TABLET EVERY DAY 90 tablet 3    METOPROLOL SUCCINATE ER 25 MG Oral Tablet 24 Hr TAKE 1/2 TABLET EVERY DAY  (BETA  BLOCKER) (Patient taking differently: Take 0.5 tablets (12.5 mg total) by mouth daily.) 45 tablet 1    PREDNISONE 5 MG Oral Tab TAKE 1 TABLET EVERY DAY 90 tablet 0    rosuvastatin (CRESTOR) 10 MG Oral Tab Take 1 tablet (10 mg total) by mouth nightly. (Patient taking differently: Take 0.5 tablets (5 mg total) by mouth nightly.) 90 tablet 3    methotrexate 2.5 MG Oral Tab Take 9 tablets (22.5 mg total) by mouth every 7 days. 10 tabs PO once a week (Patient taking differently: Take 9 tablets (22.5 mg total) by mouth every 7 days. 8 tabs PO once a week on Sunday) 130 tablet 1    apixaban 5 MG Oral Tab Take 1 tablet (5 mg total) by mouth 2 (two) times daily. (Patient taking differently: Take 1 tablet (5 mg total) by mouth 2 (two) times daily. Pt has instructions) 180 tablet 3    Vitamin B-12  1000 MCG Oral Tab Take 1 tablet (1,000 mcg total) by mouth.      ALENDRONATE 70 MG Oral Tab TAKE 1 TABLET ONE TIME WEEKLY 12 tablet 3    EXTRA STRENGTH ACETAMINOPHEN OR Take by mouth.        ergocalciferol 92632 units Oral Cap Take 1 capsule (50,000 Units total) by mouth once a week. 12 capsule 1    Aspirin 81 MG Oral Tab Take 1 tablet (81 mg total) by mouth daily. Patient has instructions      Cranberry Extract 250 MG Oral Tab Take  by mouth 2 (two) times daily.           Soc Hx  Social History     Tobacco Use    Smoking status: Former     Current packs/day: 0.00     Average packs/day: 3.0 packs/day for 25.0 years (75.0 ttl pk-yrs)     Types: Cigarettes     Start date: 9/3/1955     Quit date: 9/3/1980     Years since quittin.6    Smokeless tobacco: Never   Substance Use Topics    Alcohol use: No     Alcohol/week: 0.0 standard drinks of alcohol        Fam Hx  Family History   Problem Relation Age of Onset    Cancer Mother 70        colon cancer    Clotting Disorder Mother     Other (Other) Mother         unknown type of arthritis    Cancer Maternal Grandfather 70        colon cancer    Other (Other) Sister         colon polyps    Other (rheumatoid arthritis) Sister     Heart Disorder Father     Other (Other) Son         rheumatoid arthritis    Clotting Disorder Brother         DVT/PE in youngest brother    Heart Disorder Brother     Obesity Brother     Diabetes Brother        Review of Systems  Comprehensive ROS reviewed and negative except for what's stated above.     OBJECTIVE:  /55   Pulse 69   Temp 97.4 °F (36.3 °C)   Resp 10   Ht 5' 8\" (1.727 m)   Wt 247 lb (112 kg)   SpO2 95%   BMI 37.56 kg/m²   General:  Alert                   Neck: Supple   Lungs:   Clear to auscultation bilaterally.        Heart:  Regular rate and rhythm, S1, S2 normal,    Abdomen:   Soft, non-tender. Bowel sounds normal.   Extremities: R postop shoulder             Diagnostic Data:    CBC/Chem  No results for input(s):  \"WBC\", \"HGB\", \"MCV\", \"PLT\", \"BAND\", \"INR\" in the last 168 hours.    Invalid input(s): \"LYM#\", \"MONO#\", \"BASOS#\", \"EOSIN#\"    No results for input(s): \"NA\", \"K\", \"CL\", \"CO2\", \"BUN\", \"CREATSERUM\", \"GLU\", \"CA\", \"CAION\", \"MG\", \"PHOS\" in the last 168 hours.    No results for input(s): \"ALT\", \"AST\", \"ALB\", \"AMYLASE\", \"LIPASE\", \"LDH\" in the last 168 hours.    Invalid input(s): \"ALPHOS\", \"TBIL\", \"DBIL\", \"TPROT\"    No results for input(s): \"TROP\" in the last 168 hours.    Radiology: XR SHOULDER, COMPLETE (MIN 2 VIEWS), RIGHT (CPT=73030)    Result Date: 4/19/2024  CONCLUSION: Status post total reverse right shoulder arthroplasty.    Dictated by (CST): Blane Haro MD on 4/19/2024 at 11:10 AM     Finalized by (CST): Blane Haro MD on 4/19/2024 at 11:11 AM             ASSESSMENT / PLAN:   Patient is a 83 year old female with PMH sig for DVT, afib on eliquis, RA, CAD,JIAN who presented to the hospital for RTSA.    R rotator cuff tear arthropathy  - s/p RTSA POD # 0,4/19  - prn pain medication   - monitor respiratory status  - encouraged IS use  - prn anti emetics  - CBC in the morning  - OT/SW, per patient plan is for SNF after surgery  - dvt ppx: eliquis  - rest of management per ortho    pAF  Secondary hypercoagulable state  - resume eliquis and metoprolol    DVT  - eliquis    RA  - holding MTX,resume in 1 week  - prednisone resumed    CAD  - resume ASA when okay with ortho  - resume statin    JIAN  - CPAP    FN:  - IVF: in pacu  - Diet: NPO    DVT Prophy: eliquis  Lines: PIV    Dispo: pending clinical course    Outpatient records or previous hospital records reviewed.     Further recommendations pending patient's clinical course.  [facility] hospitalist to continue to follow patient while in house    Patient and/or patient's family given opportunity to ask questions and note understanding and agreeing with therapeutic plan as outlined    Thank You,  Jennifre Bullock,     Hospitalist with [facility]  Answering  Service number: [phone]

## 2024-04-20 LAB
ANION GAP SERPL CALC-SCNC: 7 MMOL/L (ref 0–18)
BUN BLD-MCNC: 19 MG/DL (ref 9–23)
BUN/CREAT SERPL: 25.3 (ref 10–20)
CALCIUM BLD-MCNC: 9.4 MG/DL (ref 8.7–10.4)
CHLORIDE SERPL-SCNC: 109 MMOL/L (ref 98–112)
CO2 SERPL-SCNC: 25 MMOL/L (ref 21–32)
CREAT BLD-MCNC: 0.75 MG/DL
DEPRECATED RDW RBC AUTO: 46.5 FL (ref 35.1–46.3)
EGFRCR SERPLBLD CKD-EPI 2021: 79 ML/MIN/1.73M2 (ref 60–?)
ERYTHROCYTE [DISTWIDTH] IN BLOOD BY AUTOMATED COUNT: 12.9 % (ref 11–15)
GLUCOSE BLD-MCNC: 111 MG/DL (ref 70–99)
HCT VFR BLD AUTO: 31.3 %
HGB BLD-MCNC: 10.5 G/DL
MCH RBC QN AUTO: 33 PG (ref 26–34)
MCHC RBC AUTO-ENTMCNC: 33.5 G/DL (ref 31–37)
MCV RBC AUTO: 98.4 FL
OSMOLALITY SERPL CALC.SUM OF ELEC: 295 MOSM/KG (ref 275–295)
PLATELET # BLD AUTO: 222 10(3)UL (ref 150–450)
POTASSIUM SERPL-SCNC: 4.3 MMOL/L (ref 3.5–5.1)
RBC # BLD AUTO: 3.18 X10(6)UL
SODIUM SERPL-SCNC: 141 MMOL/L (ref 136–145)
WBC # BLD AUTO: 11.2 X10(3) UL (ref 4–11)

## 2024-04-20 PROCEDURE — 97116 GAIT TRAINING THERAPY: CPT

## 2024-04-20 PROCEDURE — 85027 COMPLETE CBC AUTOMATED: CPT | Performed by: STUDENT IN AN ORGANIZED HEALTH CARE EDUCATION/TRAINING PROGRAM

## 2024-04-20 PROCEDURE — 97530 THERAPEUTIC ACTIVITIES: CPT

## 2024-04-20 PROCEDURE — 85018 HEMOGLOBIN: CPT | Performed by: ORTHOPAEDIC SURGERY

## 2024-04-20 PROCEDURE — 80048 BASIC METABOLIC PNL TOTAL CA: CPT | Performed by: ORTHOPAEDIC SURGERY

## 2024-04-20 PROCEDURE — 97165 OT EVAL LOW COMPLEX 30 MIN: CPT

## 2024-04-20 PROCEDURE — 85014 HEMATOCRIT: CPT | Performed by: ORTHOPAEDIC SURGERY

## 2024-04-20 PROCEDURE — 97161 PT EVAL LOW COMPLEX 20 MIN: CPT

## 2024-04-20 NOTE — PROGRESS NOTES
[facility] Hospitalist Progress Note     CC: Hospital Follow up    PCP: Carole Dalal MD       Assessment/Plan:     Active Problems:    Aftercare following right shoulder joint replacement surgery    Pre-op testing    Patient is a 83 year old female with PMH sig for DVT, afib on eliquis, RA, CAD,JIAN who presented to the hospital for RTSA.     R rotator cuff tear arthropathy  - s/p RTSA POD # 1,4/19  - prn pain medication   - monitor respiratory status  - encouraged IS use  - prn anti emetics  - CBC in the morning, post op hgb of 10.5  - OT/SW, per patient plan is for SNF after surgery  - dvt ppx: eliquis  - rest of management per ortho     pAF  Secondary hypercoagulable state  - resume eliquis and metoprolol     DVT  - eliquis     RA  - holding MTX,resume in 1 week  - prednisone resumed     CAD  - resume ASA when okay with ortho  - resume statin     JIAN  - CPAP     FN:  - IVF: none  - Diet: regular     DVT Prophy: eliquis  Lines: PIV    Dispo: pending clinical course, plan for SNF on dc    Thank You,  Jennifer Bullock DO    Hospitalist with [facility]     Subjective:     Patient seen and examined. Feels well. Pain is tolerable. No other complaints    OBJECTIVE:    Blood pressure 107/58, pulse 71, temperature 98.4 °F (36.9 °C), temperature source Temporal, resp. rate 18, height 5' 8\" (1.727 m), weight 247 lb (112 kg), SpO2 92%.    Temp:  [97.4 °F (36.3 °C)-98.4 °F (36.9 °C)] 98.4 °F (36.9 °C)  Pulse:  [61-75] 71  Resp:  [10-20] 18  BP: ()/(55-70) 107/58  SpO2:  [92 %-98 %] 92 %      Intake/Output:    Intake/Output Summary (Last 24 hours) at 4/20/2024 1059  Last data filed at 4/20/2024 0318  Gross per 24 hour   Intake 1527.5 ml   Output 1450 ml   Net 77.5 ml       Last 3 Weights   04/19/24 0621 247 lb (112 kg)   04/16/24 0833 240 lb (108.9 kg)   02/28/24 0754 250 lb (113.4 kg)   02/23/24 1151 243 lb (110.2 kg)   04/29/23 1418 245 lb (111.1 kg)       Exam   Gen: No acute  distress  Heent: NC AT, neck supple  Pulm: Lungs clear, normal respiratory effort  CV: Heart with regular rate and rhythm, no peripheral edema  Abd: Abdomen soft, nontender, nondistended  MSK: R post op shoulder  Skin: no rashes or lesions        Data Review:       Labs:     Recent Labs   Lab 04/20/24  0524   RBC 3.18*   HGB 10.5*   HCT 31.3*   MCV 98.4   MCH 33.0   MCHC 33.5   RDW 12.9   WBC 11.2*   .0         Recent Labs   Lab 04/20/24  0524   *   BUN 19   CREATSERUM 0.75   EGFRCR 79   CA 9.4      K 4.3      CO2 25.0       No results for input(s): \"ALT\", \"AST\", \"ALB\", \"AMYLASE\", \"LIPASE\", \"LDH\" in the last 168 hours.    Invalid input(s): \"ALPHOS\", \"TBIL\", \"DBIL\", \"TPROT\"      Imaging:  XR SHOULDER, COMPLETE (MIN 2 VIEWS), RIGHT (CPT=73030)    Result Date: 4/19/2024  CONCLUSION: Status post total reverse right shoulder arthroplasty.    Dictated by (CST): Blane Haro MD on 4/19/2024 at 11:10 AM     Finalized by (CST): Blane Haro MD on 4/19/2024 at 11:11 AM             Meds:      sennosides  17.2 mg Oral Nightly    docusate sodium  100 mg Oral BID    apixaban  5 mg Oral BID@0600,1800    acetaminophen  650 mg Oral Q4H While awake    folic acid  1 mg Oral Daily    gabapentin  100 mg Oral QAM    gabapentin  400 mg Oral Nightly    metoprolol succinate ER  12.5 mg Oral Daily    predniSONE  5 mg Oral Daily    rosuvastatin  5 mg Oral Nightly      lactated ringers Stopped (04/19/24 2330)       sodium chloride    polyethylene glycol (PEG 3350)    magnesium hydroxide    bisacodyl    fleet enema    ondansetron    metoclopramide    oxyCODONE **OR** oxyCODONE    HYDROmorphone **OR** HYDROmorphone

## 2024-04-20 NOTE — PHYSICAL THERAPY NOTE
PHYSICAL THERAPY EVALUATION - INPATIENT     Room Number: 427/427-A  Evaluation Date: 4/20/2024  Type of Evaluation: Initial   Physician Order: PT Eval and Treat    Presenting Problem: R TSA     Reason for Therapy: Mobility Dysfunction and Discharge Planning    PHYSICAL THERAPY ASSESSMENT   Patient is a 83 year old female admitted 4/19/2024 for R TSA.  Prior to admission, patient's baseline is mod indep with mobility with RW, performs ADLs. Pt lives alone.  Patient is currently functioning below baseline with bed mobility, transfers, gait, stair negotiation, and performing household tasks.  Patient is requiring contact guard assist as a result of the following impairments: decreased endurance/aerobic capacity, impaired standing balance, and NWB RUE .  Physical Therapy will continue to follow for duration of hospitalization.    Patient will benefit from continued skilled PT Services to promote return to prior level of function and safety with continuous assistance and gradual rehabilitative therapy .    PLAN  PT Treatment Plan: Bed mobility;Don/doff brace;Energy conservation;Gait training;Strengthening;Transfer training;Balance training  Rehab Potential : Good  Frequency (Obs): 5x/week    PHYSICAL THERAPY MEDICAL/SOCIAL HISTORY       Problem List  Active Problems:    Aftercare following right shoulder joint replacement surgery    Pre-op testing      HOME SITUATION  Home Situation  Type of Home: House  Home Layout: One level  Stairs to Enter : 4  Railing: Yes  Stairs to Bedroom: 0  Lives With: Alone  Patient Owned Equipment: Rolling walker;Cane     Prior Level of [place]: amb with RW    SUBJECTIVE  It doesn't really hurt when I'm not moving    PHYSICAL THERAPY EXAMINATION   OBJECTIVE  Precautions: Limb alert - right;Bed/chair alarm  Fall Risk: Standard fall risk    WEIGHT BEARING RESTRICTION  Weight Bearing Restriction: R upper extremity  R Upper Extremity: Non-Weight Bearing             PAIN ASSESSMENT  Rating:  2  Location: R shoulder  Management Techniques: Activity promotion;Repositioning (sling position adjusted)    COGNITION  Overall Cognitive Status:  WFL - within functional limits    RANGE OF MOTION AND STRENGTH ASSESSMENT  Upper extremity ROM and strength are within functional limits on LUE, RUE in sling and NT  Lower extremity ROM is within functional limits   Lower extremity strength is within functional limits for pt    BALANCE  Static Sitting: Fair +  Dynamic Sitting: Fair -  Static Standing: Fair -  Dynamic Standing: Fair -    ADDITIONAL TESTS                                    NEUROLOGICAL FINDINGS                      ACTIVITY TOLERANCE                         O2 WALK       AM-PAC '6-Clicks' INPATIENT SHORT FORM - BASIC MOBILITY  How much difficulty does the patient currently have...  Patient Difficulty: Turning over in bed (including adjusting bedclothes, sheets and blankets)?: A Little   Patient Difficulty: Sitting down on and standing up from a chair with arms (e.g., wheelchair, bedside commode, etc.): A Little   Patient Difficulty: Moving from lying on back to sitting on the side of the bed?: A Little (with HOB elevated)   How much help from another person does the patient currently need...   Help from Another: Moving to and from a bed to a chair (including a wheelchair)?: A Little   Help from Another: Need to walk in hospital room?: A Little   Help from Another: Climbing 3-5 steps with a railing?: A Lot     AM-PAC Score:  Raw Score: 17   Approx Degree of Impairment: 50.57%   Standardized Score (AM-PAC Scale): 42.13   CMS Modifier (G-Code): CK    FUNCTIONAL ABILITY STATUS  Functional Mobility/Gait Assessment  Gait Assistance: Contact guard assist (chair follow)  Distance (ft): 2x40  Assistive Device:  (hemiwalker)  Pattern: Shuffle (dec braxton, dec step length bilat)  Rolling: modified independent  Supine to Sit: supervision with HOB elevated  Sit to Supine:  NT  Sit to Stand: contact guard  assist    Exercise/Education Provided:  Bed mobility  Gait training  Transfer training  PT eval    Pt ok to be seen for PT evaluation. Pt willing to participate. Pt educ on role of PT and POC. Pt endorsed understanding of sling wearing and NWB RUE. Pt maintained NWB RUE throughout session. Pt with inc effort for bed mobility and performed with HOB elevated to increase ease of task. Pt educ in safety with use of hemiwalker as pt stated that a cane wasn't enough support. Pt demo'd safety with hemiwalker, amb 2x40 feet with chair follow and standing rest breaks. Pt functioning below baseline and is not mod indep with mobility and ADL tasks for safe d/c home alone.    The patient's Approx Degree of Impairment: 50.57% has been calculated based on documentation in the [facility] '6 clicks' Inpatient Basic Mobility Short Form.  Research supports that patients with this level of impairment may benefit from gradual rehab therapy.  Final disposition will be made by interdisciplinary medical team.    Patient End of Session: Up in chair;Needs met;Call light within reach;RN aware of session/findings;All patient questions and concerns addressed    CURRENT GOALS  Goals to be met by: 4/27/24  Patient Goal Patient's self-stated goal is: go to rehab   Goal #1 Patient is able to demonstrate supine - sit EOB @ level: supervision     Goal #1   Current Status    Goal #2 Patient is able to demonstrate transfers Sit to/from Stand at assistance level: supervision with walker - ivette     Goal #2  Current Status    Goal #3 Patient is able to ambulate 80 feet with assist device: walker - ivette at assistance level: supervision   Goal #3   Current Status    Goal #4    Goal #4   Current Status    Goal #5 Patient to demonstrate independence with home activity/exercise instructions provided to patient in preparation for discharge.   Goal #5   Current Status    Goal #6    Goal #6  Current Status      Patient Evaluation Complexity Level:  History Moderate -  1 or 2 personal factors and/or co-morbidities   Examination of body systems Low -  addressing 1-2 elements   Clinical Presentation Low- Stable   Clinical Decision Making  Low Complexity     Gait Training: 15 minutes

## 2024-04-20 NOTE — CM/SW NOTE
Anticipated therapy need: Gradual Rehabilitative Therapy     CM requested department  (DSC) to initiate [facility] referral for [facility].  DEMIAN done  Facility to initiate auth.      SW/CM will continue to follow.     Sarika Smith RN, BSN  Nurse   [phone]

## 2024-04-20 NOTE — OCCUPATIONAL THERAPY NOTE
OCCUPATIONAL THERAPY EVALUATION - INPATIENT     Room Number: 427/427-A  Evaluation Date: 2024  Type of Evaluation: Initial  Presenting Problem: R TSA- ok for wrist, hand, elbow; NWB    Physician Order: IP Consult to Occupational Therapy  Reason for Therapy: ADL/IADL Dysfunction and Discharge Planning    OCCUPATIONAL THERAPY ASSESSMENT   Patient is a 83 year old female admitted 2024 for R TSA; NWB; ok for wrist hand elbow.  Prior to admission, patient's baseline is independent.  Patient is currently functioning below baseline with self care and functional mobility.  Patient is requiring up to Mod A  as a result of the following impairments: limited bimanual hand use, balance, activity tolerance, mobility. Occupational Therapy will continue to follow for duration of hospitalization.    Patient will benefit from continued skilled OT Services to promote return to prior level of function and safety with continuous assistance and gradual rehabilitative therapy     PLAN  OT Treatment Plan: Balance activities;Energy conservation/work simplification techniques;ADL training;Functional transfer training;Endurance training;Patient/Family education;Patient/Family training;Compensatory technique education  OT Device Recommendations: TBD    OCCUPATIONAL THERAPY MEDICAL/SOCIAL HISTORY   Problem List   Active Problems:    Aftercare following right shoulder joint replacement surgery    Pre-op testing    HOME SITUATION  Type of Home: House  Home Layout: One level  Lives With: Alone    SUBJECTIVE  I want to go to rehab     OCCUPATIONAL THERAPY EXAMINATION   OBJECTIVE  Precautions: Limb alert - right  Fall Risk: Standard fall risk    WEIGHT BEARING RESTRICTION  R Upper Extremity: Non-Weight Bearing    PAIN ASSESSMENT  Ratin    RANGE OF MOTION   Upper extremity ROM is within functional limits for LUE    ACTIVITIES OF DAILY LIVING ASSESSMENT  AM-PAC ‘6-Clicks’ Inpatient Daily Activity Short Form  How much help from another  person does the patient currently need…  -   Putting on and taking off regular lower body clothing?: A Lot  -   Bathing (including washing, rinsing, drying)?: A Lot  -   Toileting, which includes using toilet, bedpan or urinal? : A Lot  -   Putting on and taking off regular upper body clothing?: A Lot  -   Taking care of personal grooming such as brushing teeth?: A Little  -   Eating meals?: A Little    AM-PAC Score:  Score: 14  Approx Degree of Impairment: 59.67%  Standardized Score (AM-PAC Scale): 33.39  CMS Modifier (G-Code): CK    FUNCTIONAL TRANSFER ASSESSMENT  Sit to Stand: Edge of Bed  Edge of Bed: Minimal Assist    BED MOBILITY  Rolling: Minimal Assist  Supine to Sit : Minimal Assist  Scooting: Min A    BALANCE ASSESSMENT  Static Standing: Minimal Assist    FUNCTIONAL ADL ASSESSMENT  Eating: Stand-by Assist  Grooming Seated: Minimal Assist  Bathing Seated: Minimal Assist  UB Dressing Seated: Moderate Assist  LB Dressing Seated: Moderate Assist  Toileting Seated: Minimal Assist    EDUCATION PROVIDED  Patient: Role of Occupational Therapy; Plan of Care; Discharge Recommendations; Functional Transfer Techniques; Surgical Precautions; Posture/Positioning; Compensatory ADL Techniques  Patient's Response to Education: Verbalized Understanding    The patient's Approx Degree of Impairment: 59.67% has been calculated based on documentation in the [facility] '6 clicks' Inpatient Daily Activity Short Form.  Research supports that patients with this level of impairment may benefit from GR.  Final disposition will be made by interdisciplinary medical team.    Patient End of Session: Up in chair      PPE worn: mask,gloves,goggles; patient also wearing a mask during this treatment session.     RN provided consent to proceed with treatment. Per ordered protocol, therapist instructed patient on the following exercises to be completed at home; educated on prescribed frequency per orthopedic surgeon  ROM of Elbow  Wrist AROM  Hand  ROM    Demonstrated optimal positioning at rest and use of pillows to provide additional support for surgical arm while in bed and up in chair.   Demonstrated and patient return demo'd bed mobility while maintaining NWB precautions.     Patient verbalized understanding of all this education and indicated no further concerns     Recommend initial 24 hour SPV and assist as needed; was able to complete functional mobility in room with ivette walker with Min A    All patient's questions answered at end of session and no further needs verbalized.           OT Goals  Patient self-stated goal is: to go to rehab      Patient will complete LE dressing with SBA   Comment:     Patient will complete toilet transfer with SBA   Comment:     Patient will complete self care task at sink level with SBA    Comment:     Comment:         Goals  on:   Frequency:3x week     Patient Evaluation Complexity Level:   Occupational Profile/Medical History MODERATE - Expanded review of history including review of medical or therapy record   Specific performance deficits impacting engagement in ADL/IADL MODERATE  3 - 5 performance deficits   Client Assessment/Performance Deficits MODERATE - Comorbidities and min to mod modifications of tasks    Clinical Decision Making MODERATE - Analysis of occupational profile, detailed assessments, several treatment options    Overall Complexity MODERATE     OT Session Time: 18 minutes  Self-Care Home Management: 0 minutes  Therapeutic Activity: 18 minutes

## 2024-04-20 NOTE — PLAN OF CARE
Problem: Patient Centered Care  Goal: Patient preferences are identified and integrated in the patient's plan of care  Description: Interventions:  - What would you like us to know as we care for you?   - Provide timely, complete, and accurate information to patient/family  - Incorporate patient and family knowledge, values, beliefs, and cultural backgrounds into the planning and delivery of care  - Encourage patient/family to participate in care and decision-making at the level they choose  - Honor patient and family perspectives and choices  Outcome: Progressing     Problem: Patient/Family Goals  Goal: Patient/Family Long Term Goal  Description: Patient's Long Term Goal:     Interventions:  - Pain management, OT and PT.  - See additional Care Plan goals for specific interventions  Outcome: Progressing  Goal: Patient/Family Short Term Goal  Description: Patient's Short Term Goal:     Interventions:   - Pain management, OT and PT.  - See additional Care Plan goals for specific interventions  Outcome: Progressing   Pt is POD 1 and is  A&Ox4. Pt is able to ambulate to the restroom with 1 person assist. She is tolerating her general diet, and is belching. Her pain is controlled with Tylenol, oxycodone and ice therapy. Her plan is to be discharged to rehab upon insurance approval and medical discharge.

## 2024-04-20 NOTE — PROGRESS NOTES
[facility]  part of [facility]    Progress Note    Mary Petersen Patient Status:  Outpatient in a Bed    1940 MRN [id number]   Location [facility] 4W/SW/SE Attending Lashawn March MD   Hosp Day # 0 PCP Carole Dalal MD     Chief Complaint: s/p right reverse TSR     Subjective:     Constitutional:         Pt states her shoulder is sore today, but she feels ok  She lives alone and uses a walker; the recommendation would be for SNF for this pt       Objective:   Physical Exam  Vitals reviewed.       Right Shoulder Exam     Comments:  Pt in NAD.  VSS.  NV status B UE intact.  The pt can fully flex and extend the fingers.  Grasp and pinch is 5/5.  Wrist ROM full and pain free.  Dressing dry and intact.             Results:   Lab Results   Component Value Date    WBC 11.2 (H) 2024    HGB 10.5 (L) 2024    HCT 31.3 (L) 2024    .0 2024    CREATSERUM 0.75 2024    BUN 19 2024     2024    K 4.3 2024     2024    CO2 25.0 2024     (H) 2024    CA 9.4 2024    ALB 4.0 2022    ALKPHO 60 2022    BILT 0.63 2022    TP 6.4 2022    AST 30 2022    ALT 26 2022    PTT 23.6 2018    INR 1.0 2018    TSH 1.470 2022    DDIMER 0.41 2015    ESRML 26 (H) 2022    CRP 0.43 2022    MG 2.5 2023    TROP 3.06 (HH) 2018    CK 55 2023    B12 297 2021       XR SHOULDER, COMPLETE (MIN 2 VIEWS), RIGHT (CPT=73030)    Result Date: 2024  CONCLUSION: Status post total reverse right shoulder arthroplasty.    Dictated by (CST): Blane Haro MD on 2024 at 11:10 AM     Finalized by (CST): Blane Haro MD on 2024 at 11:11 AM               Assessment & Plan:     Aftercare following right shoulder joint replacement surgery  S/p right reverse TSR      Pre-op testing   Cont PT/OT  Dressing change  henrietta  Discharge planning.        Asha Nieves PA-C  4/20/2024

## 2024-04-20 NOTE — CM/SW NOTE
Department  notified of request for mike [facility] referrals started. Assigned CM/SW to follow up with pt/family on further discharge planning.     Татьяна Dupont, DSC

## 2024-04-20 NOTE — PLAN OF CARE
Patient is A&Ox4. VSS. RA. Right shoulder immobilizer in place along with sling. Up x1 assist with hemiwalker. Patient walked out to the [place] with PT and OT. Scheduled tylenol administered for pain control. Patient is continent. Call light and personal belongings are within reach. Tolertaing diet. Passing gas. Plan is for rehab on discharge. She is from home alone.  Problem: Patient Centered Care  Goal: Patient preferences are identified and integrated in the patient's plan of care  Description: Interventions:  - What would you like us to know as we care for you?   - Provide timely, complete, and accurate information to patient/family  - Incorporate patient and family knowledge, values, beliefs, and cultural backgrounds into the planning and delivery of care  - Encourage patient/family to participate in care and decision-making at the level they choose  - Honor patient and family perspectives and choices  Outcome: Progressing     Problem: Patient/Family Goals  Goal: Patient/Family Long Term Goal  Description: Patient's Long Term Goal:     Interventions:  - See additional Care Plan goals for specific interventions  Outcome: Progressing  Goal: Patient/Family Short Term Goal  Description: Patient's Short Term Goal:    Interventions:   - See additional Care Plan goals for specific interventions  Outcome: Progressing     Problem: PAIN - ADULT  Goal: Verbalizes/displays adequate comfort level or patient's stated pain goal  Description: INTERVENTIONS:  - Encourage pt to monitor pain and request assistance  - Assess pain using appropriate pain scale  - Administer analgesics based on type and severity of pain and evaluate response  - Implement non-pharmacological measures as appropriate and evaluate response  - Consider cultural and social influences on pain and pain management  - Manage/alleviate anxiety  - Utilize distraction and/or relaxation techniques  - Monitor for opioid side effects  - Notify MD/LIP if interventions  unsuccessful or patient reports new pain  - Anticipate increased pain with activity and pre-medicate as appropriate  Outcome: Progressing     Problem: RISK FOR INFECTION - ADULT  Goal: Absence of fever/infection during anticipated neutropenic period  Description: INTERVENTIONS  - Monitor WBC  - Administer growth factors as ordered  - Implement neutropenic guidelines  Outcome: Progressing     Problem: SAFETY ADULT - FALL  Goal: Free from fall injury  Description: INTERVENTIONS:  - Assess pt frequently for physical needs  - Identify cognitive and physical deficits and behaviors that affect risk of falls.  - [place] fall precautions as indicated by assessment.  - Educate pt/family on patient safety including physical limitations  - Instruct pt to call for assistance with activity based on assessment  - Modify environment to reduce risk of injury  - Provide assistive devices as appropriate  - Consider OT/PT consult to assist with strengthening/mobility  - Encourage toileting schedule  Outcome: Progressing     Problem: DISCHARGE PLANNING  Goal: Discharge to home or other facility with appropriate resources  Description: INTERVENTIONS:  - Identify barriers to discharge w/pt and caregiver  - Include patient/family/discharge partner in discharge planning  - Arrange for needed discharge resources and transportation as appropriate  - Identify discharge learning needs (meds, wound care, etc)  - Arrange for interpreters to assist at discharge as needed  - Consider post-discharge preferences of patient/family/discharge partner  - Complete POLST form as appropriate  - Assess patient's ability to be responsible for managing their own health  - Refer to Case Management Department for coordinating discharge planning if the patient needs post-hospital services based on physician/LIP order or complex needs related to functional status, cognitive ability or social support system  Outcome: Progressing     Problem: Altered  Communication/Language Barrier  Goal: Patient/Family is able to understand and participate in their care  Description: Interventions:  - Assess communication ability and preferred communication style  - Implement communication aides and strategies  - Use visual cues when possible  - Listen attentively, be patient, do not interrupt  - Minimize distractions  - Allow time for understanding and response  - Establish method for patient to ask for assistance (call light)  - Provide an  as needed  - Communicate barriers and strategies to overcome with those who interact with patient  Outcome: Progressing

## 2024-04-21 RX ORDER — SENNA AND DOCUSATE SODIUM 50; 8.6 MG/1; MG/1
2 TABLET, FILM COATED ORAL EVERY MORNING
Qty: 60 TABLET | Refills: 0 | Status: SHIPPED | OUTPATIENT
Start: 2024-04-21 | End: 2024-04-21

## 2024-04-21 RX ORDER — TRAMADOL HYDROCHLORIDE 50 MG/1
50 TABLET ORAL EVERY 6 HOURS PRN
Qty: 20 TABLET | Refills: 0 | Status: SHIPPED | OUTPATIENT
Start: 2024-04-21 | End: 2024-04-21

## 2024-04-21 RX ORDER — OXYCODONE HYDROCHLORIDE 5 MG/1
5 TABLET ORAL EVERY 4 HOURS PRN
Qty: 20 TABLET | Refills: 0 | Status: SHIPPED | OUTPATIENT
Start: 2024-04-21 | End: 2024-04-21

## 2024-04-21 RX ORDER — ONDANSETRON 4 MG/1
4 TABLET, FILM COATED ORAL EVERY 8 HOURS PRN
Qty: 20 TABLET | Refills: 0 | Status: SHIPPED | OUTPATIENT
Start: 2024-04-21

## 2024-04-21 RX ORDER — ONDANSETRON 4 MG/1
4 TABLET, FILM COATED ORAL EVERY 8 HOURS PRN
Qty: 20 TABLET | Refills: 0 | Status: SHIPPED | OUTPATIENT
Start: 2024-04-21 | End: 2024-04-21

## 2024-04-21 RX ORDER — TRAMADOL HYDROCHLORIDE 50 MG/1
50 TABLET ORAL EVERY 6 HOURS PRN
Qty: 20 TABLET | Refills: 0 | Status: SHIPPED | OUTPATIENT
Start: 2024-04-21

## 2024-04-21 RX ORDER — SENNA AND DOCUSATE SODIUM 50; 8.6 MG/1; MG/1
2 TABLET, FILM COATED ORAL EVERY MORNING
Qty: 60 TABLET | Refills: 0 | Status: SHIPPED | OUTPATIENT
Start: 2024-04-21

## 2024-04-21 RX ORDER — OXYCODONE HYDROCHLORIDE 5 MG/1
5 TABLET ORAL EVERY 4 HOURS PRN
Qty: 20 TABLET | Refills: 0 | Status: SHIPPED | OUTPATIENT
Start: 2024-04-21

## 2024-04-21 NOTE — PROGRESS NOTES
[facility]  part of [facility]    Progress Note    Mary Petersen Patient Status:  Outpatient in a Bed    1940 MRN [id number]   Location [facility] 4W/SW/SE Attending Lashawn March MD   Hosp Day # 0 PCP Carole Dalal MD     Chief Complaint: s/p right reverse TSR     Subjective:     Constitutional:         Pt resting comfortably this am; states she feels ok.    Plan is for [facility]       Objective:   Physical Exam  Vitals reviewed.       Right Shoulder Exam     Comments:  Pt in NAD.  VSS.  NV status B UE intact.  Grasp and pinch 5/5.  ROM fingers and wrist full and pain free.  Wound clean and dry with a fair amount of bruising upper arm, as expected as the pt is on Eliquis            Results:   Lab Results   Component Value Date    WBC 11.2 (H) 2024    HGB 10.5 (L) 2024    HCT 31.3 (L) 2024    .0 2024    CREATSERUM 0.75 2024    BUN 19 2024     2024    K 4.3 2024     2024    CO2 25.0 2024     (H) 2024    CA 9.4 2024    ALB 4.0 2022    ALKPHO 60 2022    BILT 0.63 2022    TP 6.4 2022    AST 30 2022    ALT 26 2022    PTT 23.6 2018    INR 1.0 2018    TSH 1.470 2022    DDIMER 0.41 2015    ESRML 26 (H) 2022    CRP 0.43 2022    MG 2.5 2023    TROP 3.06 (HH) 2018    CK 55 2023    B12 297 2021       XR SHOULDER, COMPLETE (MIN 2 VIEWS), RIGHT (CPT=73030)    Result Date: 2024  CONCLUSION: Status post total reverse right shoulder arthroplasty.    Dictated by (CST): Blane Haro MD on 2024 at 11:10 AM     Finalized by (CST): Blane Haro MD on 2024 at 11:11 AM               Assessment & Plan:     Aftercare following right shoulder joint replacement surgery  S/p right reverse TSR      Pre-op testing   Cont PT/OT  Dressing change        Asha Nieves,  ENZO  4/21/2024

## 2024-04-21 NOTE — PLAN OF CARE
Barbara is taking Oxy IR for pain but mostly denies pain. Ambulates with 2 assist and the ivette-walker. Wearing blue shoulder sling at all times. She stated that she is getting in and out of bed with less help. Voiding in bathroom. Remote tele monitoring maintained. Ice gel packs in use.   Problem: Patient Centered Care  Goal: Patient preferences are identified and integrated in the patient's plan of care  Description: Interventions:  - What would you like us to know as we care for you?   - Provide timely, complete, and accurate information to patient/family  - Incorporate patient and family knowledge, values, beliefs, and cultural backgrounds into the planning and delivery of care  - Encourage patient/family to participate in care and decision-making at the level they choose  - Honor patient and family perspectives and choices  Outcome: Progressing     Problem: Patient/Family Goals  Goal: Patient/Family Long Term Goal  Description: Patient's Long Term Goal:     Interventions:  -   - See additional Care Plan goals for specific interventions  Outcome: Progressing  Goal: Patient/Family Short Term Goal  Description: Patient's Short Term Goal:     Interventions:   -  - See additional Care Plan goals for specific interventions  Outcome: Progressing     Problem: PAIN - ADULT  Goal: Verbalizes/displays adequate comfort level or patient's stated pain goal  Description: INTERVENTIONS:  - Encourage pt to monitor pain and request assistance  - Assess pain using appropriate pain scale  - Administer analgesics based on type and severity of pain and evaluate response  - Implement non-pharmacological measures as appropriate and evaluate response  - Consider cultural and social influences on pain and pain management  - Manage/alleviate anxiety  - Utilize distraction and/or relaxation techniques  - Monitor for opioid side effects  - Notify MD/LIP if interventions unsuccessful or patient reports new pain  - Anticipate increased pain  with activity and pre-medicate as appropriate  Outcome: Progressing     Problem: RISK FOR INFECTION - ADULT  Goal: Absence of fever/infection during anticipated neutropenic period  Description: INTERVENTIONS  - Monitor WBC  - Administer growth factors as ordered  - Implement neutropenic guidelines  Outcome: Progressing     Problem: SAFETY ADULT - FALL  Goal: Free from fall injury  Description: INTERVENTIONS:  - Assess pt frequently for physical needs  - Identify cognitive and physical deficits and behaviors that affect risk of falls.  - [place] fall precautions as indicated by assessment.  - Educate pt/family on patient safety including physical limitations  - Instruct pt to call for assistance with activity based on assessment  - Modify environment to reduce risk of injury  - Provide assistive devices as appropriate  - Consider OT/PT consult to assist with strengthening/mobility  - Encourage toileting schedule  Outcome: Progressing     Problem: DISCHARGE PLANNING  Goal: Discharge to home or other facility with appropriate resources  Description: INTERVENTIONS:  - Identify barriers to discharge w/pt and caregiver  - Include patient/family/discharge partner in discharge planning  - Arrange for needed discharge resources and transportation as appropriate  - Identify discharge learning needs (meds, wound care, etc)  - Arrange for interpreters to assist at discharge as needed  - Consider post-discharge preferences of patient/family/discharge partner  - Complete POLST form as appropriate  - Assess patient's ability to be responsible for managing their own health  - Refer to Case Management Department for coordinating discharge planning if the patient needs post-hospital services based on physician/LIP order or complex needs related to functional status, cognitive ability or social support system  Outcome: Progressing     Problem: Altered Communication/Language Barrier  Goal: Patient/Family is able to understand and  participate in their care  Description: Interventions:  - Assess communication ability and preferred communication style  - Implement communication aides and strategies  - Use visual cues when possible  - Listen attentively, be patient, do not interrupt  - Minimize distractions  - Allow time for understanding and response  - Establish method for patient to ask for assistance (call light)  - Provide an  as needed  - Communicate barriers and strategies to overcome with those who interact with patient  Outcome: Progressing

## 2024-04-21 NOTE — PLAN OF CARE
Patient is A&Ox4.VSS. RA. POD#2 of a right reverse total shoulder arthroplasty. Sling in place over dressings. Dressing was changed today. Gauze+abd pad+medipore tape are covering steristrips over incision site. Up x1 assist and hemiwalker. Patient is continent, passing gas, no bm today. Miralax administered this afternoon. Scheduled tylenol is being administered for pain control with good relief. Call light and personal belongings are within reach. Rehab is the plan for discharge. [facility] [place], ins auth pending.  Problem: Patient Centered Care  Goal: Patient preferences are identified and integrated in the patient's plan of care  Description: Interventions:  - What would you like us to know as we care for you?   - Provide timely, complete, and accurate information to patient/family  - Incorporate patient and family knowledge, values, beliefs, and cultural backgrounds into the planning and delivery of care  - Encourage patient/family to participate in care and decision-making at the level they choose  - Honor patient and family perspectives and choices  Outcome: Progressing     Problem: Patient/Family Goals  Goal: Patient/Family Long Term Goal  Description: Patient's Long Term Goal:     Interventions:  - See additional Care Plan goals for specific interventions  Outcome: Progressing  Goal: Patient/Family Short Term Goal  Description: Patient's Short Term Goal:     Interventions:   - See additional Care Plan goals for specific interventions  Outcome: Progressing     Problem: PAIN - ADULT  Goal: Verbalizes/displays adequate comfort level or patient's stated pain goal  Description: INTERVENTIONS:  - Encourage pt to monitor pain and request assistance  - Assess pain using appropriate pain scale  - Administer analgesics based on type and severity of pain and evaluate response  - Implement non-pharmacological measures as appropriate and evaluate response  - Consider cultural and social influences on pain and  pain management  - Manage/alleviate anxiety  - Utilize distraction and/or relaxation techniques  - Monitor for opioid side effects  - Notify MD/LIP if interventions unsuccessful or patient reports new pain  - Anticipate increased pain with activity and pre-medicate as appropriate  Outcome: Progressing     Problem: RISK FOR INFECTION - ADULT  Goal: Absence of fever/infection during anticipated neutropenic period  Description: INTERVENTIONS  - Monitor WBC  - Administer growth factors as ordered  - Implement neutropenic guidelines  Outcome: Progressing     Problem: SAFETY ADULT - FALL  Goal: Free from fall injury  Description: INTERVENTIONS:  - Assess pt frequently for physical needs  - Identify cognitive and physical deficits and behaviors that affect risk of falls.  - [place] fall precautions as indicated by assessment.  - Educate pt/family on patient safety including physical limitations  - Instruct pt to call for assistance with activity based on assessment  - Modify environment to reduce risk of injury  - Provide assistive devices as appropriate  - Consider OT/PT consult to assist with strengthening/mobility  - Encourage toileting schedule  Outcome: Progressing     Problem: DISCHARGE PLANNING  Goal: Discharge to home or other facility with appropriate resources  Description: INTERVENTIONS:  - Identify barriers to discharge w/pt and caregiver  - Include patient/family/discharge partner in discharge planning  - Arrange for needed discharge resources and transportation as appropriate  - Identify discharge learning needs (meds, wound care, etc)  - Arrange for interpreters to assist at discharge as needed  - Consider post-discharge preferences of patient/family/discharge partner  - Complete POLST form as appropriate  - Assess patient's ability to be responsible for managing their own health  - Refer to Case Management Department for coordinating discharge planning if the patient needs post-hospital services based on  physician/LIP order or complex needs related to functional status, cognitive ability or social support system  Outcome: Progressing     Problem: Altered Communication/Language Barrier  Goal: Patient/Family is able to understand and participate in their care  Description: Interventions:  - Assess communication ability and preferred communication style  - Implement communication aides and strategies  - Use visual cues when possible  - Listen attentively, be patient, do not interrupt  - Minimize distractions  - Allow time for understanding and response  - Establish method for patient to ask for assistance (call light)  - Provide an  as needed  - Communicate barriers and strategies to overcome with those who interact with patient  Outcome: Progressing

## 2024-04-21 NOTE — PROGRESS NOTES
[facility] Hospitalist Progress Note     CC: Hospital Follow up    PCP: Carole Dalal MD       Assessment/Plan:     Active Problems:    Aftercare following right shoulder joint replacement surgery    Pre-op testing    Patient is a 83 year old female with PMH sig for DVT, afib on eliquis, RA, CAD,JIAN who presented to the hospital for RTSA.     R rotator cuff tear arthropathy  - s/p RTSA POD # 1,4/19  - prn pain medication   - monitor respiratory status  - encouraged IS use  - prn anti emetics  - CBC in the morning, post op hgb of 10.5  - OT/SW, per patient plan is for SNF after surgery  - dvt ppx: eliquis  - rest of management per ortho     pAF  Secondary hypercoagulable state  - resume eliquis and metoprolol     DVT  - eliquis     RA  - holding MTX,resume in 1 week  - prednisone resumed     CAD  - resume ASA when okay with ortho  - resume statin     JIAN  - CPAP     FN:  - IVF: none  - Diet: regular     DVT Prophy: eliquis  Lines: PIV    Dispo: pending clinical course, plan for SNF on dc    Thank You,  Jennifer Bullock DO    Hospitalist with [facility]     Subjective:     Patient seen and examined. States that she is feeling amazing and barely with any pain    OBJECTIVE:    Blood pressure 126/60, pulse 75, temperature 98.2 °F (36.8 °C), temperature source Oral, resp. rate 18, height 5' 8\" (1.727 m), weight 247 lb (112 kg), SpO2 92%.    Temp:  [97.6 °F (36.4 °C)-98.2 °F (36.8 °C)] 98.2 °F (36.8 °C)  Pulse:  [67-75] 75  Resp:  [18] 18  BP: (112-136)/(60-68) 126/60  SpO2:  [92 %-97 %] 92 %      Intake/Output:    Intake/Output Summary (Last 24 hours) at 4/21/2024 1239  Last data filed at 4/21/2024 0928  Gross per 24 hour   Intake 1170 ml   Output 2300 ml   Net -1130 ml       Last 3 Weights   04/19/24 0621 247 lb (112 kg)   04/16/24 0833 240 lb (108.9 kg)   02/28/24 0754 250 lb (113.4 kg)   02/23/24 1151 243 lb (110.2 kg)   04/29/23 1418 245 lb (111.1 kg)       Exam   Gen: No acute  distress  Heent: NC AT, neck supple  Pulm: Lungs clear, normal respiratory effort  CV: Heart with regular rate and rhythm, no peripheral edema  Abd: Abdomen soft, nontender, nondistended  MSK: R post op shoulder  Skin: no rashes or lesions        Data Review:       Labs:     Recent Labs   Lab 04/20/24  0524   RBC 3.18*   HGB 10.5*   HCT 31.3*   MCV 98.4   MCH 33.0   MCHC 33.5   RDW 12.9   WBC 11.2*   .0         Recent Labs   Lab 04/20/24  0524   *   BUN 19   CREATSERUM 0.75   EGFRCR 79   CA 9.4      K 4.3      CO2 25.0       No results for input(s): \"ALT\", \"AST\", \"ALB\", \"AMYLASE\", \"LIPASE\", \"LDH\" in the last 168 hours.    Invalid input(s): \"ALPHOS\", \"TBIL\", \"DBIL\", \"TPROT\"      Imaging:  XR SHOULDER, COMPLETE (MIN 2 VIEWS), RIGHT (CPT=73030)    Result Date: 4/19/2024  CONCLUSION: Status post total reverse right shoulder arthroplasty.    Dictated by (CST): Blane Haro MD on 4/19/2024 at 11:10 AM     Finalized by (CST): Blane Haro MD on 4/19/2024 at 11:11 AM             Meds:      sennosides  17.2 mg Oral Nightly    docusate sodium  100 mg Oral BID    apixaban  5 mg Oral BID@0600,1800    acetaminophen  650 mg Oral Q4H While awake    folic acid  1 mg Oral Daily    gabapentin  100 mg Oral QAM    gabapentin  400 mg Oral Nightly    metoprolol succinate ER  12.5 mg Oral Daily    predniSONE  5 mg Oral Daily    rosuvastatin  5 mg Oral Nightly      lactated ringers Stopped (04/19/24 2330)       polyethylene glycol (PEG 3350)    magnesium hydroxide    bisacodyl    fleet enema    ondansetron    metoclopramide    oxyCODONE **OR** oxyCODONE    HYDROmorphone **OR** HYDROmorphone

## 2024-04-21 NOTE — CM/SW NOTE
04/21/24 1200   CM/SW Referral Data   Referral Source Physician   Reason for Referral Discharge planning   Informant Patient   Medical Hx   Does patient have an established PCP? Yes   Patient Info   Patient's Current Mental Status at Time of Assessment Alert;Oriented   Patient's Home Environment House   Number of Levels in Home 2   Number of Stair in Home 2 in 4 up   Patient lives with Alone   Patient Status Prior to Admission   Independent with ADLs and Mobility Yes   Services in place prior to admission DME/Supplies at home   Type of DME/Supplies Standard Walker;CPAP   Discharge Needs   Anticipated D/C needs Subacute rehab   Services Requested   Submitted to [facility] Yes   PASRR Level 1 Submitted Yes   Choice of Post-Acute Provider   Informed patient of right to choose their preferred provider Yes   List of appropriate post-acute services provided to patient/family with quality data Yes   Patient/family choice 1. [facility] 2. [facility]     CM met with pt at bedside to discuss dc plan.   Pt agrees to [facility]. Has hx of [facility]MAN at a facility in [facility] years ago.   [facility] list provided and reviewed.   Pts 1st choice is [facility] 2nd [facility].     Called and spoke with Erum liaison at [facility] she confirms that they have 2 female beds open and they will start ins auth.  If it is a [place] auth DSC will need to submit in the am.     Plan: [facility] pending ins auth,    / to remain available for support and/or discharge planning.   Sarika Smith RN, BSN  Nurse   [phone]

## 2024-04-22 PROCEDURE — 97530 THERAPEUTIC ACTIVITIES: CPT

## 2024-04-22 NOTE — PHYSICAL THERAPY NOTE
PHYSICAL THERAPY TREATMENT NOTE - INPATIENT     Room Number: 427/427-A       Presenting Problem: R TSA       Problem List  Active Problems:    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    Patient continues to function below baseline with bed mobility, transfers, gait, and standing prolonged periods.  Contributing factors to remaining limitations include decreased functional strength, decreased endurance/aerobic capacity, and decreased muscular endurance.  Next session anticipate patient to progress bed mobility, transfers, gait, and standing prolonged periods.  Physical Therapy will continue to follow patient for duration of hospitalization.    Patient continues to benefit from continued skilled PT services: to promote return to prior level of function and safety with continuous assistance and gradual rehabilitative therapy .    PLAN  PT Treatment Plan: Bed mobility;Body mechanics;Coordination;Endurance;Patient education;Gait training;Strengthening;Transfer training;Balance training  Frequency (Obs): 5x/week    SUBJECTIVE  Pt was agreeable to therapy session.         OBJECTIVE  Precautions: Limb alert - right    WEIGHT BEARING RESTRICTION  R Upper Extremity: Non-Weight Bearing             PAIN ASSESSMENT   Rating:  (did not rate)  Location: R shoulder  Management Techniques: Activity promotion;Body mechanics;Relaxation;Repositioning    BALANCE  Static Sitting: Fair +  Dynamic Sitting: Fair  Static Standing: Fair -  Dynamic Standing: Poor +    ACTIVITY TOLERANCE                          O2 WALK       AM-PAC '6-Clicks' INPATIENT SHORT FORM - BASIC MOBILITY  How much difficulty does the patient currently have...  Patient Difficulty: Turning over in bed (including adjusting bedclothes, sheets and blankets)?: A Little   Patient Difficulty: Sitting down on and standing up  from a chair with arms (e.g., wheelchair, bedside commode, etc.): A Lot   Patient Difficulty: Moving from lying on back to sitting on the side of the bed?: A Little   How much help from another person does the patient currently need...   Help from Another: Moving to and from a bed to a chair (including a wheelchair)?: A Little   Help from Another: Need to walk in hospital room?: A Little   Help from Another: Climbing 3-5 steps with a railing?: A Lot     AM-PAC Score:  Raw Score: 16   Approx Degree of Impairment: 54.16%   Standardized Score (AM-PAC Scale): 40.78   CMS Modifier (G-Code): CK    FUNCTIONAL ABILITY STATUS  Functional Mobility/Gait Assessment  Gait Assistance: Minimum assistance  Distance (ft): 30'x2  Assistive Device: Other (Comment) (ivette walker)  Pattern: Shuffle (narrow JENNIFER)  Rolling:  NT  Supine to Sit:  NT  Sit to Supine:  NT  Sit to Stand: moderate assist    Additional information: Pt is received in the chair and was cleared for therapy session. Pt with R shoulder immobilizer already donned and in place. Pt was able to maintained her NWB status throughout the session. Pt is mod A with sit<>stand transfers with the use of the ivette walker. Pt with good standing balance. Pt was able to AMB about 30'x2 with the ivette walker and was min/CGA for balance. Pt with decreased step length with narrow JENNIFER. Pt is below her functional baseline. Pt was returned back to the room and to sitting in the chair with all needs within reach.     The patient's Approx Degree of Impairment: 54.16% has been calculated based on documentation in the [facility] '6 clicks' Inpatient Daily Activity Short Form.  Research supports that patients with this level of impairment may benefit from Rehab.  Final disposition will be made by interdisciplinary medical team.        Patient End of Session: Up in chair;Needs met;Call light within reach;RN aware of session/findings;All patient questions and concerns addressed;Bracing education provided  per handout    CURRENT GOALS   Goals to be met by: 4/27/24  Patient Goal Patient's self-stated goal is: go to rehab   Goal #1 Patient is able to demonstrate supine - sit EOB @ level: supervision     Goal #1   Current Status NT received in the chair   Goal #2 Patient is able to demonstrate transfers Sit to/from Stand at assistance level: supervision with walker - ivette     Goal #2  Current Status Mod A with the ivette walker   Goal #3 Patient is able to ambulate 80 feet with assist device: walker - ivette at assistance level: supervision   Goal #3   Current Status 30'x2 with the ivette walker CGA/min A    Goal #4    Goal #4   Current Status    Goal #5 Patient to demonstrate independence with home activity/exercise instructions provided to patient in preparation for discharge.   Goal #5   Current Status IN PROGRESS   Goal #6    Goal #6  Current Status        Therapeutic Activity: 23 minutes

## 2024-04-22 NOTE — PROGRESS NOTES
[facility] Hospitalist Progress Note     CC: Hospital Follow up    PCP: Carole Dalal MD       Assessment/Plan:     Active Problems:    Aftercare following right shoulder joint replacement surgery    Pre-op testing    Patient is a 83 year old female with PMH sig for DVT, afib on eliquis, RA, CAD,JIAN who presented to the hospital for RTSA.     R rotator cuff tear arthropathy  - s/p RTSA ,4/19  - prn pain medication   - monitor respiratory status  - encouraged IS use  - prn anti emetics  - CBC in the morning, post op hgb of 10.5  - OT/SW, per patient plan is for SNF after surgery  - dvt ppx: eliquis  - rest of management per ortho     pAF  Secondary hypercoagulable state  - resume eliquis and metoprolol     DVT  - eliquis     RA  - holding MTX,resume in 1 week  - prednisone resumed     CAD  - resume ASA when okay with ortho  - resume statin     JIAN  - CPAP     FN:  - IVF: none  - Diet: regular     DVT Prophy: eliquis  Lines: PIV    Dispo: pending clinical course, plan for SNF on dc, awaiting on insurance auth    Thank You,  Jennifer Bullock DO    Hospitalist with [facility]     Subjective:     Patient seen and examined. States that she is feeling amazing and barely with any pain    OBJECTIVE:    Blood pressure 116/53, pulse 71, temperature 98 °F (36.7 °C), temperature source Oral, resp. rate 16, height 5' 8\" (1.727 m), weight 247 lb (112 kg), SpO2 93%.    Temp:  [97.5 °F (36.4 °C)-98 °F (36.7 °C)] 98 °F (36.7 °C)  Pulse:  [71] 71  Resp:  [16-18] 16  BP: (116-127)/(53-68) 116/53  SpO2:  [92 %-93 %] 93 %      Intake/Output:    Intake/Output Summary (Last 24 hours) at 4/22/2024 1214  Last data filed at 4/22/2024 0900  Gross per 24 hour   Intake 917 ml   Output 1750 ml   Net -833 ml       Last 3 Weights   04/19/24 0621 247 lb (112 kg)   04/16/24 0833 240 lb (108.9 kg)   02/28/24 0754 250 lb (113.4 kg)   02/23/24 1151 243 lb (110.2 kg)   04/29/23 1418 245 lb (111.1 kg)       Exam   Gen: No  acute distress  Heent: NC AT, neck supple  Pulm: Lungs clear, normal respiratory effort  CV: Heart with regular rate and rhythm, no peripheral edema  Abd: Abdomen soft, nontender, nondistended  MSK: R post op shoulder  Skin: no rashes or lesions        Data Review:       Labs:     Recent Labs   Lab 04/20/24  0524   RBC 3.18*   HGB 10.5*   HCT 31.3*   MCV 98.4   MCH 33.0   MCHC 33.5   RDW 12.9   WBC 11.2*   .0         Recent Labs   Lab 04/20/24  0524   *   BUN 19   CREATSERUM 0.75   EGFRCR 79   CA 9.4      K 4.3      CO2 25.0       No results for input(s): \"ALT\", \"AST\", \"ALB\", \"AMYLASE\", \"LIPASE\", \"LDH\" in the last 168 hours.    Invalid input(s): \"ALPHOS\", \"TBIL\", \"DBIL\", \"TPROT\"      Imaging:  No results found.      Meds:      sennosides  17.2 mg Oral Nightly    docusate sodium  100 mg Oral BID    apixaban  5 mg Oral BID@0600,1800    acetaminophen  650 mg Oral Q4H While awake    folic acid  1 mg Oral Daily    gabapentin  100 mg Oral QAM    gabapentin  400 mg Oral Nightly    metoprolol succinate ER  12.5 mg Oral Daily    predniSONE  5 mg Oral Daily    rosuvastatin  5 mg Oral Nightly      lactated ringers Stopped (04/19/24 2330)       polyethylene glycol (PEG 3350)    magnesium hydroxide    bisacodyl    fleet enema    ondansetron    metoclopramide    oxyCODONE **OR** oxyCODONE    HYDROmorphone **OR** HYDROmorphone

## 2024-04-22 NOTE — PLAN OF CARE
Barbara is A&Ox4 on room air. She is Post op day 3. She is up and voiding freely. Last BM was today. She is a standby assist. She has an immobilizer in place for the right shoulder. She uses a CPAP at night. Has SCDs and eliquis for DVT prophylaxis. She is in general diet. IV infiltrated early in the shift. Per MD it is ok to stay out and tele got discontinued. Plan is for dario burden pending insurance autho.  Problem: Patient Centered Care  Goal: Patient preferences are identified and integrated in the patient's plan of care  Description: Interventions:  - What would you like us to know as we care for you?   - Provide timely, complete, and accurate information to patient/family  - Incorporate patient and family knowledge, values, beliefs, and cultural backgrounds into the planning and delivery of care  - Encourage patient/family to participate in care and decision-making at the level they choose  - Honor patient and family perspectives and choices  Outcome: Progressing     Problem: PAIN - ADULT  Goal: Verbalizes/displays adequate comfort level or patient's stated pain goal  Description: INTERVENTIONS:  - Encourage pt to monitor pain and request assistance  - Assess pain using appropriate pain scale  - Administer analgesics based on type and severity of pain and evaluate response  - Implement non-pharmacological measures as appropriate and evaluate response  - Consider cultural and social influences on pain and pain management  - Manage/alleviate anxiety  - Utilize distraction and/or relaxation techniques  - Monitor for opioid side effects  - Notify MD/LIP if interventions unsuccessful or patient reports new pain  - Anticipate increased pain with activity and pre-medicate as appropriate  Outcome: Progressing     Problem: RISK FOR INFECTION - ADULT  Goal: Absence of fever/infection during anticipated neutropenic period  Description: INTERVENTIONS  - Monitor WBC  - Administer growth factors as ordered  - Implement  neutropenic guidelines  Outcome: Progressing     Problem: SAFETY ADULT - FALL  Goal: Free from fall injury  Description: INTERVENTIONS:  - Assess pt frequently for physical needs  - Identify cognitive and physical deficits and behaviors that affect risk of falls.  - [place] fall precautions as indicated by assessment.  - Educate pt/family on patient safety including physical limitations  - Instruct pt to call for assistance with activity based on assessment  - Modify environment to reduce risk of injury  - Provide assistive devices as appropriate  - Consider OT/PT consult to assist with strengthening/mobility  - Encourage toileting schedule  Outcome: Progressing     Problem: DISCHARGE PLANNING  Goal: Discharge to home or other facility with appropriate resources  Description: INTERVENTIONS:  - Identify barriers to discharge w/pt and caregiver  - Include patient/family/discharge partner in discharge planning  - Arrange for needed discharge resources and transportation as appropriate  - Identify discharge learning needs (meds, wound care, etc)  - Arrange for interpreters to assist at discharge as needed  - Consider post-discharge preferences of patient/family/discharge partner  - Complete POLST form as appropriate  - Assess patient's ability to be responsible for managing their own health  - Refer to Case Management Department for coordinating discharge planning if the patient needs post-hospital services based on physician/LIP order or complex needs related to functional status, cognitive ability or social support system  Outcome: Progressing     Problem: Altered Communication/Language Barrier  Goal: Patient/Family is able to understand and participate in their care  Description: Interventions:  - Assess communication ability and preferred communication style  - Implement communication aides and strategies  - Use visual cues when possible  - Listen attentively, be patient, do not interrupt  - Minimize  distractions  - Allow time for understanding and response  - Establish method for patient to ask for assistance (call light)  - Provide an  as needed  - Communicate barriers and strategies to overcome with those who interact with patient  Outcome: Progressing

## 2024-04-22 NOTE — CM/SW NOTE
TESSA followed up on DC planning.     TESSA attempted to start ins auth with [employer]. Was unable to. Confirmed with Marta from [facility] who states CM on her end states this insurance auth they submit on their end.     Insurance Auth needed for Rehab    PLAN: DC to [facility] pending ins auth     Marianne Boyle, JADA, MSW ext. [phone]

## 2024-04-22 NOTE — OCCUPATIONAL THERAPY NOTE
OCCUPATIONAL THERAPY TREATMENT NOTE - INPATIENT    Room Number: 427/427-A     Presenting Problem: R TSA- ok for wrist, hand, elbow; NWB     Problem List  Active Problems:    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    Patient continues to function below baseline with ADLs and functional mobility.   Contributing factors to remaining limitations include limited reach, pain, limited RUE ROM --immobilized, decreased standing balance.  Next session anticipate patient to progress LE dressing, brace management, functional transfers.  Occupational Therapy will continue to follow patient for duration of hospitalization.    Patient continues to benefit from continued skilled OT services: to promote return to prior level of function and safety with continuous assistance and gradual rehabilitative therapy .     PLAN  OT Treatment Plan: Balance activities;Energy conservation/work simplification techniques;ADL training;Functional transfer training;Endurance training;Patient/Family education;Patient/Family training;Compensatory technique education  OT Device Recommendations: TBD    SUBJECTIVE  \"My arm feels better after adjusting the sling\"     OBJECTIVE  Precautions: Limb alert - right    WEIGHT BEARING RESTRICTION  R Upper Extremity: Non-Weight Bearing    PAIN ASSESSMENT  Ratin    ACTIVITY TOLERANCE  Room air      ACTIVITIES OF DAILY LIVING ASSESSMENT  AM-PAC ‘6-Clicks’ Inpatient Daily Activity Short Form  How much help from another person does the patient currently need…  -   Putting on and taking off regular lower body clothing?: A Lot  -   Bathing (including washing, rinsing, drying)?: A Lot  -   Toileting, which includes using toilet, bedpan or urinal? : A Lot  -   Putting on and taking off regular upper body clothing?: A Lot  -   Taking care of personal grooming such as  brushing teeth?: A Little  -   Eating meals?: A Little    AM-PAC Score:  Score: 14  Approx Degree of Impairment: 59.67%  Standardized Score (AM-PAC Scale): 33.39  CMS Modifier (G-Code): CK    FUNCTIONAL TRANSFER ASSESSMENT  Sit to Stand: Edge of Bed  Edge of Bed: Minimal Assist    BED MOBILITY  Rolling: Not Tested  Supine to Sit : Minimal Assist  Scooting: Min A    BALANCE ASSESSMENT  Static Standing: Minimal Assist    FUNCTIONAL ADL ASSESSMENT  Eating: Stand-by Assist  Grooming Seated: Minimal Assist  Bathing Seated: Minimal Assist  UB Dressing Seated: Moderate Assist  LB Dressing Seated: Moderate Assist  Toileting Seated: Minimal Assist    Skilled Therapy Provided: RN cleared pt for participation in occupational therapy session, which was completed in collaboration with physical therapy. Upon arrival, pt was supine in bed and agreeable to activity. No family was present during session. Pt benefited from additional time, verbal cues to maximize participation.     Pt reported sling feeling uncomfortable -- sling doffed and re-donned while pt was seated eob, readjusted to maximize support. Pt instructed on plan of care, role of therapy. Educated pt on NWB status of RUE, ROM restrictions as well as compensatory strategies for managing (brace management, bed mobility, functional transfers) and additional time discussing positioning strategies.  Pt receptive to education.     EDUCATION PROVIDED  Patient: Role of Occupational Therapy; Plan of Care; Discharge Recommendations; Functional Transfer Techniques; Surgical Precautions; Posture/Positioning; Compensatory ADL Techniques  Patient's Response to Education: Verbalized Understanding    The patient's Approx Degree of Impairment: 59.67% has been calculated based on documentation in the [facility] '6 clicks' Inpatient Daily Activity Short Form.  Research supports that patients with this level of impairment may benefit from rehab.  Final disposition will be made by  interdisciplinary medical team.    Patient End of Session: In bed;Needs met;Call light within reach;RN aware of session/findings    OT Goals:    Patient self-stated goal is: to go to rehab       Patient will complete LE dressing with SBA   Comment:  ongoing    Patient will complete toilet transfer with SBA   Comment:  ongoing     Patient will complete self care task at sink level with SBA    Comment: not tested      Comment:            Goals  on:   Frequency:3x week     OT Session Time: 25 minutes  Therapeutic Activity: 25 minutes

## 2024-04-23 PROCEDURE — 97110 THERAPEUTIC EXERCISES: CPT

## 2024-04-23 PROCEDURE — 97530 THERAPEUTIC ACTIVITIES: CPT

## 2024-04-23 NOTE — PROGRESS NOTES
[facility] Hospitalist Progress Note     CC: Hospital Follow up    PCP: Carole Dalal MD       Assessment/Plan:     Active Problems:    Aftercare following right shoulder joint replacement surgery    Pre-op testing    Patient is a 83 year old female with PMH sig for DVT, afib on eliquis, RA, CAD,JIAN who presented to the hospital for RTSA.     R rotator cuff tear arthropathy  - s/p RTSA ,4/19  - prn pain medication   - monitor respiratory status  - encouraged IS use  - prn anti emetics  - CBC in the morning, post op hgb of 10.5  - OT/SW, per patient plan is for SNF after surgery  - dvt ppx: eliquis  - rest of management per ortho     pAF  Secondary hypercoagulable state  - resume eliquis and metoprolol     DVT  - eliquis     RA  - holding MTX,resume in 1 week  - prednisone resumed     CAD  - resume ASA when okay with ortho  - resume statin     JIAN  - CPAP     FN:  - IVF: none  - Diet: regular     DVT Prophy: eliquis  Lines: PIV    Dispo: pending clinical course, plan for SNF on dc, awaiting on insurance auth    Thank You,  Jennifer Bullock DO    Hospitalist with [facility]     Subjective:     Patient seen and examined. Sitting up in chair, feels well with no complaints    OBJECTIVE:    Blood pressure 121/80, pulse 77, temperature 98.2 °F (36.8 °C), temperature source Oral, resp. rate 18, height 5' 8\" (1.727 m), weight 247 lb (112 kg), SpO2 94%.    Temp:  [97.9 °F (36.6 °C)-98.4 °F (36.9 °C)] 98.2 °F (36.8 °C)  Pulse:  [68-77] 77  Resp:  [18] 18  BP: ()/(60-96) 121/80  SpO2:  [92 %-94 %] 94 %      Intake/Output:    Intake/Output Summary (Last 24 hours) at 4/23/2024 1102  Last data filed at 4/23/2024 0840  Gross per 24 hour   Intake 580 ml   Output --   Net 580 ml       Last 3 Weights   04/19/24 0621 247 lb (112 kg)   04/16/24 0833 240 lb (108.9 kg)   02/28/24 0754 250 lb (113.4 kg)   02/23/24 1151 243 lb (110.2 kg)   04/29/23 1418 245 lb (111.1 kg)       Exam   Gen: No acute  distress  Heent: NC AT, neck supple  Pulm: Lungs clear, normal respiratory effort  CV: Heart with regular rate and rhythm, no peripheral edema  Abd: Abdomen soft, nontender, nondistended  MSK: R post op shoulder  Skin: no rashes or lesions        Data Review:       Labs:     Recent Labs   Lab 04/20/24  0524   RBC 3.18*   HGB 10.5*   HCT 31.3*   MCV 98.4   MCH 33.0   MCHC 33.5   RDW 12.9   WBC 11.2*   .0         Recent Labs   Lab 04/20/24  0524   *   BUN 19   CREATSERUM 0.75   EGFRCR 79   CA 9.4      K 4.3      CO2 25.0       No results for input(s): \"ALT\", \"AST\", \"ALB\", \"AMYLASE\", \"LIPASE\", \"LDH\" in the last 168 hours.    Invalid input(s): \"ALPHOS\", \"TBIL\", \"DBIL\", \"TPROT\"      Imaging:  No results found.      Meds:      sennosides  17.2 mg Oral Nightly    docusate sodium  100 mg Oral BID    apixaban  5 mg Oral BID@0600,1800    acetaminophen  650 mg Oral Q4H While awake    folic acid  1 mg Oral Daily    gabapentin  100 mg Oral QAM    gabapentin  400 mg Oral Nightly    metoprolol succinate ER  12.5 mg Oral Daily    predniSONE  5 mg Oral Daily    rosuvastatin  5 mg Oral Nightly      lactated ringers Stopped (04/19/24 2330)       polyethylene glycol (PEG 3350)    magnesium hydroxide    bisacodyl    fleet enema    ondansetron    metoclopramide    oxyCODONE **OR** oxyCODONE    HYDROmorphone **OR** HYDROmorphone

## 2024-04-23 NOTE — PHYSICAL THERAPY NOTE
PHYSICAL THERAPY TREATMENT NOTE - INPATIENT     Room Number: 427/427-A       Presenting Problem: R TSA       Problem List  Active Problems:    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    Patient continues to function below baseline with bed mobility.  Contributing factors to remaining limitations include decreased functional strength, decreased endurance/aerobic capacity, and pain.  Next session anticipate patient to progress bed mobility, transfers, and gait.  Physical Therapy will continue to follow patient for duration of hospitalization.    Patient continues to benefit from continued skilled PT services: to promote return to prior level of function and safety with continuous assistance and gradual rehabilitative therapy .    PLAN  PT Treatment Plan: Body mechanics;Endurance;Coordination  Frequency (Obs): 5x/week    SUBJECTIVE  Pt reports being ready for PT RX    OBJECTIVE  Precautions: Limb alert - right    WEIGHT BEARING RESTRICTION  R Upper Extremity: Non-Weight Bearing             PAIN ASSESSMENT   Ratin  Location: R shoulder  Management Techniques: Activity promotion;Body mechanics;Relaxation;Repositioning    BALANCE  Static Sitting: Fair +  Dynamic Sitting: Fair  Static Standing: Fair -  Dynamic Standing: Poor +    ACTIVITY TOLERANCE           BP: 121/80  BP Location: Left arm  BP Method: Automatic  Patient Position: Sitting     O2 WALK       AM-PAC '6-Clicks' INPATIENT SHORT FORM - BASIC MOBILITY  How much difficulty does the patient currently have...  Patient Difficulty: Turning over in bed (including adjusting bedclothes, sheets and blankets)?: A Little   Patient Difficulty: Sitting down on and standing up from a chair with arms (e.g., wheelchair, bedside commode, etc.): A Lot   Patient Difficulty: Moving from lying on back to sitting on the side of the  bed?: A Little   How much help from another person does the patient currently need...   Help from Another: Moving to and from a bed to a chair (including a wheelchair)?: A Little   Help from Another: Need to walk in hospital room?: A Little   Help from Another: Climbing 3-5 steps with a railing?: A Lot     AM-PAC Score:  Raw Score: 16   Approx Degree of Impairment: 54.16%   Standardized Score (AM-PAC Scale): 40.78   CMS Modifier (G-Code): CK    FUNCTIONAL ABILITY STATUS  Functional Mobility/Gait Assessment  Gait Assistance: Minimum assistance  Distance (ft): 2x50  Assistive Device:  (hemiwalker)  Pattern: Shuffle  Rolling: minimal assist  Supine to Sit: minimal assist  Sit to Supine: minimal assist  Sit to Stand: minimal assist    Additional information: Pt seen daily.Min a fr bed mobility and transfer.Extra time provided to complete task..EOB sitting balance activity with emphasis on core stabilization.Pt amb 2 x 50 ft with ivette walker and min a.There ex    The patient's Approx Degree of Impairment: 54.16% has been calculated based on documentation in the [facility] '6 clicks' Inpatient Daily Activity Short Form.  Research supports that patients with this level of impairment may benefit from Sub-acute rehabilitation..  Final disposition will be made by interdisciplinary medical team.    THERAPEUTIC EXERCISES  Lower Extremity Ankle pumps  Glut sets  Hip AB/AD  Heel slides  Quad sets     Position Supine       Patient End of Session: Up in chair;Call light within reach;RN aware of session/findings;All patient questions and concerns addressed    CURRENT GOALS     Patient Goal Patient's self-stated goal is: go to rehab   Goal #1 Patient is able to demonstrate supine - sit EOB @ level: supervision     Goal #1   Current Status Min a   Goal #2 Patient is able to demonstrate transfers Sit to/from Stand at assistance level: supervision with walker - ivette     Goal #2  Current Status Min A with the ivette walker   Goal #3 Patient is  able to ambulate 80 feet with assist device: walker - ivette at assistance level: supervision   Goal #3   Current Status 50'x2 with the ivette walker min A    Goal #4    Goal #4   Current Status    Goal #5 Patient to demonstrate independence with home activity/exercise instructions provided to patient in preparation for discharge.   Goal #5   Current Status IN PROGRESS   Goal #6    Goal #6  Current Status      Gait Training:  minutes  Therapeutic Activity: 15 minutes  Neuromuscular Re-education:  minutes  Therapeutic Exercise: 15 minutes  [place] Repositioning:  minutes  Manual Therapy:  minutes  Can add/delete any of these

## 2024-04-23 NOTE — PLAN OF CARE
Barbara is A&Ox4 on room air. She is Post op day 4. She has an immobilizer sling in place. She is voiding freely. Last BM was 04/22. Pain has been managed with scheduled tylenol. She is Ambulating in [address] with ivette walker. Plan is for dario burden pending insurance autho. Call light in reach.  Problem: Patient Centered Care  Goal: Patient preferences are identified and integrated in the patient's plan of care  Description: Interventions:  - What would you like us to know as we care for you?   - Provide timely, complete, and accurate information to patient/family  - Incorporate patient and family knowledge, values, beliefs, and cultural backgrounds into the planning and delivery of care  - Encourage patient/family to participate in care and decision-making at the level they choose  - Honor patient and family perspectives and choices  Outcome: Progressing       Problem: Patient/Family Goals  Goal: Patient/Family Long Term Goal  Description: Patient's Long Term Goal: to go to [facility]    Interventions: follow MD orders  -work with PT/OT      Outcome: Progressing  Goal: Patient/Family Short Term Goal  Description: Patient's Short Term Goal: ambulate more    Interventions:   - - have someone to walk with  Medicate as needed for pain    -  Outcome: Progressing     Problem: PAIN - ADULT  Goal: Verbalizes/displays adequate comfort level or patient's stated pain goal  Description: INTERVENTIONS:  - Encourage pt to monitor pain and request assistance  - Assess pain using appropriate pain scale  - Administer analgesics based on type and severity of pain and evaluate response  - Implement non-pharmacological measures as appropriate and evaluate response  - Consider cultural and social influences on pain and pain management  - Manage/alleviate anxiety  - Utilize distraction and/or relaxation techniques  - Monitor for opioid side effects  - Notify MD/LIP if interventions unsuccessful or patient reports new pain  - Anticipate increased  pain with activity and pre-medicate as appropriate  Outcome: Progressing     Problem: RISK FOR INFECTION - ADULT  Goal: Absence of fever/infection during anticipated neutropenic period  Description: INTERVENTIONS  - Monitor WBC  - Administer growth factors as ordered  - Implement neutropenic guidelines  Outcome: Progressing     Problem: SAFETY ADULT - FALL  Goal: Free from fall injury  Description: INTERVENTIONS:  - Assess pt frequently for physical needs  - Identify cognitive and physical deficits and behaviors that affect risk of falls.  - [place] fall precautions as indicated by assessment.  - Educate pt/family on patient safety including physical limitations  - Instruct pt to call for assistance with activity based on assessment  - Modify environment to reduce risk of injury  - Provide assistive devices as appropriate  - Consider OT/PT consult to assist with strengthening/mobility  - Encourage toileting schedule  Outcome: Progressing     Problem: DISCHARGE PLANNING  Goal: Discharge to home or other facility with appropriate resources  Description: INTERVENTIONS:  - Identify barriers to discharge w/pt and caregiver  - Include patient/family/discharge partner in discharge planning  - Arrange for needed discharge resources and transportation as appropriate  - Identify discharge learning needs (meds, wound care, etc)  - Arrange for interpreters to assist at discharge as needed  - Consider post-discharge preferences of patient/family/discharge partner  - Complete POLST form as appropriate  - Assess patient's ability to be responsible for managing their own health  - Refer to Case Management Department for coordinating discharge planning if the patient needs post-hospital services based on physician/LIP order or complex needs related to functional status, cognitive ability or social support system  Outcome: Progressing

## 2024-04-23 NOTE — PLAN OF CARE
Problem: Patient Centered Care  Goal: Patient preferences are identified and integrated in the patient's plan of care  Description: Interventions:  - What would you like us to know as we care for you? I would like to go to rehab when discharged.   - Provide timely, complete, and accurate information to patient/family  - Incorporate patient and family knowledge, values, beliefs, and cultural backgrounds into the planning and delivery of care  - Encourage patient/family to participate in care and decision-making at the level they choose  - Honor patient and family perspectives and choices  Outcome: Progressing    Problem: PAIN - ADULT  Goal: Verbalizes/displays adequate comfort level or patient's stated pain goal  Description: INTERVENTIONS:  - Encourage pt to monitor pain and request assistance  - Assess pain using appropriate pain scale  - Administer analgesics based on type and severity of pain and evaluate response  - Implement non-pharmacological measures as appropriate and evaluate response  - Consider cultural and social influences on pain and pain management  - Manage/alleviate anxiety  - Utilize distraction and/or relaxation techniques  - Monitor for opioid side effects  - Notify MD/LIP if interventions unsuccessful or patient reports new pain  - Anticipate increased pain with activity and pre-medicate as appropriate  Outcome: Progressing     Problem: RISK FOR INFECTION - ADULT  Goal: Absence of fever/infection during anticipated neutropenic period  Description: INTERVENTIONS  - Monitor WBC  - Administer growth factors as ordered  - Implement neutropenic guidelines  Outcome: Progressing     Problem: SAFETY ADULT - FALL  Goal: Free from fall injury  Description: INTERVENTIONS:  - Assess pt frequently for physical needs  - Identify cognitive and physical deficits and behaviors that affect risk of falls.  - [place] fall precautions as indicated by assessment.  - Educate pt/family on patient safety including  physical limitations  - Instruct pt to call for assistance with activity based on assessment  - Modify environment to reduce risk of injury  - Provide assistive devices as appropriate  - Consider OT/PT consult to assist with strengthening/mobility  - Encourage toileting schedule  Outcome: Progressing     Problem: DISCHARGE PLANNING  Goal: Discharge to home or other facility with appropriate resources  Description: INTERVENTIONS:  - Identify barriers to discharge w/pt and caregiver  - Include patient/family/discharge partner in discharge planning  - Arrange for needed discharge resources and transportation as appropriate  - Identify discharge learning needs (meds, wound care, etc)  - Arrange for interpreters to assist at discharge as needed  - Consider post-discharge preferences of patient/family/discharge partner  - Complete POLST form as appropriate  - Assess patient's ability to be responsible for managing their own health  - Refer to Case Management Department for coordinating discharge planning if the patient needs post-hospital services based on physician/LIP order or complex needs related to functional status, cognitive ability or social support system  Outcome: Progressing     Patient is alert and oriented x4. No acute changes during shift. Safety and fall precautions in place, call light within reach.

## 2024-04-23 NOTE — PROGRESS NOTES
3/12/2024  Mary Petersen  12/30/1940  83 year old   female  No name on file    HPI:   No chief complaint on file.      The patient complains of pain located right shoulder.  The pain is consistent with their post-operative status.  The patient denies redness, swelling, or drainage from their wound.  The patient denies systemic symptoms.  Unable to walk without her walker and difficulty with transfers with NWB RUE. Starting to train with hemiwalker and therapy.    EXAM:   /66 (BP Location: Left arm)   Pulse 65   Temp 98 °F (36.7 °C) (Oral)   Resp 16   Ht 5' 8\" (1.727 m)   Wt 247 lb (112 kg)   SpO2 91%   BMI 37.56 kg/m²   The patient is awake and oriented x 3 and overall well appearing.  The patient has normal mood.  The patient is non-tender and atraumatic with the exception of their right upper extremity.  The patient's wound is well approximated without evidence of erythema or drainage.  The patient's upper extremities are warm and pink with brisk capillary refill and 2+ radial pulses.  Sensation is intact to light touch in axillary, median, ulnar, and radial nerve distributions.  The patient has 5/5 strength in biceps, triceps, wrist extension, wrist flexion, finger flexion, finger extension, EPL, FPL, and interosseous muscle function.  She has a deltoid contraction.      ASSESSMENT AND PLAN:   S/p R reverse TSA    NWB RUE, minimize shoulder motion, AROM elbow, wrist, and fingers  PT/OT -- gait and balance training, fall risk prevention   DVT prophylaxis  Discharge planning to SNF when approved  Follow-up 2 weeks.

## 2024-04-24 VITALS
HEART RATE: 71 BPM | RESPIRATION RATE: 16 BRPM | HEIGHT: 68 IN | BODY MASS INDEX: 37.44 KG/M2 | TEMPERATURE: 98 F | SYSTOLIC BLOOD PRESSURE: 106 MMHG | DIASTOLIC BLOOD PRESSURE: 75 MMHG | WEIGHT: 247 LBS | OXYGEN SATURATION: 94 %

## 2024-04-24 PROCEDURE — 97530 THERAPEUTIC ACTIVITIES: CPT

## 2024-04-24 PROCEDURE — 97116 GAIT TRAINING THERAPY: CPT

## 2024-04-24 NOTE — CM/SW NOTE
04/24/24 1100   Discharge disposition   Expected discharge disposition subacute   Post Acute Care Provider Other   Discharge transportation [facility] notified by [facility] liasion that insurance auth has been approved and bed available today at [facility].     RN to call report to [facility] at [phone].    Plan: [facility] arranged for pickup 4/24 at 2 pm. PCS form completed in Epic, RN to print with AVS. Patient/family notified transport is not covered by insurance. Pt/family are agreeable to the charges.    Abena Roblero RN, BSN  Nurse   [phone]

## 2024-04-24 NOTE — PLAN OF CARE
Barbara is from home alone. Pod 5 right reverse total shoulder arthroplasty w/Dr. March. Cms wnl, fabienne diet,passing flatus-lbm 4/22 miralax/colace given. Rue in sling/immobilizer-nwb-oob with one assist and hemiwalker-safety intact,scds/eliquis, oxy prn/tylenol atc/gel ice application, voiding, drsg c/d/I-aquacel-changed yesterday per noc rn-c/d/I-afebrile w/no s/s infection. To [facility] of [place] via [facility] at 1400-refer to dc record/summary-report  given to receiving rn Corinne at facility with this rn's call back number and name for any further questions  Problem: Patient Centered Care  Goal: Patient preferences are identified and integrated in the patient's plan of care  Description: Interventions:  - What would you like us to know as we care for you? I live alone, I have had the same surgery on my other shoulder  - Provide timely, complete, and accurate information to patient/family  - Incorporate patient and family knowledge, values, beliefs, and cultural backgrounds into the planning and delivery of care  - Encourage patient/family to participate in care and decision-making at the level they choose  - Honor patient and family perspectives and choices  Outcome: Adequate for Discharge     Problem: Patient/Family Goals  Goal: Patient/Family Long Term Goal  Description: Patient's Long Term Goal: return to baseline adls    Interventions:  - pt/ot  Sw dc planning  Pain control  - See additional Care Plan goals for specific interventions  Outcome: Adequate for Discharge  Goal: Patient/Family Short Term Goal  Description: Patient's Short Term Goal: rehab today    Interventions:   - vs/labs wnl  Fabienne  diet  Pain well controlled  Sw to facilitate  - See additional Care Plan goals for specific interventions  Outcome: Adequate for Discharge     Problem: PAIN - ADULT  Goal: Verbalizes/displays adequate comfort level or patient's stated pain goal  Description: INTERVENTIONS:  - Encourage pt to monitor pain and request  assistance  - Assess pain using appropriate pain scale  - Administer analgesics based on type and severity of pain and evaluate response  - Implement non-pharmacological measures as appropriate and evaluate response  - Consider cultural and social influences on pain and pain management  - Manage/alleviate anxiety  - Utilize distraction and/or relaxation techniques  - Monitor for opioid side effects  - Notify MD/LIP if interventions unsuccessful or patient reports new pain  - Anticipate increased pain with activity and pre-medicate as appropriate  Outcome: Adequate for Discharge     Problem: RISK FOR INFECTION - ADULT  Goal: Absence of fever/infection during anticipated neutropenic period  Description: INTERVENTIONS  - Monitor WBC  - Administer growth factors as ordered  - Implement neutropenic guidelines  Outcome: Adequate for Discharge     Problem: SAFETY ADULT - FALL  Goal: Free from fall injury  Description: INTERVENTIONS:  - Assess pt frequently for physical needs  - Identify cognitive and physical deficits and behaviors that affect risk of falls.  - [place] fall precautions as indicated by assessment.  - Educate pt/family on patient safety including physical limitations  - Instruct pt to call for assistance with activity based on assessment  - Modify environment to reduce risk of injury  - Provide assistive devices as appropriate  - Consider OT/PT consult to assist with strengthening/mobility  - Encourage toileting schedule  Outcome: Adequate for Discharge     Problem: DISCHARGE PLANNING  Goal: Discharge to home or other facility with appropriate resources  Description: INTERVENTIONS:  - Identify barriers to discharge w/pt and caregiver  - Include patient/family/discharge partner in discharge planning  - Arrange for needed discharge resources and transportation as appropriate  - Identify discharge learning needs (meds, wound care, etc)  - Arrange for interpreters to assist at discharge as needed  - Consider  post-discharge preferences of patient/family/discharge partner  - Complete POLST form as appropriate  - Assess patient's ability to be responsible for managing their own health  - Refer to Case Management Department for coordinating discharge planning if the patient needs post-hospital services based on physician/LIP order or complex needs related to functional status, cognitive ability or social support system  Outcome: Adequate for Discharge     Problem: Altered Communication/Language Barrier  Goal: Patient/Family is able to understand and participate in their care  Description: Interventions:  - Assess communication ability and preferred communication style  - Implement communication aides and strategies  - Use visual cues when possible  - Listen attentively, be patient, do not interrupt  - Minimize distractions  - Allow time for understanding and response  - Establish method for patient to ask for assistance (call light)  - Provide an  as needed  - Communicate barriers and strategies to overcome with those who interact with patient  Outcome: Adequate for Discharge

## 2024-04-24 NOTE — PHYSICAL THERAPY NOTE
PHYSICAL THERAPY TREATMENT NOTE - INPATIENT     Room Number: 427/427-A       Presenting Problem: R TSA       Problem List  Active Problems:    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    Patient continues to function below baseline with bed mobility.  Contributing factors to remaining limitations include decreased functional strength, decreased endurance/aerobic capacity, and pain.  Next session anticipate patient to progress bed mobility, transfers, and gait.  Physical Therapy will continue to follow patient for duration of hospitalization.    Patient continues to benefit from continued skilled PT services: to promote return to prior level of function and safety with continuous assistance and gradual rehabilitative therapy .    PLAN  PT Treatment Plan: Bed mobility;Body mechanics;Endurance;Gait training  Frequency (Obs): 5x/week    SUBJECTIVE  Pt reports being ready for PT RX    OBJECTIVE  Precautions: Limb alert - right    WEIGHT BEARING RESTRICTION  R Upper Extremity: Non-Weight Bearing             PAIN ASSESSMENT   Ratin  Location: R shoulder  Management Techniques: Activity promotion;Body mechanics;Relaxation;Repositioning    BALANCE  Static Sitting: Fair +  Dynamic Sitting: Fair  Static Standing: Fair -  Dynamic Standing: Poor +    ACTIVITY TOLERANCE                          O2 WALK       AM-PAC '6-Clicks' INPATIENT SHORT FORM - BASIC MOBILITY  How much difficulty does the patient currently have...  Patient Difficulty: Turning over in bed (including adjusting bedclothes, sheets and blankets)?: A Little   Patient Difficulty: Sitting down on and standing up from a chair with arms (e.g., wheelchair, bedside commode, etc.): A Lot   Patient Difficulty: Moving from lying on back to sitting on the side of the bed?: A Little   How much help from another person does  the patient currently need...   Help from Another: Moving to and from a bed to a chair (including a wheelchair)?: A Little   Help from Another: Need to walk in hospital room?: A Little   Help from Another: Climbing 3-5 steps with a railing?: A Lot     AM-PAC Score:  Raw Score: 16   Approx Degree of Impairment: 54.16%   Standardized Score (AM-PAC Scale): 40.78   CMS Modifier (G-Code): CK    FUNCTIONAL ABILITY STATUS  Functional Mobility/Gait Assessment  Gait Assistance: Minimum assistance  Distance (ft): 2 x 50  Assistive Device:  (hemiwalker)  Pattern: Shuffle  Rolling: minimal assist  Supine to Sit: minimal assist  Sit to Supine: minimal assist  Sit to Stand: minimal assist    Additional information: Pt seen daily.Min a fr bed mobility and transfer.Extra time provided to complete task..EOB sitting balance activity with emphasis on core stabilization.Pt educated on deep breathing and relaxation technique.Pt amb 2 x 50 ft with ivette walker and min a.Provided cuing for gait pattern as well as for postural awareness.Gentle balance activity performed to maximize safety with functional mobility.There ex    The patient's Approx Degree of Impairment: 54.16% has been calculated based on documentation in the [facility] '6 clicks' Inpatient Daily Activity Short Form.  Research supports that patients with this level of impairment may benefit from Sub-acute rehabilitation..  Final disposition will be made by interdisciplinary medical team.    THERAPEUTIC EXERCISES  Lower Extremity Ankle pumps  Glut sets  Hip AB/AD  Heel slides  Quad sets     Position Supine       Patient End of Session: Up in chair;Call light within reach;RN aware of session/findings;All patient questions and concerns addressed    CURRENT GOALS     Patient Goal Patient's self-stated goal is: go to rehab   Goal #1 Patient is able to demonstrate supine - sit EOB @ level: supervision     Goal #1   Current Status Min a   Goal #2 Patient is able to demonstrate transfers  Sit to/from Stand at assistance level: supervision with walker - ivette     Goal #2  Current Status Min A with the ivette walker   Goal #3 Patient is able to ambulate 80 feet with assist device: walker - ivette at assistance level: supervision   Goal #3   Current Status 50'x2 with the ivette walker min A    Goal #4    Goal #4   Current Status    Goal #5 Patient to demonstrate independence with home activity/exercise instructions provided to patient in preparation for discharge.   Goal #5   Current Status IN PROGRESS   Goal #6    Goal #6  Current Status      Gait Training: 15 minutes  Therapeutic Activity: 15 minutes  Neuromuscular Re-education:  minutes  Therapeutic Exercise:  minutes  [place] Repositioning:  minutes  Manual Therapy:  minutes  Can add/delete any of these

## 2024-04-24 NOTE — DISCHARGE SUMMARY
General Medicine Discharge Summary     Patient ID:  Mary Petersen  83 year old  12/30/1940    Admit date: 4/19/2024    Discharge date and time: 4/24/2024    Attending Physician: Lashawn March MD     Consults: CONSULT  - HOME HEALTH  IP CONSULT TO CASE MANAGEMENT  IP CONSULT TO RESPIRATORY CARE  IP CONSULT TO HOSPITALIST    Primary Care Physician: Carole Dalal MD     Reason for admission: shoulder pain    Risk For Readmission: low    Discharge Diagnoses: Chronic right shoulder pain, right rotator cuff tear arthropathy  Pre-op testing  See Additional Discharge Diagnoses in Hospital Course    Discharged Condition: good    Follow-up with labs/images appointments: fu with ortho and PCP    Exam  Gen: No acute distress  Pulm: Lungs clear, normal respiratory effort  CV: Heart with regular rate and rhythm  Abd: Abdomen soft,   EXT: no edema     HPI:   Per Dr. MEDEL  History of Present Illness: Patient is a 83 year old female with PMH sig for DVT, afib on eliquis, RA, CAD,JIAN who presented to the hospital for RTSA.Patient was seen and examined post operatively in PACU. She was able to answer questions appropriately and all vitals stable.Was complaining of R shoulder discomfort.Otherwise no CP,SOB, nausea.        Hospital Course:   Patient is a 83 year old female with PMH sig for DVT, afib on eliquis, RA, CAD,JIAN who presented to the hospital for RTSA, postop course uncomplicated, plan for rehab as patient lives alone, cleared by physical therapy, Ortho, vital signs stable, plan for discharge to [facility].  Plan for follow-up with Ortho as directed.     R rotator cuff tear arthropathy  - s/p RTSA ,4/19  - dvt ppx: eliquis  - rest of management per ortho     pAF  Secondary hypercoagulable state  - resume eliquis and metoprolol     DVT  - eliquis     RA  - holding MTX,resume when okay with Ortho and rheumatology  - prednisone resumed     CAD  - resume ASA when okay with ortho  - resume  statin     JIAN  - CPAP       Operative Procedures: Procedure(s) (LRB):  Right reverse total shoulder arthroplasty (Right)     Imaging: No results found.      Disposition: home    Activity: activity as tolerated  Diet: regular diet  Wound Care: as directed  Code Status: Full Code  O2: none    Home Medication Changes:     Med list     Medication List        START taking these medications      ondansetron 4 mg tablet  Commonly known as: Zofran  Take 1 tablet (4 mg total) by mouth every 8 (eight) hours as needed for Nausea.     oxyCODONE 5 MG Tabs  Take 1 tablet (5 mg total) by mouth every 4 (four) hours as needed for Pain.     senna-docusate 8.6-50 MG Tabs  Commonly known as: Senokot-S  Take 2 tablets by mouth every morning.     traMADol 50 MG Tabs  Commonly known as: Ultram  Take 1 tablet (50 mg total) by mouth every 6 (six) hours as needed for Pain.            CONTINUE taking these medications      alendronate 70 MG Tabs  Commonly known as: Fosamax  TAKE 1 TABLET ONE TIME WEEKLY     apixaban 5 MG Tabs  Commonly known as: Eliquis  Take 1 tablet (5 mg total) by mouth 2 (two) times daily.     aspirin 81 MG Tabs     Cranberry Extract 250 MG Tabs     cyanocobalamin 1000 MCG Tabs  Commonly known as: Vitamin B12     ergocalciferol 1.25 MG (30388 UT) Caps  Commonly known as: Vitamin D2  Take 1 capsule (50,000 Units total) by mouth once a week.     EXTRA STRENGTH ACETAMINOPHEN OR     folic acid 1 MG Tabs  Commonly known as: Folvite  TAKE 1 TABLET EVERY DAY     * gabapentin 100 MG Caps  Commonly known as: Neurontin     * gabapentin 400 MG Caps  Commonly known as: Neurontin     methotrexate 2.5 MG Tabs  Commonly known as: Rheumatrex  Take 9 tablets (22.5 mg total) by mouth every 7 days. 10 tabs PO once a week     metoprolol succinate ER 25 MG Tb24  Commonly known as: Toprol XL  TAKE 1/2 TABLET EVERY DAY  (BETA  BLOCKER)     predniSONE 5 MG Tabs  Commonly known as: Deltasone  TAKE 1 TABLET EVERY DAY     rosuvastatin 10 MG  Tabs  Commonly known as: Crestor  Take 1 tablet (10 mg total) by mouth nightly.           * This list has 2 medication(s) that are the same as other medications prescribed for you. Read the directions carefully, and ask your doctor or other care provider to review them with you.                   Where to Get Your Medications        You can get these medications from any pharmacy    Bring a paper prescription for each of these medications  ondansetron 4 mg tablet  oxyCODONE 5 MG Tabs  senna-docusate 8.6-50 MG Tabs  traMADol 50 MG Tabs         FU   Follow-up Information       Lashawn March MD Follow up on 5/2/2024.    Specialties: SURGERY, ORTHOPEDIC, HAND SURGERY  Why: 5/2 at 11am, For suture removal, for x-rays  Contact information:  [address]  [address]LO[address]  [facility] [address]  [phone]               Carole Dalal MD Follow up.    Specialties: Internal Medicine, PEDIATRICS  Why: within 1 week of discharge from rehab  Contact information:  [address]  [address]  [place] [address]  [phone]                             DC instructions:      Other Discharge Instructions:         Please discuss with your surgeon and rheumatologist when to resume your methotrexate.      My goal is to provide excellent care and a positive patient experience.  Please help me to share your experience with others by leaving a review for me on [email or web address], [email or web address], and/or [email or web address].  Thank you for your help.    Go to [email or web address] and search [employer].  Click on write a review.  Sign in to google.  (You will not receive any spam or ads from this site and your email will not be posted).  Click on the stars to give a star rating and leave a comment.     Go to [email or web address] and search [employer].  Click on [employer].  Click on the stars to give a star rating and leave a comment.  Enter your email address (You will not receive any spam or ads from this site and your email will not be posted).   Click the box to confirm that you are a patient and post your review.    Go to [email or web address] and search Lashawn March.  Click on Lashawn March.  Click on the stars to give a star rating and leave a comment.  Enter your email address (You will not receive any spam or ads from this site and your email will not be posted).  Submit your review.    Lashawn March MD      Reverse Total Shoulder Arthroplasty    THE OPERATION:  Your operation was done through an incision in front of your shoulder.  Your shoulder was replaced with metal and plastic.  The tendons in your shoulder were repaired as much as possible.    PAIN:  You will be given a prescription for pain medicine.  Have this filled at your local pharmacy or where your insurance plan has made arrangements.  The pills may be taken every four hours for pain if needed.  Do not drive while you are taking the pain medication.    ACTIVITY:  You should rest your shoulder in the sling and try to limit shoulder motion.  You may bend and straighten your fingers, wrist, and elbow as much as you desire.  Do not raise your arm up or away from your body on your own.  It is safe to write and eat with your operated arm as long as there is no pain.  Do not carry anything heavier than a dinner fork or a pencil with your operated arm.  Do not use your arm to help you get out of a bed or chair.    SLING:  A sling is necessary to support the arm.  Wear the sling as much as possible and always wear it out in public.  When you are home and seated, you may remove the sling and support your arm on a pillow.    ICE:  Apply ice to your shoulder for 1 hour, 4 times a day for 3 days after surgery.  This will help reduce swelling and pain.  After that you may apply ice as much as you like.    WOUNDS:  Your surgery was done through an incision in front of your shoulder.  The stitches will absorb and will not need to be removed.  You may remove the large bandage 2 days after surgery.  The  incision may be sore, swell, and develop bruising over the next several days.  This will go away and no special care is needed.      BATHING:  It is safe to take a shower 2 days after surgery after you remove the large bandage.  To bathe, remove the sling and support your arm by your side.  To wash under your armpit, lean over and let the arm fall away from your body.  DO NOT raise your arm!  You may gently wash the incision with regular soap and water.  Do not scrub.  Your hand and forearm skin may be dry and peel due to the strong disinfectant soap we use at the time of surgery.    FOLLOW-UP:  Call the office to make an appointment to see me in about 2 weeks after your surgery.  If your have a temperature over 101ºF, severe pain, or redness in your shoulder; please contact the office.  You may be asked to come back to the office early.      HX JIAN-CPAP, RA, CAD, DVT, AFib-Eliquis, CAD    Glasses,upper/lower dentures            I reconciled current and discharge medications on day of discharge, discussed changes with patient and noted changes above.       Total Time Coordinating Care: Greater than 30 minutes    Patient had opportunity to ask questions and state understand and agree with therapeutic plan as outlined    Thank You,    Clovis Grayson MD   Hospitalist with [facility]

## 2024-04-24 NOTE — CM/SW NOTE
Message sent to Liza Brown via [employer]MAN inquiring about an update on insurance auth. Per [facility] liaison, auth still pending.    / to remain available for support and/or discharge planning.     Abena Roblero RN, BSN  Nurse   [phone]

## 2024-07-15 NOTE — DISCHARGE INSTRUCTIONS
Please discuss with your surgeon and rheumatologist when to resume your methotrexate.      My goal is to provide excellent care and a positive patient experience.  Please help me to share your experience with others by leaving a review for me on [email or web address], [email or web address], and/or [employer].  Thank you for your help.    Go to [email or web address] and search Lashawn March.  Click on write a review.  Sign in to google.  (You will not receive any spam or ads from this site and your email will not be posted).  Click on the stars to give a star rating and leave a comment.     Go to [email or web address] and search [employer].  Click on [employer].  Click on the stars to give a star rating and leave a comment.  Enter your email address (You will not receive any spam or ads from this site and your email will not be posted).  Click the box to confirm that you are a patient and post your review.    Go to [email or web address].com and search [employer].  Click on [employer].  Click on the stars to give a star rating and leave a comment.  Enter your email address (You will not receive any spam or ads from this site and your email will not be posted).  Submit your review.    Lashawn March MD      Reverse Total Shoulder Arthroplasty    THE OPERATION:  Your operation was done through an incision in front of your shoulder.  Your shoulder was replaced with metal and plastic.  The tendons in your shoulder were repaired as much as possible.    PAIN:  You will be given a prescription for pain medicine.  Have this filled at your local pharmacy or where your insurance plan has made arrangements.  The pills may be taken every four hours for pain if needed.  Do not drive while you are taking the pain medication.    ACTIVITY:  You should rest your shoulder in the sling and try to limit shoulder motion.  You may bend and straighten your fingers, wrist, and elbow as much as you desire.  Do not raise your arm up or away from your body on your  own.  It is safe to write and eat with your operated arm as long as there is no pain.  Do not carry anything heavier than a dinner fork or a pencil with your operated arm.  Do not use your arm to help you get out of a bed or chair.    SLING:  A sling is necessary to support the arm.  Wear the sling as much as possible and always wear it out in public.  When you are home and seated, you may remove the sling and support your arm on a pillow.    ICE:  Apply ice to your shoulder for 1 hour, 4 times a day for 3 days after surgery.  This will help reduce swelling and pain.  After that you may apply ice as much as you like.    WOUNDS:  Your surgery was done through an incision in front of your shoulder.  The stitches will absorb and will not need to be removed.  You may remove the large bandage 2 days after surgery.  The incision may be sore, swell, and develop bruising over the next several days.  This will go away and no special care is needed.      BATHING:  It is safe to take a shower 2 days after surgery after you remove the large bandage.  To bathe, remove the sling and support your arm by your side.  To wash under your armpit, lean over and let the arm fall away from your body.  DO NOT raise your arm!  You may gently wash the incision with regular soap and water.  Do not scrub.  Your hand and forearm skin may be dry and peel due to the strong disinfectant soap we use at the time of surgery.    FOLLOW-UP:  Call the office to make an appointment to see me in about 2 weeks after your surgery.  If your have a temperature over 101ºF, severe pain, or redness in your shoulder; please contact the office.  You may be asked to come back to the office early.      HX JIAN-CPAP, RA, CAD, DVT, AFib-Eliquis, CAD    Glasses,upper/lower dentures       General Sunscreen Counseling: I recommended a broad spectrum sunscreen with a SPF of 30 or higher.  I explained that SPF 30 sunscreens block approximately 97 percent of the sun's harmful rays.  Sunscreens should be applied at least 15 minutes prior to expected sun exposure and then every 2 hours after that as long as sun exposure continues. If swimming or exercising sunscreen should be reapplied every 45 minutes to an hour after getting wet or sweating.  One ounce, or the equivalent of a shot glass full of sunscreen, is adequate to protect the skin not covered by a bathing suit. I also recommended a lip balm with a sunscreen as well. Sun protective clothing can be used in lieu of sunscreen but must be worn the entire time you are exposed to the sun's rays. Detail Level: Detailed

## 2025-04-30 ENCOUNTER — APPOINTMENT (OUTPATIENT)
Dept: GENERAL RADIOLOGY | Facility: HOSPITAL | Age: 85
End: 2025-04-30
Attending: EMERGENCY MEDICINE
Payer: MEDICARE

## 2025-04-30 ENCOUNTER — HOSPITAL ENCOUNTER (OUTPATIENT)
Facility: HOSPITAL | Age: 85
Setting detail: OBSERVATION
LOS: 1 days | Discharge: HOME | End: 2025-05-02
Attending: EMERGENCY MEDICINE | Admitting: INTERNAL MEDICINE
Payer: MEDICARE

## 2025-04-30 DIAGNOSIS — I21.4 NSTEMI (NON-ST ELEVATED MYOCARDIAL INFARCTION) (HCC): ICD-10-CM

## 2025-04-30 DIAGNOSIS — Z79.899 ENCOUNTER FOR LONG-TERM (CURRENT) USE OF HIGH-RISK MEDICATION: ICD-10-CM

## 2025-04-30 DIAGNOSIS — M06.9 RHEUMATOID ARTHRITIS INVOLVING MULTIPLE SITES (HCC): ICD-10-CM

## 2025-04-30 DIAGNOSIS — M85.89 OSTEOPENIA OF MULTIPLE SITES: ICD-10-CM

## 2025-04-30 DIAGNOSIS — J18.9 PNEUMONIA OF LEFT LOWER LOBE DUE TO INFECTIOUS ORGANISM: Primary | ICD-10-CM

## 2025-04-30 LAB — TROPONIN I SERPL HS-MCNC: 19 NG/L (ref ?–34)

## 2025-04-30 PROCEDURE — 96361 HYDRATE IV INFUSION ADD-ON: CPT

## 2025-04-30 PROCEDURE — 99285 EMERGENCY DEPT VISIT HI MDM: CPT

## 2025-04-30 PROCEDURE — 87040 BLOOD CULTURE FOR BACTERIA: CPT | Performed by: EMERGENCY MEDICINE

## 2025-04-30 PROCEDURE — 71045 X-RAY EXAM CHEST 1 VIEW: CPT | Performed by: EMERGENCY MEDICINE

## 2025-04-30 PROCEDURE — 84484 ASSAY OF TROPONIN QUANT: CPT | Performed by: EMERGENCY MEDICINE

## 2025-04-30 PROCEDURE — 36415 COLL VENOUS BLD VENIPUNCTURE: CPT

## 2025-04-30 PROCEDURE — 96365 THER/PROPH/DIAG IV INF INIT: CPT

## 2025-04-30 RX ORDER — DOXYCYCLINE 100 MG/1
100 CAPSULE ORAL ONCE
Status: COMPLETED | OUTPATIENT
Start: 2025-04-30 | End: 2025-04-30

## 2025-04-30 RX ORDER — ONDANSETRON 2 MG/ML
4 INJECTION INTRAMUSCULAR; INTRAVENOUS EVERY 6 HOURS PRN
Status: DISCONTINUED | OUTPATIENT
Start: 2025-04-30 | End: 2025-05-02

## 2025-04-30 RX ORDER — POLYETHYLENE GLYCOL 3350 17 G/17G
17 POWDER, FOR SOLUTION ORAL DAILY PRN
Status: DISCONTINUED | OUTPATIENT
Start: 2025-04-30 | End: 2025-05-02

## 2025-04-30 RX ORDER — GABAPENTIN 100 MG/1
100 CAPSULE ORAL EVERY MORNING
Status: DISCONTINUED | OUTPATIENT
Start: 2025-05-01 | End: 2025-05-02

## 2025-04-30 RX ORDER — BISACODYL 10 MG
10 SUPPOSITORY, RECTAL RECTAL
Status: DISCONTINUED | OUTPATIENT
Start: 2025-04-30 | End: 2025-05-02

## 2025-04-30 RX ORDER — ACETAMINOPHEN 500 MG
500 TABLET ORAL EVERY 4 HOURS PRN
Status: DISCONTINUED | OUTPATIENT
Start: 2025-04-30 | End: 2025-05-02

## 2025-04-30 RX ORDER — METOCLOPRAMIDE HYDROCHLORIDE 5 MG/ML
10 INJECTION INTRAMUSCULAR; INTRAVENOUS EVERY 8 HOURS PRN
Status: DISCONTINUED | OUTPATIENT
Start: 2025-04-30 | End: 2025-05-02

## 2025-04-30 RX ORDER — SENNOSIDES 8.6 MG
17.2 TABLET ORAL NIGHTLY PRN
Status: DISCONTINUED | OUTPATIENT
Start: 2025-04-30 | End: 2025-05-02

## 2025-04-30 RX ORDER — ASPIRIN 81 MG/1
324 TABLET, CHEWABLE ORAL ONCE
Status: COMPLETED | OUTPATIENT
Start: 2025-04-30 | End: 2025-04-30

## 2025-04-30 RX ORDER — SODIUM PHOSPHATE, DIBASIC AND SODIUM PHOSPHATE, MONOBASIC 7; 19 G/230ML; G/230ML
1 ENEMA RECTAL ONCE AS NEEDED
Status: DISCONTINUED | OUTPATIENT
Start: 2025-04-30 | End: 2025-05-02

## 2025-04-30 RX ORDER — DOXYCYCLINE 100 MG/1
100 CAPSULE ORAL 2 TIMES DAILY
Status: DISCONTINUED | OUTPATIENT
Start: 2025-05-01 | End: 2025-05-01

## 2025-04-30 RX ORDER — ROSUVASTATIN CALCIUM 5 MG/1
5 TABLET, COATED ORAL NIGHTLY
Status: DISCONTINUED | OUTPATIENT
Start: 2025-04-30 | End: 2025-05-02

## 2025-04-30 RX ORDER — PREDNISONE 5 MG/1
5 TABLET ORAL DAILY
Status: DISCONTINUED | OUTPATIENT
Start: 2025-05-01 | End: 2025-05-02

## 2025-04-30 RX ORDER — ASPIRIN 81 MG/1
81 TABLET, CHEWABLE ORAL DAILY
Status: DISCONTINUED | OUTPATIENT
Start: 2025-05-01 | End: 2025-05-02

## 2025-05-01 ENCOUNTER — APPOINTMENT (OUTPATIENT)
Dept: CV DIAGNOSTICS | Facility: HOSPITAL | Age: 85
End: 2025-05-01
Attending: INTERNAL MEDICINE
Payer: MEDICARE

## 2025-05-01 LAB
ADENOVIRUS PCR:: NOT DETECTED
ANION GAP SERPL CALC-SCNC: 8 MMOL/L (ref 0–18)
ATRIAL RATE: 54 BPM
B PARAPERT DNA SPEC QL NAA+PROBE: NOT DETECTED
B PERT DNA SPEC QL NAA+PROBE: NOT DETECTED
BUN BLD-MCNC: 21 MG/DL (ref 9–23)
BUN/CREAT SERPL: 27.6 (ref 10–20)
C PNEUM DNA SPEC QL NAA+PROBE: NOT DETECTED
CALCIUM BLD-MCNC: 8.7 MG/DL (ref 8.7–10.4)
CHLORIDE SERPL-SCNC: 110 MMOL/L (ref 98–112)
CO2 SERPL-SCNC: 25 MMOL/L (ref 21–32)
CORONAVIRUS 229E PCR:: NOT DETECTED
CORONAVIRUS HKU1 PCR:: NOT DETECTED
CORONAVIRUS NL63 PCR:: NOT DETECTED
CORONAVIRUS OC43 PCR:: NOT DETECTED
CREAT BLD-MCNC: 0.76 MG/DL (ref 0.55–1.02)
DEPRECATED RDW RBC AUTO: 45.8 FL (ref 35.1–46.3)
EGFRCR SERPLBLD CKD-EPI 2021: 78 ML/MIN/1.73M2 (ref 60–?)
ERYTHROCYTE [DISTWIDTH] IN BLOOD BY AUTOMATED COUNT: 13.2 % (ref 11–15)
FLUAV RNA SPEC QL NAA+PROBE: NOT DETECTED
FLUBV RNA SPEC QL NAA+PROBE: NOT DETECTED
GLUCOSE BLD-MCNC: 82 MG/DL (ref 70–99)
HCT VFR BLD AUTO: 34 % (ref 35–48)
HGB BLD-MCNC: 11.2 G/DL (ref 12–16)
MAGNESIUM SERPL-MCNC: 2.1 MG/DL (ref 1.6–2.6)
MCH RBC QN AUTO: 32.1 PG (ref 26–34)
MCHC RBC AUTO-ENTMCNC: 32.9 G/DL (ref 31–37)
MCV RBC AUTO: 97.4 FL (ref 80–100)
METAPNEUMOVIRUS PCR:: DETECTED
MYCOPLASMA PNEUMONIA PCR:: NOT DETECTED
OSMOLALITY SERPL CALC.SUM OF ELEC: 298 MOSM/KG (ref 275–295)
P AXIS: 72 DEGREES
P-R INTERVAL: 292 MS
PARAINFLUENZA 1 PCR:: NOT DETECTED
PARAINFLUENZA 2 PCR:: NOT DETECTED
PARAINFLUENZA 3 PCR:: NOT DETECTED
PARAINFLUENZA 4 PCR:: NOT DETECTED
PLATELET # BLD AUTO: 194 10(3)UL (ref 150–450)
POTASSIUM SERPL-SCNC: 3.7 MMOL/L (ref 3.5–5.1)
PROCALCITONIN SERPL-MCNC: 0.06 NG/ML (ref ?–0.05)
Q-T INTERVAL: 446 MS
QRS DURATION: 88 MS
QTC CALCULATION (BEZET): 422 MS
R AXIS: -38 DEGREES
RBC # BLD AUTO: 3.49 X10(6)UL (ref 3.8–5.3)
RHINOVIRUS/ENTERO PCR:: NOT DETECTED
RSV RNA SPEC QL NAA+PROBE: NOT DETECTED
SARS-COV-2 RNA NPH QL NAA+NON-PROBE: NOT DETECTED
SODIUM SERPL-SCNC: 143 MMOL/L (ref 136–145)
T AXIS: 21 DEGREES
TROPONIN I SERPL HS-MCNC: 17 NG/L (ref ?–34)
VENTRICULAR RATE: 54 BPM
WBC # BLD AUTO: 5.1 X10(3) UL (ref 4–11)

## 2025-05-01 PROCEDURE — 83735 ASSAY OF MAGNESIUM: CPT | Performed by: INTERNAL MEDICINE

## 2025-05-01 PROCEDURE — 94760 N-INVAS EAR/PLS OXIMETRY 1: CPT

## 2025-05-01 PROCEDURE — 93010 ELECTROCARDIOGRAM REPORT: CPT | Performed by: INTERNAL MEDICINE

## 2025-05-01 PROCEDURE — 93005 ELECTROCARDIOGRAM TRACING: CPT

## 2025-05-01 PROCEDURE — 85027 COMPLETE CBC AUTOMATED: CPT | Performed by: INTERNAL MEDICINE

## 2025-05-01 PROCEDURE — 0202U NFCT DS 22 TRGT SARS-COV-2: CPT | Performed by: NURSE PRACTITIONER

## 2025-05-01 PROCEDURE — 84484 ASSAY OF TROPONIN QUANT: CPT | Performed by: INTERNAL MEDICINE

## 2025-05-01 PROCEDURE — 80048 BASIC METABOLIC PNL TOTAL CA: CPT | Performed by: INTERNAL MEDICINE

## 2025-05-01 PROCEDURE — 84145 PROCALCITONIN (PCT): CPT | Performed by: NURSE PRACTITIONER

## 2025-05-01 PROCEDURE — 93306 TTE W/DOPPLER COMPLETE: CPT | Performed by: INTERNAL MEDICINE

## 2025-05-01 RX ORDER — BENZONATATE 100 MG/1
100 CAPSULE ORAL 3 TIMES DAILY PRN
Status: DISCONTINUED | OUTPATIENT
Start: 2025-05-01 | End: 2025-05-01

## 2025-05-01 RX ORDER — IPRATROPIUM BROMIDE AND ALBUTEROL SULFATE 2.5; .5 MG/3ML; MG/3ML
3 SOLUTION RESPIRATORY (INHALATION) EVERY 6 HOURS PRN
Status: DISCONTINUED | OUTPATIENT
Start: 2025-05-01 | End: 2025-05-02

## 2025-05-01 RX ORDER — BENZONATATE 100 MG/1
100 CAPSULE ORAL 2 TIMES DAILY
Status: DISCONTINUED | OUTPATIENT
Start: 2025-05-01 | End: 2025-05-02

## 2025-05-01 RX ORDER — POTASSIUM CHLORIDE 1500 MG/1
40 TABLET, EXTENDED RELEASE ORAL ONCE
Status: COMPLETED | OUTPATIENT
Start: 2025-05-01 | End: 2025-05-01

## 2025-05-01 NOTE — CONSULTS
[facility] CARDIOLOGY CONSULT      Mary Petersen is a 84 year old female who I assessed as follows:  Chief Complaint   Patient presents with    Chest Pain          REASON FOR CONSULTATION:    elevated troponin            ASSESSMENT/PLAN:     IMPRESSIONS:  Pneumonia  Elevated troponin at [facility], normal in hospital  History DVT  History takotsubo cardiomyopathy  CAD, moderate by cath 2018  HL, on statin  Bradycardia, asymptomatic        PLAN:  Echo  PET CT stress as outpatient  On eliquis for DVT and ??PAF              --------------------------------------------------------------------------------------------------------------------------------    HPI:         Presents with cough and dyspnea almost 2 weeks. Went to [facility] and diagnosed with pneumonia. Found to have minimally elevated troponin. Mild chest tightness with her symptoms. Sent to ER. In ER, troponin normal.     Takotsubo CM in 2018. Moderate CAD at that time. EF has recovered. On eliquis for DVT and ??PAF in the past.             Current Medications[1]   Past Medical History[2]  Past Surgical History[3]  Family History[4]   Social History:  Short Social Hx on File[5]       Medications:  Current Hospital Medications[6]        Allergies  Allergies[7]            REVIEW OF SYSTEMS:   GENERAL HEALTH: no fevers  SKIN: denies any unusual skin lesions or rashes   EARS: no recent changes in hearing  EYE: no recent vision changes  THROAT: denies sore throat  RESPIRATORY: denies cough or shortness of breath with exertion  CARDIOVASCULAR: denies chest pain, syncope, or palpitations  GI: denies abdominal pain, nausea and vomiting  NEURO: denies headaches and focal neurologic symptoms  PSYCH: no recent depression  ENDOCRINE: no change in sleep pattern  HEME: no easy bruising  All other systems reviewed and negative.          EXAM:         TELE:           /83 (BP Location: Radial)   Pulse 68   Temp 96.5 °F (35.8 °C) (Oral)   Resp 20   Ht 5' 8\"  (1.727 m)   Wt 253 lb 12.8 oz (115.1 kg)   SpO2 95%   BMI 38.59 kg/m²   Temp (24hrs), Av.1 °F (36.2 °C), Min:96.5 °F (35.8 °C), Max:97.7 °F (36.5 °C)    Wt Readings from Last 3 Encounters:   25 253 lb 12.8 oz (115.1 kg)   24 247 lb (112 kg)   24 250 lb (113.4 kg)         Intake/Output Summary (Last 24 hours) at 2025 0830  Last data filed at 2025 0748  Gross per 24 hour   Intake 700 ml   Output 0 ml   Net 700 ml         GENERAL: well developed, well nourished, in no apparent distress  SKIN: no rashes  HEENT: atraumatic, normocephalic, throat without erythema  NECK: supple, no bruits  LUNGS: clear to auscultation  CARDIO: RRR without murmur or S3   GI: soft, nontender  EXTREMITIES: no cyanosis, clubbing or edema  NEUROLOGY: alert  PSYCH: cooperative          LABORATORY DATA:               ECG:        ECHO:          Labs:  Recent Labs   Lab 25  0529   GLU 82   BUN 21   CREATSERUM 0.76   CA 8.7      K 3.7      CO2 25.0     No results for input(s): \"TROP\" in the last 168 hours.  Recent Labs   Lab 25  0529   RBC 3.49*   HGB 11.2*   HCT 34.0*   MCV 97.4   MCH 32.1   MCHC 32.9   RDW 13.2   WBC 5.1   .0     Recent Labs   Lab 25   TROPHS 19       Imaging:  XR CHEST AP PORTABLE  (CPT=71045)  Result Date: 2025  CONCLUSION:  1. No acute cardiopulmonary finding.    Dictated by (CST): Adarsh Garcia MD on 2025 at 9:44 PM     Finalized by (CST): Adarsh Garcia MD on 2025 at 9:45 PM                 Phani Calloway MD        [1]   No current outpatient medications on file.   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)    Cardiomyopathy (HCC)    DVT (deep venous thrombosis) (HCC)    Exposure to medical diagnostic  radiation    HYPERLIPIDEMIA    NSTEMI (non-ST elevated myocardial infarction) (HCC)    Obesity    OTHER DISEASES    bursitis    PMR (polymyalgia rheumatica) (HCC)    Pulmonary embolism (HCC)    Rheumatoid arthritis (HCC)    SLEEP APNEA    severe, , O2 ning 85%, AutoPAP 6-20 supplies [facility]    Sleep apnea   [3]   Past Surgical History:  Procedure Laterality Date    Appendectomy  1960    Appendectomy      Cholecystectomy      Colonoscopy  2006    colon polyps    Colonoscopy,biopsy  08/29/2011    Performed by VIBHA LEUNG at [facility] [place]    Hip replacement surgery Right     Hx breast cancer      Knee replacement surgery Bilateral     Lumpectomy left  12/15/2012    Invasive ductal/lobular carcinoma (Invasive mammary 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)    Other surgical history  04/15/2014    Lt shoulder s/p reverse shoulder arthroplasty & open biceps tenodesis    Radiation left  2012    Remv cataract extracap,insert lens Right 07/27/2016    Procedure: RIGHT PHACOEMULSIFICATION OF CATARACT WITH INTRAOCULAR LENS IMPLANT 76333;  Surgeon: Kodak Salazar MD;  Location: [facility]    Total hip replacement Bilateral     Total knee replacement Bilateral    [4]   Family History  Problem Relation Age of Onset    Cancer Mother 70        colon cancer    Clotting Disorder Mother     Other (Other) Mother         unknown type of arthritis    Cancer Maternal Grandfather 70        colon cancer    Other (Other) Sister         colon polyps    Other (rheumatoid arthritis) Sister     Heart Disorder Father     Other (Other) Son         rheumatoid arthritis    Clotting Disorder Brother         DVT/PE in youngest brother    Heart Disorder Brother     Obesity Brother     Diabetes Brother    [5]   Social History  Socioeconomic History    Marital status:    Tobacco Use    Smoking status: Former     Current packs/day: 0.00     Average packs/day: 3.0 packs/day for 25.0 years (75.0 ttl  pk-yrs)     Types: Cigarettes     Start date: 9/3/1955     Quit date: 9/3/1980     Years since quittin.6    Smokeless tobacco: Never   Vaping Use    Vaping status: Never Used   Substance and Sexual Activity    Alcohol use: No     Alcohol/week: 0.0 standard drinks of alcohol    Drug use: No    Sexual activity: Never   Social History Narrative    , 2 children, 2 grandchildren     Social Drivers of Health     Food Insecurity: No Food Insecurity (2025)    NCSS - Food Insecurity     Worried About Running Out of Food in the Last Year: No     Ran Out of Food in the Last Year: No   Transportation Needs: No Transportation Needs (2025)    NCSS - Transportation     Lack of Transportation: No   Housing Stability: Not At Risk (2025)    NCSS - Housing/Utilities     Has Housing: Yes     Worried About Losing Housing: No     Unable to Get Utilities: No   [6]   Current Facility-Administered Medications   Medication Dose Route Frequency    apixaban (Eliquis) tab 5 mg  5 mg Oral BID    aspirin chewable tab 81 mg  81 mg Oral Daily    gabapentin (Neurontin) cap 100 mg  100 mg Oral QAM    gabapentin (Neurontin) cap 400 mg  400 mg Oral Nightly    metoprolol succinate (Toprol XL) partial tablet 12.5 mg  12.5 mg Oral Daily    predniSONE (Deltasone) tab 5 mg  5 mg Oral Daily    rosuvastatin (Crestor) tab 5 mg  5 mg Oral Nightly    acetaminophen (Tylenol Extra Strength) tab 500 mg  500 mg Oral Q4H PRN    melatonin tab 3 mg  3 mg Oral Nightly PRN    polyethylene glycol (PEG 3350) (Miralax) 17 g oral packet 17 g  17 g Oral Daily PRN    sennosides (Senokot) tab 17.2 mg  17.2 mg Oral Nightly PRN    bisacodyl (Dulcolax) 10 MG rectal suppository 10 mg  10 mg Rectal Daily PRN    fleet enema (Fleet) rectal enema 133 mL  1 enema Rectal Once PRN    ondansetron (Zofran) 4 MG/2ML injection 4 mg  4 mg Intravenous Q6H PRN    metoclopramide (Reglan) 5 mg/mL injection 10 mg  10 mg Intravenous Q8H PRN    cefTRIAXone (Rocephin) 2 g  in sodium chloride 0.9% 100 mL IVPB-ADDV  2 g Intravenous Q24H    doxycycline (Vibramycin) cap 100 mg  100 mg Oral BID    guaiFENesin (Robitussin) 100 MG/5 ML oral liquid 100 mg  100 mg Oral Q4H PRN   [7]   Allergies  Allergen Reactions    Diflucan [Fluconazole] ANAPHYLAXIS    Erythromycin Base NAUSEA AND VOMITING    Pcn [Penicillins] RASH     No skin blistering no organ involvement

## 2025-05-01 NOTE — ED PROVIDER NOTES
Patient Seen in: [facility] Emergency Department      History     Chief Complaint   Patient presents with    Chest Pain     Stated Complaint: R/o Pneumonia    Subjective:   HPI    84-year-old female presents with cough.  Patient has had cough for 3 to 4 days worsening after viral URI symptoms last week.  Went to urgent care where she was diagnosed with left lower lobe pneumonia, elevated troponin.  No chest pain, shortness of breath.  History of Present Illness               Objective:     No pertinent past medical history.            No pertinent past surgical history.              No pertinent social history.                              Physical Exam     ED Triage Vitals [04/30/25 2021]   /77   Pulse 111   Resp 18   Temp 97.7 °F (36.5 °C)   Temp src Temporal   SpO2 94 %   O2 Device None (Room air)       Current Vitals:   Vital Signs  BP: 136/72  Pulse: 73  Resp: 18  Temp: 96.5 °F (35.8 °C)  Temp src: Oral  MAP (mmHg): 90    Oxygen Therapy  SpO2: 95 %  O2 Device: None (Room air)        Physical Exam  Vital signs reviewed. Nursing note reviewed.  Constitutional: Well-developed. Well-nourished. In no acute distress  HENT: Mucous membranes moist.   EYES: No scleral icterus or conjunctival injection.  NECK: Full ROM. Supple.   CARDIAC: Normal rate. Normal S1/ S2. 2+ distal pulses. No edema  PULM/CHEST: Clear to auscultation bilaterally. No wheezes  ABD: Soft, non-tender, non-distended.   RECTAL: deferred  Extremities: No obvious deformity  NEURO: Awake, alert, following commands, moving extremities, answering questions.   SKIN: Warm and dry. No rash or lesions.  PSYCH: Normal judgment. Normal affect.      Physical Exam                ED Course     Labs Reviewed   TROPONIN I HIGH SENSITIVITY - Normal   BASIC METABOLIC PANEL (8)   MAGNESIUM   CBC, PLATELET; NO DIFFERENTIAL   TROPONIN I HIGH SENSITIVITY   BLOOD CULTURE   BLOOD CULTURE          Results                                 MDM       Assessment:Patient is a 84 year old female presenting to the ED due to cough, pneumonia, elevated troponin.    Comorbidities/chronic illnesses impacting care: none    History obtained from: patient    External records and previous hospitalization records reviewed and documented below    Consideration of Social Determinants of Health and Impact on Medical Decision Making:  Housing/Transportation/Financial Strain/Access to healthcare/Food insecurity/family or Community support/Language and Literacy/Substance abuse/Mental health concerns/Disabilities     -none    Radiography/Imaging:  XR CHEST AP PORTABLE  (CPT=71045)   Final Result   PROCEDURE: XR CHEST AP PORTABLE  (CPT=71045)   TIME: 8:51 p.m.        COMPARISON: [facility], X CHEST PORTABLE, 9/08/2013, 11:34    AM.       INDICATIONS: Cough.  Assess for pneumonia.       TECHNIQUE:   Single view.         FINDINGS:    CARDIAC/VASC: Mild cardiomegaly. Normal pulmonary vascularity.    Atherosclerotic calcification aorta.    MEDIAST/BETH:   No visible mass or adenopathy.   LUNGS/PLEURA: No consolidation or pleural effusion.   BONES: Multiple old right rib fractures.  Bilateral shoulder prostheses.   OTHER: Negative.                     =====   CONCLUSION:    1. No acute cardiopulmonary finding.               Dictated by (CST): Adarsh Garcia MD on 4/30/2025 at 9:44 PM        Finalized by (CST): Adarsh Garcia MD on 4/30/2025 at 9:45 PM                       ED course  Patient arrives here normotensive, no oxygen requirement.  She does cough quite often, though has clear breath sounds.  Reviewed her records from urgent care where she had left lower lobe pneumonia seen on chest x-ray, mildly positive troponin.  Reviewed her EKG from urgent care, has no ST changes, no T wave inversions, normal sinus rhythm.  Will not repeat here.  No active chest pain.  Will check blood cultures, repeat troponin, chest x-ray here as we cannot visualize their chest x-ray,  likely admission with cardiology consult, give aspirin    Laboratory results above were independently viewed and interpreted as: Normal troponin  Radiology: ordered and independently interpreted as: Chest x-ray negative for obvious infiltrate and left lower lobe    Patient's symptoms do match pneumonia regardless, given antibiotics for community-acquired pneumonia, will plan for admission    Time spent providing Critical Care Services to the patient (excluding time spent performing separately billable procedures): 35 minutes.  Most of the time was spent at the bedside of the patient, reevaluating the patient and vital signs, explaining conditions and results to the patient and/or family, as well as speaking to the PMD and/or Consultant(s).              Medications   apixaban (Eliquis) tab 5 mg (has no administration in time range)   aspirin chewable tab 81 mg (has no administration in time range)   gabapentin (Neurontin) cap 100 mg (has no administration in time range)   gabapentin (Neurontin) cap 400 mg (has no administration in time range)   metoprolol succinate (Toprol XL) partial tablet 12.5 mg (has no administration in time range)   predniSONE (Deltasone) tab 5 mg (has no administration in time range)   rosuvastatin (Crestor) tab 5 mg (has no administration in time range)   acetaminophen (Tylenol Extra Strength) tab 500 mg (has no administration in time range)   melatonin tab 3 mg (has no administration in time range)   polyethylene glycol (PEG 3350) (Miralax) 17 g oral packet 17 g (has no administration in time range)   sennosides (Senokot) tab 17.2 mg (has no administration in time range)   bisacodyl (Dulcolax) 10 MG rectal suppository 10 mg (has no administration in time range)   fleet enema (Fleet) rectal enema 133 mL (has no administration in time range)   ondansetron (Zofran) 4 MG/2ML injection 4 mg (has no administration in time range)   metoclopramide (Reglan) 5 mg/mL injection 10 mg (has no  administration in time range)   cefTRIAXone (Rocephin) 2 g in sodium chloride 0.9% 100 mL IVPB-ADDV (has no administration in time range)   doxycycline (Vibramycin) cap 100 mg (has no administration in time range)   cefTRIAXone (Rocephin) 2 g in sodium chloride 0.9% 100 mL IVPB-ADDV (0 g Intravenous Stopped 4/30/25 2133)   doxycycline (Vibramycin) cap 100 mg (100 mg Oral Given 4/30/25 2059)   sodium chloride 0.9 % IV bolus 500 mL (0 mL Intravenous Stopped 4/30/25 2246)   aspirin chewable tab 324 mg (324 mg Oral Given 4/30/25 2059)             Admission disposition: 4/30/2025  8:47 PM           Medical Decision Making      Disposition and Plan     Clinical Impression:  1. Pneumonia of left lower lobe due to infectious organism    2. NSTEMI (non-ST elevated myocardial infarction) (HCC)         Disposition:  Admit  4/30/2025  8:47 pm    Follow-up:  No follow-up provider specified.  We recommend that you schedule follow up care with a primary care provider within the next three months to obtain basic health screening including reassessment of your blood pressure.      Medications Prescribed:  Current Discharge Medication List          Supplementary Documentation:         Hospital Problems       Present on Admission  Date Reviewed: 4/19/2024          ICD-10-CM Noted POA    * (Principal) Pneumonia of left lower lobe due to infectious organism J18.9 4/30/2025 Unknown    NSTEMI (non-ST elevated myocardial infarction) (HCC) I21.4 4/30/2025 Unknown

## 2025-05-01 NOTE — ED INITIAL ASSESSMENT (HPI)
Pt arrives through triage with       Worsening coughing, sierra and chest pressure. Pt was seen [facility] today and was told she had left lower lobe pna and elevated trop of 19.33.  Denies fevers at home    +thinner      Pt piv on lower right forearm     Hx of dvt,

## 2025-05-01 NOTE — PLAN OF CARE
Problem: Patient Centered Care  Goal: Patient preferences are identified and integrated in the patient's plan of care  Description: Interventions:- What would you like us to know as we care for you? - Provide timely, complete, and accurate information to patient/family- Incorporate patient and family knowledge, values, beliefs, and cultural backgrounds into the planning and delivery of care- Encourage patient/family to participate in care and decision-making at the level they choose- Honor patient and family perspectives and choices  Outcome: Progressing     Problem: Patient/Family Goals  Goal: Patient/Family Long Term Goal  Description: Patient's Long Term Goal: Interventions:- - See additional Care Plan goals for specific interventions  Outcome: Progressing  Goal: Patient/Family Short Term Goal  Description: Patient's Short Term Goal: Interventions: - - See additional Care Plan goals for specific interventions  Outcome: Progressing     Problem: PAIN - ADULT  Goal: Verbalizes/displays adequate comfort level or patient's stated pain goal  Description: INTERVENTIONS:- Encourage pt to monitor pain and request assistance- Assess pain using appropriate pain scale- Administer analgesics based on type and severity of pain and evaluate response- Implement non-pharmacological measures as appropriate and evaluate response- Consider cultural and social influences on pain and pain management- Manage/alleviate anxiety- Utilize distraction and/or relaxation techniques- Monitor for opioid side effects- Notify MD/LIP if interventions unsuccessful or patient reports new pain- Anticipate increased pain with activity and pre-medicate as appropriate  Outcome: Progressing     Problem: CARDIOVASCULAR - ADULT  Goal: Maintains optimal cardiac output and hemodynamic stability  Description: INTERVENTIONS:- Monitor vital signs, rhythm, and trends- Monitor for bleeding, hypotension and signs of decreased cardiac output- Evaluate  effectiveness of vasoactive medications to optimize hemodynamic stability- Monitor arterial and/or venous puncture sites for bleeding and/or hematoma- Assess quality of pulses, skin color and temperature- Assess for signs of decreased coronary artery perfusion - ex. Angina- Evaluate fluid balance, assess for edema, trend weights  Outcome: Progressing

## 2025-05-01 NOTE — CM/SW NOTE
Patient failed inpatient criteria. Second level of review completed and supports observation.  UR committee in agreement.Inpatient UR  Discussed with Dr. Grayson  who approves observation status. Observation status and  MOON  notice explained and  provided  to the patient .Verbal acknowledgement due to isolation .   Order for observation in place.    Sarika DOSS, Utilization Review   Ext [phone]

## 2025-05-01 NOTE — RESPIRATORY THERAPY NOTE
Pt has Dx of JIAN and currently uses CPAP at home. Pt has brought own CPAP to use during this admission.

## 2025-05-01 NOTE — PLAN OF CARE
Problem: Patient Centered Care  Goal: Patient preferences are identified and integrated in the patient's plan of care  Description: Interventions:- What would you like us to know as we care for you? - Provide timely, complete, and accurate information to patient/family- Incorporate patient and family knowledge, values, beliefs, and cultural backgrounds into the planning and delivery of care- Encourage patient/family to participate in care and decision-making at the level they choose- Honor patient and family perspectives and choices  Outcome: Progressing     Problem: Patient/Family Goals  Goal: Patient/Family Long Term Goal  Description: Patient's Long Term Goal: Interventions:- See additional Care Plan goals for specific interventions  Outcome: Progressing  Goal: Patient/Family Short Term Goal  Description: Patient's Short Term Goal: Interventions: - See additional Care Plan goals for specific interventions  Outcome: Progressing     Problem: PAIN - ADULT  Goal: Verbalizes/displays adequate comfort level or patient's stated pain goal  Description: INTERVENTIONS:- Encourage pt to monitor pain and request assistance- Assess pain using appropriate pain scale- Administer analgesics based on type and severity of pain and evaluate response- Implement non-pharmacological measures as appropriate and evaluate response- Consider cultural and social influences on pain and pain management- Manage/alleviate anxiety- Utilize distraction and/or relaxation techniques- Monitor for opioid side effects- Notify MD/LIP if interventions unsuccessful or patient reports new pain- Anticipate increased pain with activity and pre-medicate as appropriate  Outcome: Progressing     Problem: CARDIOVASCULAR - ADULT  Goal: Maintains optimal cardiac output and hemodynamic stability  Description: INTERVENTIONS:- Monitor vital signs, rhythm, and trends- Monitor for bleeding, hypotension and signs of decreased cardiac output- Evaluate effectiveness  of vasoactive medications to optimize hemodynamic stability- Monitor arterial and/or venous puncture sites for bleeding and/or hematoma- Assess quality of pulses, skin color and temperature- Assess for signs of decreased coronary artery perfusion - ex. Angina- Evaluate fluid balance, assess for edema, trend weights  Outcome: Progressing

## 2025-05-01 NOTE — ED QUICK NOTES
Orders for admission, patient is aware of plan and ready to go upstairs. Any questions, please call ED RN Eben at extension [phone].     Patient Covid vaccination status: Fully vaccinated     COVID Test Ordered in ED: None    COVID Suspicion at Admission: N/A    Running Infusions: Medication Infusions[1]     Mental Status/LOC at time of transport: A/O x4    Other pertinent information:   CIWA score: N/A   NIH score:  N/A               [1]

## 2025-05-01 NOTE — H&P
[facility] and [facility], [place]   Hospitalist Team  History and Physical  Admit Date:  4/30/2025    Is this a shared or split note between Advanced Practice Provider and Physician? Yes    ASSESSMENT / PLAN:   84-year-old female with history of breast cancer, DVT, dyslipidemia, PMR, sleep apnea, osteoporosis who was sent over from the [facility] after being seen for complaints of viral symptoms for ~2 week including cough, post nasal drip, chest congestion and chest pain.  Patient found to have metapneumovirus and atypical chest pain likely musculoskeletal see below for details    Metapneumovirus   -afebrile. WBC normal. PCT 0.06  -CXR negative   -blood cx pending  -RVP + metapneumovirus   -on RA  -stop ABX  -support care-  prn cough meds and nebs as needed     Elevated Troponin?  MSK Chest Pain   CAD-moderate   -2020 echo with normal EF  -2021 thallium with normal perfusion and EF  -EKG without acute ischemic changes   -troponin 19 in [facility] but repeat troponin on admission here normal   -echo ordered   -continue asa, statin and toprol  -plans for outpt PET/CT as per cards   -follows with Dr Calloway     Chronic HFimpEF-now normal   Hx takotsubo cardiomyopathy   -echo pending     PMR  -continue prednisone   -hold methotrexate and folic acid for now     Hx DVT  ?PAF  -continue eliquis     HTN  -continue metoprolol    HL  -continue crestor     Hx DVT  -continue eliquis     JIAN  -CPAP    OP  -resume vitamin d and fosamax at discharge    GOC  -sister and son are medical POA  -per pt she is DNR/Select, POLST filled out by me    MA  -ER Visits 2025: 1  -Admissions 2025:1  -Discharge Needs:  -Appointments: [ ] PCP MD ALEM Melara  -lytes in am  -diet-cardiac    Prophy  -SCD  -Eliquis     Dispo  -EDoD 5/2  PCP: Carole Dalal MD       Outpatient records or previous hospital records reviewed.   Further recommendations pending patient's clinical course.  [facility] hospitalist  team to continue to follow patient  while in house  Concerns regarding plan of care were discussed with patient. Patient agrees with plan as detailed above. Discussed plan of care with Dr. Grayson     Note: This chart was prepared using voice recognition software and may contain unintended word substitution errors.     Maddi Schneider RN, NP  [facility] Hospitalist Team   Available via Perfect Serve or Bubble Chat (check Availability)    5/1/2025      HISTORY:   CC:   Chief Complaint   Patient presents with    Chest Pain        PCP: Carole Dalal MD    History of Present Illness:     Patient states she has been dealing with viral symptoms for the last 2 weeks.  She states she has been having cough congestion postnasal drip associated with some shortness of breath and continuous chest pain.  She thought she may be feeling better but then decided that she should get this checked out as she had a friend that recently had pneumonia and then got diagnosed with RSV and then subsequently passed away.  She went to the [facility] and she was told she had pneumonia and an elevated troponin prompting them to send her to [place].    On admission to the ER patient was found to have a normal troponin and negative chest x-ray for pneumonia.  She had no fever or leukocytosis.  Her procalcitonin was 0.06.  Her respiratory viral panel indicated metapneumovirus.    Patient's chest pain is reproducible with palpation.  She does have known coronary disease and a previous history of Takotsubo's cardiomyopathy and she was seen by cardiology and is undergoing echocardiogram.    Review of Systems  12 point systems reviewed, please see HPI for pertinent positives, otherwise negative    OBJECTIVE:  /79 (BP Location: Radial)   Pulse 62   Temp 97.5 °F (36.4 °C) (Oral)   Resp 18   Ht 5' 8\" (1.727 m)   Wt 253 lb 12.8 oz (115.1 kg)   SpO2 95%   BMI 38.59 kg/m²     GENERAL: no apparent distress, overweight  NEUROLOGIC: A/A; Ox3  RESPIRATORY:  normal expansion; non labored, CTA   CARDIOVASCULAR: regular,nl S1 S2  CHEST: pain with palpation   ABDOMEN:  Soft, BS+; non distended, non tender    EXTREMITIES: no LE edema    PMH  Past Medical History[1]     PSH  Past Surgical History[2]     ALL:  Allergies[3]     Home Medications:  Medications Taking[4]    Soc Hx  Social History     Tobacco Use    Smoking status: Former     Current packs/day: 0.00     Average packs/day: 3.0 packs/day for 25.0 years (75.0 ttl pk-yrs)     Types: Cigarettes     Start date: 9/3/1955     Quit date: 9/3/1980     Years since quittin.6    Smokeless tobacco: Never   Substance Use Topics    Alcohol use: No     Alcohol/week: 0.0 standard drinks of alcohol        Fam Hx  Family History[5]      DIAGNOSTIC DATA:   CBC/Chem  Recent Labs   Lab 25  0529   WBC 5.1   HGB 11.2*   MCV 97.4   .0       Recent Labs   Lab 25  0529      K 3.7      CO2 25.0   BUN 21   CREATSERUM 0.76   GLU 82   CA 8.7   MG 2.1       Radiology: XR CHEST AP PORTABLE  (CPT=71045)  Result Date: 2025  CONCLUSION:  1. No acute cardiopulmonary finding.    Dictated by (CST): Adarsh Garcia MD on 2025 at 9:44 PM     Finalized by (CST): Adarsh Garcia MD on 2025 at 9:45 PM              SEE ATTENDING NOTE BELOW      Patient seen and examined independently.  Discussed with APN and agree with note above.    S: Ms. Petersen 84-year-old female with history of breast cancer, DVT, dyslipidemia, PMR, sleep apnea, osteoporosis who was sent over from the [facility] after being seen for complaints of viral symptoms for ~2 week including cough, post nasal drip, chest congestion and chest pain.  Patient found to have metapneumovirus and atypical chest pain likely musculoskeletal, currently feeling better, no Cp, no nausea or vomiting, no HA or vision changes.      Objective:  /79 (BP Location: Radial)   Pulse 62   Temp 97.5 °F (36.4 °C) (Oral)   Resp 18   Ht 5' 8\" (1.727  m)   Wt 253 lb 12.8 oz (115.1 kg)   SpO2 95%   BMI 38.59 kg/m²     Gen: No acute distress, alert and oriented x3  Neck Supple,    Pulm: Lungs occasional wheezing   CV: Heart with regular rate and rhythm,   Abd: Abdomen soft, nontender, nondistended,    MSK:  no clubbing, no cyanosis.  No Lower extremity edema  Skin: no rashes or lesions, well perfused  Psych: mood stable, cooperative  Neuro: no focal deficits    Assessment and Plan    Metapneumovirus   -afebrile. WBC normal. PCT 0.06  -CXR negative   -blood cx pending  -RVP + metapneumovirus   -on RA  -stop ABX  -support care-  prn cough meds and nebs as needed     Elevated Troponin?  MSK Chest Pain   CAD-moderate   -2020 echo with normal EF  -2021 thallium with normal perfusion and EF  -EKG without acute ischemic changes   -troponin 19 in [facility] but repeat troponin on admission here normal   -echo ordered   -continue asa, statin and toprol  -plans for outpt PET/CT as per cards   -follows with Dr Calloway     Chronic HFimpEF-now normal   Hx takotsubo cardiomyopathy   -echo pending     PMR  -continue prednisone   -hold methotrexate and folic acid for now     Hx DVT  ?PAF  -continue eliquis     HTN  -continue metoprolol    HL  -continue crestor     Hx DVT  -continue eliquis     JIAN  -CPAP    Rest as above with above    Clovis Grayson MD          [1]   Past Medical History:   Anesthesia complication    stopped breathing pacu    Arthritis    RA    Atherosclerosis of coronary artery    Breast cancer (HCC)    Invasive ductal/lobular carcinoma  left(Invasive mammary 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)    Cardiomyopathy (HCC)    DVT (deep venous thrombosis) (HCC)    Exposure to medical diagnostic radiation    HYPERLIPIDEMIA    NSTEMI (non-ST elevated myocardial infarction) (HCC)    Obesity    OTHER DISEASES    bursitis    PMR (polymyalgia rheumatica) (HCC)    Pulmonary embolism (HCC)    Rheumatoid arthritis (HCC)    SLEEP APNEA    severe, , O2 ning 85%,  AutoPAP 6-20 supplies [facility]    Sleep apnea   [2]   Past Surgical History:  Procedure Laterality Date    Appendectomy  1960    Appendectomy      Cholecystectomy      Colonoscopy  2006    colon polyps    Colonoscopy,biopsy  08/29/2011    Performed by VIBHA LEUNG at [facility]    Hip replacement surgery Right     Hx breast cancer      Knee replacement surgery Bilateral     Lumpectomy left  12/15/2012    Invasive ductal/lobular carcinoma (Invasive mammary 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)    Other surgical history  04/15/2014    Lt shoulder s/p reverse shoulder arthroplasty & open biceps tenodesis    Radiation left  2012    Remv cataract extracap,insert lens Right 07/27/2016    Procedure: RIGHT PHACOEMULSIFICATION OF CATARACT WITH INTRAOCULAR LENS IMPLANT 27533;  Surgeon: Kodak Salazar MD;  [facility]: [facility]    Total hip replacement Bilateral     Total knee replacement Bilateral    [3]   Allergies  Allergen Reactions    Diflucan [Fluconazole] ANAPHYLAXIS    Erythromycin Base NAUSEA AND VOMITING    Pcn [Penicillins] RASH     No skin blistering no organ involvement   [4]   Outpatient Medications Marked as Taking for the 4/30/25 encounter (Hospital Encounter)   Medication Sig Dispense Refill    gabapentin 100 MG Oral Cap Take 1 capsule (100 mg total) by mouth every morning.      gabapentin 400 MG Oral Cap Take 1 capsule (400 mg total) by mouth nightly.      FOLIC ACID 1 MG Oral Tab TAKE 1 TABLET EVERY DAY 90 tablet 3    METOPROLOL SUCCINATE ER 25 MG Oral Tablet 24 Hr TAKE 1/2 TABLET EVERY DAY  (BETA  BLOCKER) (Patient taking differently: Take 0.5 tablets (12.5 mg total) by mouth in the morning.) 45 tablet 1    PREDNISONE 5 MG Oral Tab TAKE 1 TABLET EVERY DAY 90 tablet 0    rosuvastatin (CRESTOR) 10 MG Oral Tab Take 1 tablet (10 mg total) by mouth nightly. (Patient taking differently: Take 0.5 tablets (5 mg total) by mouth nightly.) 90 tablet 3    apixaban 5 MG Oral  Tab Take 1 tablet (5 mg total) by mouth 2 (two) times daily. (Patient taking differently: Take 1 tablet (5 mg total) by mouth in the morning and 1 tablet (5 mg total) before bedtime. Pt has instructions.) 180 tablet 3    Vitamin B-12 1000 MCG Oral Tab Take 1 tablet (1,000 mcg total) by mouth.      ALENDRONATE 70 MG Oral Tab TAKE 1 TABLET ONE TIME WEEKLY 12 tablet 3    EXTRA STRENGTH ACETAMINOPHEN OR Take by mouth.      ergocalciferol 90077 units Oral Cap Take 1 capsule (50,000 Units total) by mouth once a week. 12 capsule 1    Aspirin 81 MG Oral Tab Take 1 tablet (81 mg total) by mouth in the morning. Patient has instructions.      Cranberry Extract 250 MG Oral Tab Take by mouth in the morning and before bedtime.     [5]   Family History  Problem Relation Age of Onset    Cancer Mother 70        colon cancer    Clotting Disorder Mother     Other (Other) Mother         unknown type of arthritis    Cancer Maternal Grandfather 70        colon cancer    Other (Other) Sister         colon polyps    Other (rheumatoid arthritis) Sister     Heart Disorder Father     Other (Other) Son         rheumatoid arthritis    Clotting Disorder Brother         DVT/PE in youngest brother    Heart Disorder Brother     Obesity Brother     Diabetes Brother

## 2025-05-02 VITALS
HEART RATE: 61 BPM | WEIGHT: 253.81 LBS | TEMPERATURE: 97 F | BODY MASS INDEX: 38.47 KG/M2 | SYSTOLIC BLOOD PRESSURE: 114 MMHG | HEIGHT: 68 IN | DIASTOLIC BLOOD PRESSURE: 68 MMHG | RESPIRATION RATE: 18 BRPM | OXYGEN SATURATION: 96 %

## 2025-05-02 PROBLEM — I21.4 NSTEMI (NON-ST ELEVATED MYOCARDIAL INFARCTION) (HCC): Status: RESOLVED | Noted: 2025-04-30 | Resolved: 2025-05-02

## 2025-05-02 PROBLEM — B34.8 INFECTION DUE TO HUMAN METAPNEUMOVIRUS (HMPV): Status: ACTIVE | Noted: 2025-05-02

## 2025-05-02 PROBLEM — J18.9 PNEUMONIA OF LEFT LOWER LOBE DUE TO INFECTIOUS ORGANISM: Status: RESOLVED | Noted: 2025-04-30 | Resolved: 2025-05-02

## 2025-05-02 LAB
ANION GAP SERPL CALC-SCNC: 7 MMOL/L (ref 0–18)
BASOPHILS # BLD AUTO: 0.03 X10(3) UL (ref 0–0.2)
BASOPHILS NFR BLD AUTO: 0.5 %
BUN BLD-MCNC: 23 MG/DL (ref 9–23)
BUN/CREAT SERPL: 27.4 (ref 10–20)
CALCIUM BLD-MCNC: 9.3 MG/DL (ref 8.7–10.4)
CHLORIDE SERPL-SCNC: 108 MMOL/L (ref 98–112)
CO2 SERPL-SCNC: 25 MMOL/L (ref 21–32)
CREAT BLD-MCNC: 0.84 MG/DL (ref 0.55–1.02)
DEPRECATED RDW RBC AUTO: 46.3 FL (ref 35.1–46.3)
EGFRCR SERPLBLD CKD-EPI 2021: 69 ML/MIN/1.73M2 (ref 60–?)
EOSINOPHIL # BLD AUTO: 0.55 X10(3) UL (ref 0–0.7)
EOSINOPHIL NFR BLD AUTO: 9.2 %
ERYTHROCYTE [DISTWIDTH] IN BLOOD BY AUTOMATED COUNT: 13.1 % (ref 11–15)
GLUCOSE BLD-MCNC: 76 MG/DL (ref 70–99)
HCT VFR BLD AUTO: 35.3 % (ref 35–48)
HGB BLD-MCNC: 11.5 G/DL (ref 12–16)
IMM GRANULOCYTES # BLD AUTO: 0.04 X10(3) UL (ref 0–1)
IMM GRANULOCYTES NFR BLD: 0.7 %
LYMPHOCYTES # BLD AUTO: 0.9 X10(3) UL (ref 1–4)
LYMPHOCYTES NFR BLD AUTO: 15 %
MCH RBC QN AUTO: 32.2 PG (ref 26–34)
MCHC RBC AUTO-ENTMCNC: 32.6 G/DL (ref 31–37)
MCV RBC AUTO: 98.9 FL (ref 80–100)
MONOCYTES # BLD AUTO: 0.61 X10(3) UL (ref 0.1–1)
MONOCYTES NFR BLD AUTO: 10.1 %
NEUTROPHILS # BLD AUTO: 3.88 X10 (3) UL (ref 1.5–7.7)
NEUTROPHILS # BLD AUTO: 3.88 X10(3) UL (ref 1.5–7.7)
NEUTROPHILS NFR BLD AUTO: 64.5 %
OSMOLALITY SERPL CALC.SUM OF ELEC: 292 MOSM/KG (ref 275–295)
PLATELET # BLD AUTO: 206 10(3)UL (ref 150–450)
POTASSIUM SERPL-SCNC: 4 MMOL/L (ref 3.5–5.1)
POTASSIUM SERPL-SCNC: 4 MMOL/L (ref 3.5–5.1)
RBC # BLD AUTO: 3.57 X10(6)UL (ref 3.8–5.3)
SODIUM SERPL-SCNC: 140 MMOL/L (ref 136–145)
WBC # BLD AUTO: 6 X10(3) UL (ref 4–11)

## 2025-05-02 PROCEDURE — 80048 BASIC METABOLIC PNL TOTAL CA: CPT | Performed by: HOSPITALIST

## 2025-05-02 PROCEDURE — 85025 COMPLETE CBC W/AUTO DIFF WBC: CPT | Performed by: HOSPITALIST

## 2025-05-02 PROCEDURE — 94760 N-INVAS EAR/PLS OXIMETRY 1: CPT

## 2025-05-02 PROCEDURE — 84132 ASSAY OF SERUM POTASSIUM: CPT | Performed by: HOSPITALIST

## 2025-05-02 RX ORDER — BENZONATATE 100 MG/1
100 CAPSULE ORAL 2 TIMES DAILY
Qty: 30 CAPSULE | Refills: 0 | Status: SHIPPED | OUTPATIENT
Start: 2025-05-02 | End: 2025-05-02

## 2025-05-02 RX ORDER — BENZONATATE 100 MG/1
100 CAPSULE ORAL 3 TIMES DAILY PRN
Qty: 30 CAPSULE | Refills: 0 | Status: SHIPPED | OUTPATIENT
Start: 2025-05-02

## 2025-05-02 RX ORDER — ROSUVASTATIN CALCIUM 10 MG/1
5 TABLET, COATED ORAL NIGHTLY
Status: SHIPPED | COMMUNITY
Start: 2025-05-02

## 2025-05-02 NOTE — PROGRESS NOTES
[facility] CARDIOLOGY Progress Note      Mary Petersen is a 84 year old female who I assessed as follows:  Chief Complaint   Patient presents with    Chest Pain          REASON FOR CONSULTATION:    elevated troponin            ASSESSMENT/PLAN:     IMPRESSIONS:  Pneumonia  Elevated troponin at [facility], normal in hospital  History DVT  History takotsubo cardiomyopathy; normal EF on echo  CAD, moderate by cath 2018  HL, on statin  Bradycardia, asymptomatic        PLAN:  Echo reviewed  PET CT stress as outpatient  On eliquis for DVT and ??PAF  Home when ok with others          SUBJECTIVE:    Dyspnea much better. No chest pain.           --------------------------------------------------------------------------------------------------------------------------------    HPI:         Presents with cough and dyspnea almost 2 weeks. Went to [facility] and diagnosed with pneumonia. Found to have minimally elevated troponin. Mild chest tightness with her symptoms. Sent to ER. In ER, troponin normal.     Takotsubo CM in 2018. Moderate CAD at that time. EF has recovered. On eliquis for DVT and ??PAF in the past.             Current Medications[1]   Past Medical History[2]  Past Surgical History[3]  Family History[4]   Social History:  Short Social Hx on File[5]       Medications:  Current Hospital Medications[6]        Allergies  Allergies[7]            REVIEW OF SYSTEMS:   GENERAL HEALTH: no fevers  SKIN: denies any unusual skin lesions or rashes   EARS: no recent changes in hearing  EYE: no recent vision changes  THROAT: denies sore throat  RESPIRATORY: denies cough or shortness of breath with exertion  CARDIOVASCULAR: denies chest pain, syncope, or palpitations  GI: denies abdominal pain, nausea and vomiting  NEURO: denies headaches and focal neurologic symptoms  PSYCH: no recent depression  ENDOCRINE: no change in sleep pattern  HEME: no easy bruising  All other systems reviewed and negative.          EXAM:          TELE: SR          /68 (BP Location: Radial)   Pulse 61   Temp 96.8 °F (36 °C) (Oral)   Resp 18   Ht 5' 8\" (1.727 m)   Wt 253 lb 12.8 oz (115.1 kg)   SpO2 94%   BMI 38.59 kg/m²   Temp (24hrs), Av.1 °F (36.2 °C), Min:96.5 °F (35.8 °C), Max:97.7 °F (36.5 °C)    Wt Readings from Last 3 Encounters:   25 253 lb 12.8 oz (115.1 kg)   24 247 lb (112 kg)   24 250 lb (113.4 kg)         Intake/Output Summary (Last 24 hours) at 2025 0923  Last data filed at 2025 0500  Gross per 24 hour   Intake 790 ml   Output 100 ml   Net 690 ml         GENERAL: well developed, well nourished, in no apparent distress  SKIN: no rashes  HEENT: atraumatic, normocephalic, throat without erythema  NECK: supple, no bruits  LUNGS: clear to auscultation  CARDIO: RRR without murmur or S3   GI: soft, nontender  EXTREMITIES: no cyanosis, clubbing or edema  NEUROLOGY: alert  PSYCH: cooperative          LABORATORY DATA:               ECG:        ECHO:  1. Left ventricle: The cavity size was normal. Wall thickness was mildly      increased. Systolic function was normal. The estimated ejection fraction      was 55-60%, by biplane method of disks. No diagnostic evidence for      regional wall motion abnormalities. Doppler parameters are consistent      with abnormal left ventricular relaxation - grade 1 diastolic      dysfunction.   2. Right ventricle: Systolic function was reduced.   3. Left atrium: The atrium was mildly enlarged.   4. Aortic root: The aortic root was dilated and 3.9cm diameter.   5. Ascending aorta: The ascending aorta was dilated and 4.1cm diameter.   6. Tricuspid valve: There was mild-moderate regurgitation.   7. Pulmonary arteries: Systolic pressure was mildly increased, estimated to      be 41mm Hg.         Labs:  Recent Labs   Lab 25  0529 25  0509   GLU 82 76   BUN 21 23   CREATSERUM 0.76 0.84   CA 8.7 9.3    140   K 3.7 4.0  4.0    108   CO2 25.0 25.0     No  results for input(s): \"TROP\" in the last 168 hours.  Recent Labs   Lab 05/02/25  0509   RBC 3.57*   HGB 11.5*   HCT 35.3   MCV 98.9   MCH 32.2   MCHC 32.6   RDW 13.1   NEPRELIM 3.88   WBC 6.0   .0     Recent Labs   Lab 04/30/25 2057 05/01/25  1423   TROPHS 19 17       Imaging:  XR CHEST AP PORTABLE  (CPT=71045)  Result Date: 4/30/2025  CONCLUSION:  1. No acute cardiopulmonary finding.    Dictated by (CST): Adarsh Garcia MD on 4/30/2025 at 9:44 PM     Finalized by (CST): Adarsh Garcia MD on 4/30/2025 at 9:45 PM                 Phani Calloway MD        [1]   No current outpatient medications on file.   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)    Cardiomyopathy (HCC)    DVT (deep venous thrombosis) (HCC)    Exposure to medical diagnostic radiation    HYPERLIPIDEMIA    NSTEMI (non-ST elevated myocardial infarction) (HCC)    Obesity    OTHER DISEASES    bursitis    PMR (polymyalgia rheumatica) (HCC)    Pulmonary embolism (HCC)    Rheumatoid arthritis (HCC)    SLEEP APNEA    severe, , O2 ning 85%, AutoPAP 6-20 [facility]    Sleep apnea   [3]   Past Surgical History:  Procedure Laterality Date    Appendectomy  1960    Appendectomy      Cholecystectomy      Colonoscopy  2006    colon polyps    Colonoscopy,biopsy  08/29/2011    Performed by VIBHA LEUNG at [facility] [place]    Hip replacement surgery Right     Hx breast cancer      Knee replacement surgery Bilateral     Lumpectomy left  12/15/2012    Invasive ductal/lobular carcinoma (Invasive mammary 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)    Other surgical history  04/15/2014    Lt shoulder s/p reverse shoulder arthroplasty & open biceps tenodesis    Radiation left  2012    Remv cataract  extracap,insert lens Right 2016    Procedure: RIGHT PHACOEMULSIFICATION OF CATARACT WITH INTRAOCULAR LENS IMPLANT 35590;  Surgeon: Kodak Salazar MD;  [facility]    Total hip replacement Bilateral     Total knee replacement Bilateral    [4]   Family History  Problem Relation Age of Onset    Cancer Mother 70        colon cancer    Clotting Disorder Mother     Other (Other) Mother         unknown type of arthritis    Cancer Maternal Grandfather 70        colon cancer    Other (Other) Sister         colon polyps    Other (rheumatoid arthritis) Sister     Heart Disorder Father     Other (Other) Son         rheumatoid arthritis    Clotting Disorder Brother         DVT/PE in youngest brother    Heart Disorder Brother     Obesity Brother     Diabetes Brother    [5]   Social History  Socioeconomic History    Marital status:    Tobacco Use    Smoking status: Former     Current packs/day: 0.00     Average packs/day: 3.0 packs/day for 25.0 years (75.0 ttl pk-yrs)     Types: Cigarettes     Start date: 9/3/1955     Quit date: 9/3/1980     Years since quittin.6    Smokeless tobacco: Never   Vaping Use    Vaping status: Never Used   Substance and Sexual Activity    Alcohol use: No     Alcohol/week: 0.0 standard drinks of alcohol    Drug use: No    Sexual activity: Never   Social History Narrative    , 2 children, 2 grandchildren     Social Drivers of Health     Food Insecurity: No Food Insecurity (2025)    NCSS - Food Insecurity     Worried About Running Out of Food in the Last Year: No     Ran Out of Food in the Last Year: No   Transportation Needs: No Transportation Needs (2025)    NCSS - Transportation     Lack of Transportation: No   Housing Stability: Not At Risk (2025)    NCSS - Housing/Utilities     Has Housing: Yes     Worried About Losing Housing: No     Unable to Get Utilities: No   [6]   Current Facility-Administered Medications   Medication Dose Route  Frequency    ipratropium-albuterol (Duoneb) 0.5-2.5 (3) MG/3ML inhalation solution 3 mL  3 mL Nebulization Q6H PRN    benzonatate (Tessalon) cap 100 mg  100 mg Oral BID    apixaban (Eliquis) tab 5 mg  5 mg Oral BID    aspirin chewable tab 81 mg  81 mg Oral Daily    gabapentin (Neurontin) cap 100 mg  100 mg Oral QAM    gabapentin (Neurontin) cap 400 mg  400 mg Oral Nightly    metoprolol succinate (Toprol XL) partial tablet 12.5 mg  12.5 mg Oral Daily    predniSONE (Deltasone) tab 5 mg  5 mg Oral Daily    rosuvastatin (Crestor) tab 5 mg  5 mg Oral Nightly    acetaminophen (Tylenol Extra Strength) tab 500 mg  500 mg Oral Q4H PRN    melatonin tab 3 mg  3 mg Oral Nightly PRN    polyethylene glycol (PEG 3350) (Miralax) 17 g oral packet 17 g  17 g Oral Daily PRN    sennosides (Senokot) tab 17.2 mg  17.2 mg Oral Nightly PRN    bisacodyl (Dulcolax) 10 MG rectal suppository 10 mg  10 mg Rectal Daily PRN    fleet enema (Fleet) rectal enema 133 mL  1 enema Rectal Once PRN    ondansetron (Zofran) 4 MG/2ML injection 4 mg  4 mg Intravenous Q6H PRN    metoclopramide (Reglan) 5 mg/mL injection 10 mg  10 mg Intravenous Q8H PRN    guaiFENesin (Robitussin) 100 MG/5 ML oral liquid 100 mg  100 mg Oral Q4H PRN   [7]   Allergies  Allergen Reactions    Diflucan [Fluconazole] ANAPHYLAXIS    Erythromycin Base NAUSEA AND VOMITING    Pcn [Penicillins] RASH     No skin blistering no organ involvement

## 2025-05-02 NOTE — PLAN OF CARE
Problem: Patient Centered Care  Goal: Patient preferences are identified and integrated in the patient's plan of care  Description: Interventions:- What would you like us to know as we care for you? From home alone. - Provide timely, complete, and accurate information to patient/family- Incorporate patient and family knowledge, values, beliefs, and cultural backgrounds into the planning and delivery of care- Encourage patient/family to participate in care and decision-making at the level they choose- Honor patient and family perspectives and choices  Outcome: Progressing     Problem: Patient/Family Goals  Goal: Patient/Family Long Term Goal  Description: Patient's Long Term Goal: Interventions:- - See additional Care Plan goals for specific interventions  Outcome: Progressing  Goal: Patient/Family Short Term Goal  Description: Patient's Short Term Goal: Interventions: - See additional Care Plan goals for specific interventions  Outcome: Progressing     Problem: PAIN - ADULT  Goal: Verbalizes/displays adequate comfort level or patient's stated pain goal  Description: INTERVENTIONS:- Encourage pt to monitor pain and request assistance- Assess pain using appropriate pain scale- Administer analgesics based on type and severity of pain and evaluate response- Implement non-pharmacological measures as appropriate and evaluate response- Consider cultural and social influences on pain and pain management- Manage/alleviate anxiety- Utilize distraction and/or relaxation techniques- Monitor for opioid side effects- Notify MD/LIP if interventions unsuccessful or patient reports new pain- Anticipate increased pain with activity and pre-medicate as appropriate  Outcome: Progressing     Problem: CARDIOVASCULAR - ADULT  Goal: Maintains optimal cardiac output and hemodynamic stability  Description: INTERVENTIONS:- Monitor vital signs, rhythm, and trends- Monitor for bleeding, hypotension and signs of decreased cardiac output-  Evaluate effectiveness of vasoactive medications to optimize hemodynamic stability- Monitor arterial and/or venous puncture sites for bleeding and/or hematoma- Assess quality of pulses, skin color and temperature- Assess for signs of decreased coronary artery perfusion - ex. Angina- Evaluate fluid balance, assess for edema, trend weights  Outcome: Progressing

## 2025-05-02 NOTE — CM/SW NOTE
05/02/25 1200   Discharge disposition   Expected discharge disposition Home or Self   Discharge transportation Private car     Yudith JETERN RN [place] PAYNE  RN Case Manager  [phone]

## 2025-05-02 NOTE — DISCHARGE SUMMARY
[facility] and [facility] Hospitalist Discharge Summary   Patient ID:  Mary Petersen  [id number]  84 year old  12/30/1940    Admit date: 4/30/2025  Discharge date: 5/2/2025    Primary Care Physician: Carole Dalal MD   Attending Physician: Clovis Grayson MD   Consults:   Consultants         Provider   Role Specialty     Chaim Maria MD      Consulting Physician CARDIOLOGY            Hospital Discharge Diagnoses:   Infection due to human metapneumovirus (hMPV)  ----See D/C Summary for further Dx    Hospital Course:     84-year-old female with history of breast cancer, DVT, dyslipidemia, PMR, sleep apnea, osteoporosis who was sent over from the immediate care center after being seen for complaints of viral symptoms for ~2 week including cough, post nasal drip, chest congestion and chest pain.  Patient found to have metapneumovirus will discharge on Tessalon Perles.  Her chest pain is likely musculoskeletal from coughing however she did have an elevated troponin at the immediate care center but repeat troponin here at the hospital was negative.  Echocardiogram demonstrated normal left ventricular systolic function and grade 1 diastolic dysfunction, and dilated ascending aorta and aortic root.  Patient does have known coronary disease and cardiology recommended outpatient PET/CT, which they will arrange.  CODE STATUS was discussed with patient and she opted for DNR/select, POLST was done during her hospitalization. No discharge needs. [facility] coordinator to out reach for PCP appointment next week and cardiology follow-up in 1-2 week    Metapneumovirus   -tessalon pearls prn      Chest pain  CAD-moderate   Dilated Ascending aorta and aortic root  -echo with EF 55 to 60%, grade 1 diastolic dysfunction, dilated aortic root and ascending aorta  -continue asa, statin and toprol  -plans for outpt PET/CT with Dr Calloway    Code Status:  Discussed CODE STATUS with the patient and she elected to be DNR select, POLST form was  completed and asked patient to bring in to her daily visit to be scanned into the medical record system.    Discharge Instructions     Medication List        CONTINUE taking these medications      alendronate 70 MG Tabs  Commonly known as: Fosamax  TAKE 1 TABLET ONE TIME WEEKLY     apixaban 5 MG Tabs  Commonly known as: Eliquis     aspirin 81 MG Tabs     Cranberry Extract 250 MG Tabs     Crestor 10 MG Tabs  Generic drug: rosuvastatin     cyanocobalamin 1000 MCG Tabs  Commonly known as: Vitamin B12     ergocalciferol 1.25 MG (68570 UT) Caps  Commonly known as: Vitamin D2  Take 1 capsule (50,000 Units total) by mouth once a week.     EXTRA STRENGTH ACETAMINOPHEN OR     folic acid 1 MG Tabs  Commonly known as: Folvite  TAKE 1 TABLET EVERY DAY     * gabapentin 100 MG Caps  Commonly known as: Neurontin     * gabapentin 400 MG Caps  Commonly known as: Neurontin     methotrexate 2.5 MG Tabs  Commonly known as: Rheumatrex     metoprolol succinate ER 25 MG Tb24  Commonly known as: Toprol XL  TAKE 1/2 TABLET EVERY DAY  (BETA  BLOCKER)     ondansetron 4 mg tablet  Commonly known as: Zofran  Take 1 tablet (4 mg total) by mouth every 8 (eight) hours as needed for Nausea.     predniSONE 5 MG Tabs  Commonly known as: Deltasone  TAKE 1 TABLET EVERY DAY           * This list has 2 medication(s) that are the same as other medications prescribed for you. Read the directions carefully, and ask your doctor or other care provider to review them with you.                  Important follow up:   Follow-up Information       [facility]. Schedule an appointment as soon as possible for a visit.    Specialty: Multi-Specialty  Contact information:  [address] [place] [address] [address]  [facility] [id number] [phone]  Additional information:  Your appointment is located at [address] [facility] in [place], [place].  Please park in the Yellow lot and go through the [facility] entrance.  Then proceed to [address].      Masks are  optional for all patients and visitors, unless otherwise indicated.             Carole Dalal MD. Schedule an appointment as soon as possible for a visit in 1 week(s).    Specialties: Internal Medicine, PEDIATRICS  Contact information:  [address] [facility]  [address]  [place] [address] [phone]                             EXAM:   GENERAL: no apparent distress, overweight  NEURO: A/A Ox3  RESP: non labored, CTA  CARDIO: Regular, no murmur  ABD: soft, NT, ND, BS+  EXTREMITIES: no edema    Operative Procedures:   Radiology:   XR CHEST AP PORTABLE  (CPT=71045)  Result Date: 4/30/2025  CONCLUSION:  1. No acute cardiopulmonary finding.    Dictated by (CST): Adarsh Garcia MD on 4/30/2025 at 9:44 PM     Finalized by (CST): Adarsh Garcia MD on 4/30/2025 at 9:45 PM          I Reconciled current and discharge medications on day of discharge  Patient had opportunity to ask questions and state understand and agree with therapeutic plan as outlined    Total Time Coordinating Care: greater than 30 minutes  Is this a shared or split note between Advanced Practice Provider and Physician? Yes    Note: This chart was prepared using voice recognition software and may contain unintended word substitution errors.     Maddi Schneider RN, NP   [facility]   5/2/2025      SEE ATTENDING NOTE BELOW      Patient seen and examined independently.  Discussed with APN and agree with note above    Patient improved.  Stable for discharge to Home.      GEN: NAD  RESP: CTAB  CV: RRR    Time of discharge >30 minutes    Clovis Grayson MD

## 2025-05-02 NOTE — DISCHARGE INSTRUCTIONS
You came in with symptoms related to a viral syndrome called human metapneumovirus, this is similar to the common cold and is a self-limiting process.  You can continue taking benzonatate for cough and a prescription was sent to your pharmacy.      If you have any continued issues with your viral syndrome you can be seen at [facility], call for appointment [phone]  Hours:  Monday-Friday 9am-9pm  Saturday 9a-4pm  Sunday 9a-4pm    Our Critical access hospital coordinator will be calling you this afternoon to help facilitate appointments with your primary and cardiology.

## 2025-05-02 NOTE — PLAN OF CARE
Discharge education provided, IV removed at bedside. Pt belongings were gathered and sent with patient.   Problem: Patient Centered Care  Goal: Patient preferences are identified and integrated in the patient's plan of care  Description: Interventions:- What would you like us to know as we care for you? I live at home alone- Provide timely, complete, and accurate information to patient/family- Incorporate patient and family knowledge, values, beliefs, and cultural backgrounds into the planning and delivery of care- Encourage patient/family to participate in care and decision-making at the level they choose- Honor patient and family perspectives and choices  5/2/2025 1121 by Flash Cook RN  Outcome: Adequate for Discharge  5/2/2025 1121 by Flash Cook RN  Outcome: Progressing     Problem: Patient/Family Goals  Goal: Patient/Family Long Term Goal  Description: Patient's Long Term Goal: Discharge. Interventions:- Monitor vital signs, follow MD orders.- See additional Care Plan goals for specific interventions  5/2/2025 1121 by Flash Cook RN  Outcome: Adequate for Discharge  5/2/2025 1121 by Flash Cook RN  Outcome: Progressing  Goal: Patient/Family Short Term Goal  Description: Patient's Short Term Goal: Discharge. Interventions: - Follow MD orders, monitor vital signs- See additional Care Plan goals for specific interventions  5/2/2025 1121 by Flash Cook RN  Outcome: Adequate for Discharge  5/2/2025 1121 by Flash Cook RN  Outcome: Progressing     Problem: PAIN - ADULT  Goal: Verbalizes/displays adequate comfort level or patient's stated pain goal  Description: INTERVENTIONS:- Encourage pt to monitor pain and request assistance- Assess pain using appropriate pain scale- Administer analgesics based on type and severity of pain and evaluate response- Implement non-pharmacological measures as appropriate and evaluate response- Consider cultural and social influences on pain and pain  management- Manage/alleviate anxiety- Utilize distraction and/or relaxation techniques- Monitor for opioid side effects- Notify MD/LIP if interventions unsuccessful or patient reports new pain- Anticipate increased pain with activity and pre-medicate as appropriate  5/2/2025 1121 by Flash Cook, RN  Outcome: Adequate for Discharge  5/2/2025 1121 by Flash Cook, RN  Outcome: Progressing     Problem: CARDIOVASCULAR - ADULT  Goal: Maintains optimal cardiac output and hemodynamic stability  Description: INTERVENTIONS:- Monitor vital signs, rhythm, and trends- Monitor for bleeding, hypotension and signs of decreased cardiac output- Evaluate effectiveness of vasoactive medications to optimize hemodynamic stability- Monitor arterial and/or venous puncture sites for bleeding and/or hematoma- Assess quality of pulses, skin color and temperature- Assess for signs of decreased coronary artery perfusion - ex. Angina- Evaluate fluid balance, assess for edema, trend weights  5/2/2025 1121 by Flash Cook, RN  Outcome: Adequate for Discharge  5/2/2025 1121 by Flash Cook, RN  Outcome: Progressing

## (undated) DEVICE — SOLUTION IRRIG 1000ML 0.9% NACL USP BTL

## (undated) DEVICE — PACK CDS SHOULDER

## (undated) DEVICE — GAMMEX® NON-LATEX PI ORTHO SIZE 7, STERILE POLYISOPRENE POWDER-FREE SURGICAL GLOVE: Brand: GAMMEX

## (undated) DEVICE — GAMMEX® PI HYBRID SIZE 6.5, STERILE POWDER-FREE SURGICAL GLOVE, POLYISOPRENE AND NEOPRENE BLEND: Brand: GAMMEX

## (undated) DEVICE — JELLY LUB 2OZ GREASELESS FLIP TOP DISP

## (undated) DEVICE — SOLUTION IRRIG 3000ML 0.9% NACL FLX CONT

## (undated) DEVICE — BIT DRL 3.2MM PERIPH SCR AEQUALIS REV

## (undated) DEVICE — 3M(TM) MEDIPORE(TM) H SOFT CLOTH TAPE 2866: Brand: 3M™ MEDIPORE™

## (undated) DEVICE — VIOLET BRAIDED (POLYGLACTIN 910), SYNTHETIC ABSORBABLE SUTURE: Brand: COATED VICRYL

## (undated) DEVICE — PACK CDS HYSTEROSCOPY

## (undated) DEVICE — SUT FBRWR 2 38IN N ABSRB BLU L26.5MM 1/2

## (undated) DEVICE — SUT COAT VCRL 0 27IN CP-1 ABSRB UD 36MM 1/2

## (undated) DEVICE — GLOVE GAMMEX PI HYBRID LF 8.5

## (undated) DEVICE — SUT ETHBND XL 1 30IN NABSRB GRN 36MM CT-1 1/2 CIR TAPR

## (undated) DEVICE — GUIDEWIRE ORTH 2.5X220MM DISP AEQUALIS

## (undated) DEVICE — DRAPE,U/ SHT,SPLIT,PLAS,STERIL: Brand: MEDLINE

## (undated) DEVICE — PREMIERPRO LAP SPONGE 18"X18" STERILE, 5/PK: Brand: PREMIERPRO

## (undated) DEVICE — APPLICATOR PREP 26ML CHG 2% ISO ALC 70%

## (undated) DEVICE — BANDAGE COHESIVE 4INX5YD TAN E POR SLF ADH

## (undated) DEVICE — NEEDLE SPNL 22GA L3.5IN QNCKE BVL DURA CLICK

## (undated) DEVICE — GOWN SURG XL DISP LEV 3 AERO BLU RAGLAN SL

## (undated) DEVICE — 3M™ STERI-STRIP™ REINFORCED ADHESIVE SKIN CLOSURES, R1547, 1/2 IN X 4 IN (12 MM X 100 MM), 6 STRIPS/ENVELOPE: Brand: 3M™ STERI-STRIP™

## (undated) DEVICE — GUIDEPIN ORTH 3X100MM PERFORM

## (undated) DEVICE — MEGADYNE 2.75IN NEEDLE MONO

## (undated) DEVICE — HANDPIECE SET WITH HIGH FLOW TIP AND SUCTION TUBE: Brand: INTERPULSE

## (undated) DEVICE — SUT MCRYL 3-0 27IN ABSRB UD L24MM PS-1

## (undated) DEVICE — COVER TBL W60XL90IN STD PUNC RESIST LO

## (undated) DEVICE — MORCELLATOR HYSTEROSCOPIC MINI SFT TISS

## (undated) DEVICE — Device: Brand: STABLECUT®

## (undated) DEVICE — KIT HYSTEROSCOPIC PROC INCL INFLO OUTFLO TB

## (undated) DEVICE — SYRINGE BULB 50/CS 48/PLT: Brand: MEDEGEN MEDICAL PRODUCTS, LLC

## (undated) DEVICE — CANISTER SUCT 3000CC HI FLO DISP FOR FLD MGMT

## (undated) DEVICE — SUT VCRL 2-0 27IN FS-1 ABSRB UD L24MM 3/8 CIR

## (undated) DEVICE — Device: Brand: XPERIENCE

## (undated) DEVICE — SHEET,DRAPE,53X77,STERILE: Brand: MEDLINE

## (undated) NOTE — ED AVS SNAPSHOT
Edgar Mitchell   MRN: [id number]    Department:  [facility] Emergency Department   Date of Visit:  6/25/2019           Disclosure     Insurance plans vary and the physician(s) referred by the ER may not be covered by your plan.  Please co within the next three months to obtain basic health screening including reassessment of your blood pressure.     IF THERE IS ANY CHANGE OR WORSENING OF YOUR CONDITION, CALL YOUR PRIMARY CARE PHYSICIAN AT ONCE OR RETURN IMMEDIATELY TO THE EMERGENCY DEPARTMEN

## (undated) NOTE — IP AVS SNAPSHOT
Patient Demographics     Address  [address] [place] [address] Phone  [phone] (Home)  [phone] (Mobile) *Preferred* E-mail Address  [email or web address]      Patient Contacts     Name Relation Home Work Mobile    Billy Petersen   [phone]      Allergies as of 4/24/2024  Review status set to Review Complete on 4/19/2024       Noted Reaction Type Reactions    Diflucan [fluconazole] 12/01/2010   Systemic ANAPHYLAXIS    Erythromycin Base 09/03/2008    NAUSEA AND VOMITING    Pcn [penicillins] 12/10/2007    RASH    No skin blistering no organ involvement      Code Status Information     Code Status    Full Code        Patient Instructions       My goal is to provide excellent care and a positive patient experience.  Please help me to share your experience with others by leaving a review for me on [email or web address], [email or web address], and/or [employer] [facility].  Thank you for your help.    Go to google.com and search Lashawn March.  Click on write a review.  Sign in to google.  (You will not receive any spam or ads from this site and your email will not be posted).  Click on the stars to give a star rating and leave a comment.     Go to healthgrades.com and search [employer].  Click on Lashawn [employer].  Click on the stars to give a star rating and leave a comment.  Enter your email address (You will not receive any spam or ads from this site and your email will not be posted).  Click the box to confirm that you are a patient and post your review.    Go to vitals.com and search [employer].  Click on [employer].  Click on the stars to give a star rating and leave a comment.  Enter your email address (You will not receive any spam or ads from this site and your email will not be posted).  Submit your review.    Lashawn March MD      Reverse Total Shoulder Arthroplasty    THE OPERATION:  Your operation was done through an incision in front of your shoulder.  Your shoulder was replaced  with metal and plastic.  The tendons in your shoulder were repaired as much as possible.    PAIN:  You will be given a prescription for pain medicine.  Have this filled at your local pharmacy or where your insurance plan has made arrangements.  The pills may be taken every four hours for pain if needed.  Do not drive while you are taking the pain medication.    ACTIVITY:  You should rest your shoulder in the sling and try to limit shoulder motion.  You may bend and straighten your fingers, wrist, and elbow as much as you desire.  Do not raise your arm up or away from your body on your own.  It is safe to write and eat with your operated arm as long as there is no pain.  Do not carry anything heavier than a dinner fork or a pencil with your operated arm.  Do not use your arm to help you get out of a bed or chair.    SLING:  A sling is necessary to support the arm.  Wear the sling as much as possible and always wear it out in public.  When you are home and seated, you may remove the sling and support your arm on a pillow.    ICE:  Apply ice to your shoulder for 1 hour, 4 times a day for 3 days after surgery.  This will help reduce swelling and pain.  After that you may apply ice as much as you like.    WOUNDS:  Your surgery was done through an incision in front of your shoulder.  The stitches will absorb and will not need to be removed.  You may remove the large bandage 2 days after surgery.  The incision may be sore, swell, and develop bruising over the next several days.  This will go away and no special care is needed.      BATHING:  It is safe to take a shower 2 days after surgery after you remove the large bandage.  To bathe, remove the sling and support your arm by your side.  To wash under your armpit, lean over and let the arm fall away from your body.  DO NOT raise your arm!  You may gently wash the incision with regular soap and water.  Do not scrub.  Your hand and forearm skin may be dry and peel due to the  strong disinfectant soap we use at the time of surgery.    FOLLOW-UP:  Call the office to make an appointment to see me in about 2 weeks after your surgery.  If your have a temperature over 101ºF, severe pain, or redness in your shoulder; please contact the office.  You may be asked to come back to the office early.      HX JIAN-CPAP, RA, CAD, DVT, AFib-Eliquis, CAD    Glasses,upper/lower dentures         Follow-up Information     Lashawn March MD Follow up on 5/2/2024.    Specialties: SURGERY, ORTHOPEDIC, HAND SURGERY  Why: 5/2 at 11am, For suture removal, for x-rays  Contact information:  [address] [facility]  2ND FLOOR  [facility] [id number] [phone]             Carole Dalal MD Follow up.    Specialties: Internal Medicine, PEDIATRICS  Why: within 1 week of discharge from rehab  Contact information:  [address] [facility]  [address]  [place] [id number] [phone]                        Your Home Meds List      TAKE these medications       Instructions Authorizing Provider Morning Afternoon Evening As Needed   alendronate 70 MG Tabs  Commonly known as: Fosamax  Next dose due: As directed      TAKE 1 TABLET ONE TIME WEEKLY   Pin Ирина         apixaban 5 MG Tabs  Commonly known as: Eliquis  Next dose due: TONIGHT       Take 1 tablet (5 mg total) by mouth 2 (two) times daily.   Carole Dalal         aspirin 81 MG Tabs  Next dose due: TOMORROW MORNING      Take 1 tablet (81 mg total) by mouth daily. Patient has instructions          Cranberry Extract 250 MG Tabs  Next dose due: TOMORROW MORNING      Take  by mouth 2 (two) times daily.          cyanocobalamin 1000 MCG Tabs  Commonly known as: Vitamin B12  Next dose due: TOMORROW MORNING      Take 1 tablet (1,000 mcg total) by mouth.          ergocalciferol 1.25 MG (93097 UT) Caps  Commonly known as: Vitamin D2  Next dose due: As directed      Take 1 capsule (50,000 Units total) by mouth once a week.   Pin Ирина         EXTRA STRENGTH ACETAMINOPHEN OR      Take by  mouth.          folic acid 1 MG Tabs  Commonly known as: Folvite  Next dose due: TOMORROW MORNING      TAKE 1 TABLET EVERY DAY   Pin Ирина         gabapentin 100 MG Caps  Commonly known as: Neurontin  Next dose due: TOMORROW MORNING      Take 1 capsule (100 mg total) by mouth every morning.          gabapentin 400 MG Caps  Commonly known as: Neurontin  Next dose due: TONIGHT       Take 1 capsule (400 mg total) by mouth nightly.          methotrexate 2.5 MG Tabs  Commonly known as: Rheumatrex  Next dose due: As directed      Take 9 tablets (22.5 mg total) by mouth every 7 days. 10 tabs PO once a week   Pin Ирина         metoprolol succinate ER 25 MG Tb24  Commonly known as: Toprol XL  Next dose due: TOMORROW MORNING      TAKE 1/2 TABLET EVERY DAY  (BETA  BLOCKER)   Carole Dalal         ondansetron 4 mg tablet  Commonly known as: Zofran  Next dose due: As needed      Take 1 tablet (4 mg total) by mouth every 8 (eight) hours as needed for Nausea.   Asha Nieves         oxyCODONE 5 MG Tabs  Next dose due: As needed      Take 1 tablet (5 mg total) by mouth every 4 (four) hours as needed for Pain.   Asha Nieves         predniSONE 5 MG Tabs  Commonly known as: Deltasone  Next dose due: TOMORROW MORNING      TAKE 1 TABLET EVERY DAY   Pin Ирина         rosuvastatin 10 MG Tabs  Commonly known as: Crestor  Next dose due: TONIGHT       Take 1 tablet (10 mg total) by mouth nightly.   Carole Dalal         senna-docusate 8.6-50 MG Tabs  Commonly known as: Senokot-S  Next dose due: TOMORROW MORNING      Take 2 tablets by mouth every morning.   Asha Nieves         traMADol 50 MG Tabs  Commonly known as: Ultram  Next dose due: As needed      Take 1 tablet (50 mg total) by mouth every 6 (six) hours as needed for Pain.   Asha Nieves               Where to Get Your Medications      Please  your prescriptions at the location directed by your doctor or nurse    Bring a paper prescription for each of these  medications  ondansetron 4 mg tablet  oxyCODONE 5 MG Tabs  senna-docusate 8.6-50 MG Tabs  traMADol 50 MG Tabs           427-427-A - MAR ACTION REPORT  (last 48 hrs)    ** SITE UNKNOWN **     Order ID Medication Name Action Time Action Reason Comments    [id number] acetaminophen (Tylenol) tab 650 mg 04/22/24 1351 Given      [id number] acetaminophen (Tylenol) tab 650 mg 04/22/24 1806 Given      [id number] acetaminophen (Tylenol) tab 650 mg 04/22/24 2111 Given      [id number] acetaminophen (Tylenol) tab 650 mg 04/23/24 0534 Given      [id number] acetaminophen (Tylenol) tab 650 mg 04/23/24 1026 Given      [id number] acetaminophen (Tylenol) tab 650 mg 04/23/24 1400 Given      [id number] acetaminophen (Tylenol) tab 650 mg 04/23/24 1751 Given      [id number] acetaminophen (Tylenol) tab 650 mg 04/23/24 2115 Given      [id number] acetaminophen (Tylenol) tab 650 mg 04/24/24 0623 Given      [id number] acetaminophen (Tylenol) tab 650 mg 04/24/24 1036 Given      [id number] apixaban (Eliquis) tab 5 mg 04/22/24 1806 Given      [id number] apixaban (Eliquis) tab 5 mg 04/23/24 0534 Given      [id number] apixaban (Eliquis) tab 5 mg 04/23/24 1751 Given      [id number] apixaban (Eliquis) tab 5 mg 04/24/24 0623 Given      [id number] docusate sodium (Colace) cap 100 mg 04/22/24 2110 Given      [id number] docusate sodium (Colace) cap 100 mg 04/23/24 0842 Given      [id number] docusate sodium (Colace) cap 100 mg 04/23/24 2115 Given      [id number] docusate sodium (Colace) cap 100 mg 04/24/24 0925 Given      [id number] folic acid (Folvite) tab 1 mg 04/23/24 0842 Given      [id number] folic acid (Folvite) tab 1 mg 04/24/24 0925 Given      [id number] gabapentin (Neurontin) cap 100 mg 04/23/24 0842 Given      [id number] gabapentin (Neurontin) cap 100 mg 04/24/24 0925 Given      [id number] gabapentin (Neurontin) cap 400 mg 04/22/24 2111 Given      [id number] gabapentin (Neurontin) cap 400 mg 04/23/24 2115 Given      [id number] metoprolol succinate (Toprol XL) partial  tablet 12.5 mg 04/23/24 0842 Given      [id number] metoprolol succinate (Toprol XL) partial tablet 12.5 mg 04/24/24 0925 Given      [id number] oxyCODONE immediate release tab 5 mg (Or Linked Group #1) 04/23/24 0534 Given      [id number] oxyCODONE immediate release tab 5 mg 04/24/24 0352 Given      [id number] polyethylene glycol (PEG 3350) (Miralax) 17 g oral packet 17 g 04/24/24 0926 Given      [id number] predniSONE (Deltasone) tab 5 mg 04/23/24 0842 Given      [id number] predniSONE (Deltasone) tab 5 mg 04/24/24 0925 Given      [id number] rosuvastatin (Crestor) tab 5 mg 04/22/24 2111 Given      [id number] rosuvastatin (Crestor) tab 5 mg 04/23/24 2115 Given      [id number] sennosides (Senokot) tab 17.2 mg 04/22/24 2110 Given      [id number] sennosides (Senokot) tab 17.2 mg 04/23/24 2114 Given              Recent Vital Signs    Flowsheet Row Most Recent Value   /75 Filed at 04/24/2024 0749   Pulse 71 Filed at 04/24/2024 0749   Resp 16 Filed at 04/24/2024 0749   Temp 97.8 °F (36.6 °C) Filed at 04/24/2024 0749   SpO2 94 % Filed at 04/24/2024 0749      Patient's Most Recent Weight    Flowsheet Row Most Recent Value   Patient Weight 112 kg (247 lb)      CPAP Settings (Inpatient)    Flowsheet Row Most Recent Value   Mode AutoPAP   Interface Patient provided mask      Lab Results Last 24 Hours    No matching results found     Microbiology Results (All)     None         H&P - H&P Note      H&P signed by Jennifer Bullock DO at 4/19/2024 11:50 AM  Version 1 of 1    Author: Jennifer Bullock DO Service: Internal Medicine Author Type: Physician    Filed: 4/19/2024 11:50 AM Date of Service: 4/19/2024 11:46 AM Status: Signed    : Jennifer Bullock DO (Physician)         [facility]       CC: No chief complaint on file.       PCP: Carole Dalal MD    History of Present Illness: Patient is a 83 year old female with PMH sig for DVT, afib on eliquis, RA, CAD,JIAN who presented to the  [facility] for RTSA.Patient was seen and examined post operatively in PACU. She was able to answer questions appropriately and all vitals stable.Was complaining of R shoulder discomfort.Otherwise no CP,SOB, nausea.     PMH  Past Medical History:    Anesthesia complication    stopped breathing pacu    Arthritis    RA    Atherosclerosis of coronary artery    Breast cancer (HCC)    Invasive ductal/lobular carcinoma  left(Invasive mammary 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)    Cardiomyopathy (HCC)    DVT (deep venous thrombosis) (HCC)    Exposure to medical diagnostic radiation    HYPERLIPIDEMIA    Obesity    OTHER DISEASES    bursitis    PMR (polymyalgia rheumatica) (HCC)    Pulmonary embolism (HCC)    Rheumatoid arthritis (HCC)    SLEEP APNEA    severe, , O2 ning 85%, AutoPAP 6-20 [facility]    Sleep apnea        H  Past Surgical History:   Procedure Laterality Date    Appendectomy  1960    Appendectomy      Cholecystectomy      Colonoscopy  2006    colon polyps    Colonoscopy,biopsy  08/29/2011    Performed by VIBHA LEUNG at [facility]    Hip replacement surgery Right     Hx breast cancer      Knee replacement surgery Bilateral     Lumpectomy left  12/15/2012    Invasive ductal/lobular carcinoma (Invasive mammary 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)    Other surgical history  04/15/2014    Lt shoulder s/p reverse shoulder arthroplasty & open biceps tenodesis    Radiation left  2012    Remv cataract extracap,insert lens Right 07/27/2016    Procedure: RIGHT PHACOEMULSIFICATION OF CATARACT WITH INTRAOCULAR LENS IMPLANT [id number];  Surgeon: Kodak Salazar MD;  Location: [facility]    Total hip replacement Bilateral     Total knee replacement Bilateral         ALL:  Allergies   Allergen Reactions    Diflucan [Fluconazole] ANAPHYLAXIS    Erythromycin Base NAUSEA AND VOMITING    Pcn [Penicillins] RASH     No skin blistering no organ involvement         Home Medications:  Outpatient Medications Marked as Taking for the 4/19/24 encounter (Hospital Encounter)   Medication Sig Dispense Refill    oxyCODONE 5 MG Oral Tab Take 1 tablet (5 mg total) by mouth every 4 (four) hours as needed for Pain. 20 tablet 0    traMADol 50 MG Oral Tab Take 1 tablet (50 mg total) by mouth every 6 (six) hours as needed for Pain. 20 tablet 0    senna-docusate 8.6-50 MG Oral Tab Take 2 tablets by mouth every morning. 60 tablet 0    ondansetron (ZOFRAN) 4 mg tablet Take 1 tablet (4 mg total) by mouth every 8 (eight) hours as needed for Nausea. 20 tablet 0    gabapentin 100 MG Oral Cap Take 1 capsule (100 mg total) by mouth every morning.      gabapentin 400 MG Oral Cap Take 1 capsule (400 mg total) by mouth nightly.      FOLIC ACID 1 MG Oral Tab TAKE 1 TABLET EVERY DAY 90 tablet 3    METOPROLOL SUCCINATE ER 25 MG Oral Tablet 24 Hr TAKE 1/2 TABLET EVERY DAY  (BETA  BLOCKER) (Patient taking differently: Take 0.5 tablets (12.5 mg total) by mouth daily.) 45 tablet 1    PREDNISONE 5 MG Oral Tab TAKE 1 TABLET EVERY DAY 90 tablet 0    rosuvastatin (CRESTOR) 10 MG Oral Tab Take 1 tablet (10 mg total) by mouth nightly. (Patient taking differently: Take 0.5 tablets (5 mg total) by mouth nightly.) 90 tablet 3    methotrexate 2.5 MG Oral Tab Take 9 tablets (22.5 mg total) by mouth every 7 days. 10 tabs PO once a week (Patient taking differently: Take 9 tablets (22.5 mg total) by mouth every 7 days. 8 tabs PO once a week on Sunday) 130 tablet 1    apixaban 5 MG Oral Tab Take 1 tablet (5 mg total) by mouth 2 (two) times daily. (Patient taking differently: Take 1 tablet (5 mg total) by mouth 2 (two) times daily. Pt has instructions) 180 tablet 3    Vitamin B-12 1000 MCG Oral Tab Take 1 tablet (1,000 mcg total) by mouth.      ALENDRONATE 70 MG Oral Tab TAKE 1 TABLET ONE TIME WEEKLY 12 tablet 3    EXTRA STRENGTH ACETAMINOPHEN OR Take by mouth.        ergocalciferol 58411 units Oral Cap Take 1 capsule  (50,000 Units total) by mouth once a week. 12 capsule 1    Aspirin 81 MG Oral Tab Take 1 tablet (81 mg total) by mouth daily. Patient has instructions      Cranberry Extract 250 MG Oral Tab Take  by mouth 2 (two) times daily.           Soc Hx  Social History     Tobacco Use    Smoking status: Former     Current packs/day: 0.00     Average packs/day: 3.0 packs/day for 25.0 years (75.0 ttl pk-yrs)     Types: Cigarettes     Start date: 9/3/1955     Quit date: 9/3/1980     Years since quittin.6    Smokeless tobacco: Never   Substance Use Topics    Alcohol use: No     Alcohol/week: 0.0 standard drinks of alcohol        Fam Hx  Family History   Problem Relation Age of Onset    Cancer Mother 70        colon cancer    Clotting Disorder Mother     Other (Other) Mother         unknown type of arthritis    Cancer Maternal Grandfather 70        colon cancer    Other (Other) Sister         colon polyps    Other (rheumatoid arthritis) Sister     Heart Disorder Father     Other (Other) Son         rheumatoid arthritis    Clotting Disorder Brother         DVT/PE in youngest brother    Heart Disorder Brother     Obesity Brother     Diabetes Brother        Review of Systems  Comprehensive ROS reviewed and negative except for what's stated above.     OBJECTIVE:  /55   Pulse 69   Temp 97.4 °F (36.3 °C)   Resp 10   Ht 5' 8\" (1.727 m)   Wt 247 lb (112 kg)   SpO2 95%   BMI 37.56 kg/m²   General:  Alert                   Neck: Supple   Lungs:   Clear to auscultation bilaterally.        Heart:  Regular rate and rhythm, S1, S2 normal,    Abdomen:   Soft, non-tender. Bowel sounds normal.   Extremities: R postop shoulder             Diagnostic Data:    CBC/Chem  No results for input(s): \"WBC\", \"HGB\", \"MCV\", \"PLT\", \"BAND\", \"INR\" in the last 168 hours.    Invalid input(s): \"LYM#\", \"MONO#\", \"BASOS#\", \"EOSIN#\"    No results for input(s): \"NA\", \"K\", \"CL\", \"CO2\", \"BUN\", \"CREATSERUM\", \"GLU\", \"CA\", \"CAION\", \"MG\", \"PHOS\" in the last  168 hours.    No results for input(s): \"ALT\", \"AST\", \"ALB\", \"AMYLASE\", \"LIPASE\", \"LDH\" in the last 168 hours.    Invalid input(s): \"ALPHOS\", \"TBIL\", \"DBIL\", \"TPROT\"    No results for input(s): \"TROP\" in the last 168 hours.    Radiology: XR SHOULDER, COMPLETE (MIN 2 VIEWS), RIGHT (CPT=73030)    Result Date: 4/19/2024  CONCLUSION: Status post total reverse right shoulder arthroplasty.    Dictated by (CST): Blane Haro MD on 4/19/2024 at 11:10 AM     Finalized by (CST): Blane Haro MD on 4/19/2024 at 11:11 AM             ASSESSMENT / PLAN:   Patient is a 83 year old female with PMH sig for DVT, afib on eliquis, RA, CAD,JIAN who presented to the hospital for RTSA.    R rotator cuff tear arthropathy  - s/p RTSA POD # 0,4/19  - prn pain medication   - monitor respiratory status  - encouraged IS use  - prn anti emetics  - CBC in the morning  - OT/SW, per patient plan is for SNF after surgery  - dvt ppx: eliquis  - rest of management per ortho    pAF  Secondary hypercoagulable state  - resume eliquis and metoprolol    DVT  - eliquis    RA  - holding MTX,resume in 1 week  - prednisone resumed    CAD  - resume ASA when okay with ortho  - resume statin    JIAN  - CPAP    FN:  - IVF: in pacu  - Diet: NPO    DVT Prophy: eliquis  Lines: PIV    Dispo: pending clinical course    Outpatient records or previous hospital records reviewed.     Further recommendations pending patient's clinical course.  [facility] hospitalist to continue to follow patient while in house    Patient and/or patient's family given opportunity to ask questions and note understanding and agreeing with therapeutic plan as outlined    Thank You,  Jennifer Bullock DO    Hospitalist with [facility] [employer] [facility] Service number: [phone]      Electronically signed by Jennifer Bullock DO on 4/19/2024 11:50 AM           D/C Summary    No notes of this type exist for this encounter.        Physical Therapy Notes (last 72 hours)      Physical  Therapy Note signed by Dwayne Rosales PTA at 2024 11:28 AM  Version 1 of 1    Author: Dwayne Rosales PTA Service: Rehab Author Type: Physical Therapist    Filed: 2024 11:28 AM Date of Service: 2024 11:25 AM Status: Signed    : Dwayne Rosales PTA (Physical Therapist)       PHYSICAL THERAPY TREATMENT NOTE - INPATIENT     Room Number: 427/427-A       Presenting Problem: R TSA       Problem List  Active Problems:    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    Patient continues to function below baseline with bed mobility.  Contributing factors to remaining limitations include decreased functional strength, decreased endurance/aerobic capacity, and pain.  Next session anticipate patient to progress bed mobility, transfers, and gait.  Physical Therapy will continue to follow patient for duration of hospitalization.    Patient continues to benefit from continued skilled PT services: to promote return to prior level of function and safety with continuous assistance and gradual rehabilitative therapy .    PLAN  PT Treatment Plan: Bed mobility;Body mechanics;Endurance;Gait training  Frequency (Obs): 5x/week    SUBJECTIVE  Pt reports being ready for PT RX    OBJECTIVE  Precautions: Limb alert - right    WEIGHT BEARING RESTRICTION  R Upper Extremity: Non-Weight Bearing             PAIN ASSESSMENT   Ratin  Location: R shoulder  Management Techniques: Activity promotion;Body mechanics;Relaxation;Repositioning    BALANCE  Static Sitting: Fair +  Dynamic Sitting: Fair  Static Standing: Fair -  Dynamic Standing: Poor +    ACTIVITY TOLERANCE                          O2 WALK       AM-PAC '6-Clicks' INPATIENT SHORT FORM - BASIC MOBILITY  How much difficulty does the patient currently have...  Patient Difficulty: Turning over in bed (including  adjusting bedclothes, sheets and blankets)?: A Little   Patient Difficulty: Sitting down on and standing up from a chair with arms (e.g., wheelchair, bedside commode, etc.): A Lot   Patient Difficulty: Moving from lying on back to sitting on the side of the bed?: A Little   How much help from another person does the patient currently need...   Help from Another: Moving to and from a bed to a chair (including a wheelchair)?: A Little   Help from Another: Need to walk in hospital room?: A Little   Help from Another: Climbing 3-5 steps with a railing?: A Lot     AM-PAC Score:  Raw Score: 16   Approx Degree of Impairment: 54.16%   Standardized Score (AM-PAC Scale): 40.78   CMS Modifier (G-Code): CK    FUNCTIONAL ABILITY STATUS  Functional Mobility/Gait Assessment  Gait Assistance: Minimum assistance  Distance (ft): 2 x 50  Assistive Device:  (hemiwalker)  Pattern: Shuffle  Rolling: minimal assist  Supine to Sit: minimal assist  Sit to Supine: minimal assist  Sit to Stand: minimal assist    Additional information: Pt seen daily.Min a fr bed mobility and transfer.Extra time provided to complete task..EOB sitting balance activity with emphasis on core stabilization.Pt educated on deep breathing and relaxation technique.Pt amb 2 x 50 ft with ivette walker and min a.Provided cuing for gait pattern as well as for postural awareness.Gentle balance activity performed to maximize safety with functional mobility.There ex    The patient's Approx Degree of Impairment: 54.16% has been calculated based on documentation in the [facility] '6 clicks' Inpatient Daily Activity Short Form.  Research supports that patients with this level of impairment may benefit from Sub-acute rehabilitation..  Final disposition will be made by interdisciplinary medical team.    THERAPEUTIC EXERCISES  Lower Extremity Ankle pumps  Glut sets  Hip AB/AD  Heel slides  Quad sets     Position Supine       Patient End of Session: Up in chair;Call light within reach;RN  aware of session/findings;All patient questions and concerns addressed    CURRENT GOALS     Patient Goal Patient's self-stated goal is: go to rehab   Goal #1 Patient is able to demonstrate supine - sit EOB @ level: supervision     Goal #1   Current Status Min a   Goal #2 Patient is able to demonstrate transfers Sit to/from Stand at assistance level: supervision with walker - ivette     Goal #2  Current Status Min A with the ivette walker   Goal #3 Patient is able to ambulate 80 feet with assist device: walker - ivette at assistance level: supervision   Goal #3   Current Status 50'x2 with the ivette walker min A    Goal #4    Goal #4   Current Status    Goal #5 Patient to demonstrate independence with home activity/exercise instructions provided to patient in preparation for discharge.   Goal #5   Current Status IN PROGRESS   Goal #6    Goal #6  Current Status      Gait Training: 15 minutes  Therapeutic Activity: 15 minutes  Neuromuscular Re-education:  minutes  Therapeutic Exercise:  minutes  Canalith Repositioning:  minutes  Manual Therapy:  minutes  Can add/delete any of these         Physical Therapy Note signed by Dwayne Rosales PTA at 4/23/2024 10:01 AM  Version 1 of 1    Author: Dwayne Rosales PTA Service: Rehab Author Type: Physical Therapist    Filed: 4/23/2024 10:01 AM Date of Service: 4/23/2024  9:53 AM Status: Signed    : Dwayne Rosales PTA (Physical Therapist)       PHYSICAL THERAPY TREATMENT NOTE - INPATIENT     Room Number: 427/427-A       Presenting Problem: R TSA       Problem List  Active Problems:    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    Patient continues to function below baseline with bed mobility.  Contributing factors to remaining limitations include decreased functional strength, decreased endurance/aerobic capacity, and  pain.  Next session anticipate patient to progress bed mobility, transfers, and gait.  Physical Therapy will continue to follow patient for duration of hospitalization.    Patient continues to benefit from continued skilled PT services: to promote return to prior level of function and safety with continuous assistance and gradual rehabilitative therapy .    PLAN  PT Treatment Plan: Body mechanics;Endurance;Coordination  Frequency (Obs): 5x/week    SUBJECTIVE  Pt reports being ready for PT RX    OBJECTIVE  Precautions: Limb alert - right    WEIGHT BEARING RESTRICTION  R Upper Extremity: Non-Weight Bearing             PAIN ASSESSMENT   Ratin  Location: R shoulder  Management Techniques: Activity promotion;Body mechanics;Relaxation;Repositioning    BALANCE  Static Sitting: Fair +  Dynamic Sitting: Fair  Static Standing: Fair -  Dynamic Standing: Poor +    ACTIVITY TOLERANCE           BP: 121/80  BP Location: Left arm  BP Method: Automatic  Patient Position: Sitting     O2 WALK       AM-PAC '6-Clicks' INPATIENT SHORT FORM - BASIC MOBILITY  How much difficulty does the patient currently have...  Patient Difficulty: Turning over in bed (including adjusting bedclothes, sheets and blankets)?: A Little   Patient Difficulty: Sitting down on and standing up from a chair with arms (e.g., wheelchair, bedside commode, etc.): A Lot   Patient Difficulty: Moving from lying on back to sitting on the side of the bed?: A Little   How much help from another person does the patient currently need...   Help from Another: Moving to and from a bed to a chair (including a wheelchair)?: A Little   Help from Another: Need to walk in hospital room?: A Little   Help from Another: Climbing 3-5 steps with a railing?: A Lot     AM-PAC Score:  Raw Score: 16   Approx Degree of Impairment: 54.16%   Standardized Score (AM-PAC Scale): 40.78   CMS Modifier (G-Code): CK    FUNCTIONAL ABILITY STATUS  Functional Mobility/Gait Assessment  Gait  Assistance: Minimum assistance  Distance (ft): 2x50  Assistive Device:  (hemiwalker)  Pattern: Shuffle  Rolling: minimal assist  Supine to Sit: minimal assist  Sit to Supine: minimal assist  Sit to Stand: minimal assist    Additional information: Pt seen daily.Min a fr bed mobility and transfer.Extra time provided to complete task..EOB sitting balance activity with emphasis on core stabilization.Pt amb 2 x 50 ft with ivette walker and min a.There ex    The patient's Approx Degree of Impairment: 54.16% has been calculated based on documentation in the [facility] '6 clicks' Inpatient Daily Activity Short Form.  Research supports that patients with this level of impairment may benefit from Sub-acute rehabilitation..  Final disposition will be made by interdisciplinary medical team.    THERAPEUTIC EXERCISES  Lower Extremity Ankle pumps  Glut sets  Hip AB/AD  Heel slides  Quad sets     Position Supine       Patient End of Session: Up in chair;Call light within reach;RN aware of session/findings;All patient questions and concerns addressed    CURRENT GOALS     Patient Goal Patient's self-stated goal is: go to rehab   Goal #1 Patient is able to demonstrate supine - sit EOB @ level: supervision     Goal #1   Current Status Min a   Goal #2 Patient is able to demonstrate transfers Sit to/from Stand at assistance level: supervision with walker - ivette     Goal #2  Current Status Min A with the ivette walker   Goal #3 Patient is able to ambulate 80 feet with assist device: walker - ivette at assistance level: supervision   Goal #3   Current Status 50'x2 with the ivette walker min A    Goal #4    Goal #4   Current Status    Goal #5 Patient to demonstrate independence with home activity/exercise instructions provided to patient in preparation for discharge.   Goal #5   Current Status IN PROGRESS   Goal #6    Goal #6  Current Status      Gait Training:  minutes  Therapeutic Activity: 15 minutes  Neuromuscular Re-education:  minutes  Therapeutic  Exercise: 15 minutes  Canalith Repositioning:  minutes  Manual Therapy:  minutes  Can add/delete any of these         Physical Therapy Note signed by Gerard Raymundo PTA at 4/22/2024  1:51 PM  Version 1 of 1    Author: Gerard Raymundo PTA Service: Rehab Author Type: Physical Therapy Assistant    Filed: 4/22/2024  1:51 PM Date of Service: 4/22/2024  1:37 PM Status: Signed    : Gerard Raymundo PTA (Physical Therapy Assistant)       PHYSICAL THERAPY TREATMENT NOTE - INPATIENT     Room Number: 427/427-A       Presenting Problem: R TSA       Problem List  Active Problems:    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    Patient continues to function below baseline with bed mobility, transfers, gait, and standing prolonged periods.  Contributing factors to remaining limitations include decreased functional strength, decreased endurance/aerobic capacity, and decreased muscular endurance.  Next session anticipate patient to progress bed mobility, transfers, gait, and standing prolonged periods.  Physical Therapy will continue to follow patient for duration of hospitalization.    Patient continues to benefit from continued skilled PT services: to promote return to prior level of function and safety with continuous assistance and gradual rehabilitative therapy .    PLAN  PT Treatment Plan: Bed mobility;Body mechanics;Coordination;Endurance;Patient education;Gait training;Strengthening;Transfer training;Balance training  Frequency (Obs): 5x/week    SUBJECTIVE  Pt was agreeable to therapy session.         OBJECTIVE  Precautions: Limb alert - right    WEIGHT BEARING RESTRICTION  R Upper Extremity: Non-Weight Bearing             PAIN ASSESSMENT   Rating:  (did not rate)  Location: R shoulder  Management Techniques: Activity promotion;Body  mechanics;Relaxation;Repositioning    BALANCE  Static Sitting: Fair +  Dynamic Sitting: Fair  Static Standing: Fair -  Dynamic Standing: Poor +    ACTIVITY TOLERANCE                          O2 WALK       AM-PAC '6-Clicks' INPATIENT SHORT FORM - BASIC MOBILITY  How much difficulty does the patient currently have...  Patient Difficulty: Turning over in bed (including adjusting bedclothes, sheets and blankets)?: A Little   Patient Difficulty: Sitting down on and standing up from a chair with arms (e.g., wheelchair, bedside commode, etc.): A Lot   Patient Difficulty: Moving from lying on back to sitting on the side of the bed?: A Little   How much help from another person does the patient currently need...   Help from Another: Moving to and from a bed to a chair (including a wheelchair)?: A Little   Help from Another: Need to walk in hospital room?: A Little   Help from Another: Climbing 3-5 steps with a railing?: A Lot     AM-PAC Score:  Raw Score: 16   Approx Degree of Impairment: 54.16%   Standardized Score (AM-PAC Scale): 40.78   CMS Modifier (G-Code): CK    FUNCTIONAL ABILITY STATUS  Functional Mobility/Gait Assessment  Gait Assistance: Minimum assistance  Distance (ft): 30'x2  Assistive Device: Other (Comment) (ivette walker)  Pattern: Shuffle (narrow JENNIFER)  Rolling:  NT  Supine to Sit:  NT  Sit to Supine:  NT  Sit to Stand: moderate assist    Additional information: Pt is received in the chair and was cleared for therapy session. Pt with R shoulder immobilizer already donned and in place. Pt was able to maintained her NWB status throughout the session. Pt is mod A with sit<>stand transfers with the use of the ivette walker. Pt with good standing balance. Pt was able to AMB about 30'x2 with the ivette walker and was min/CGA for balance. Pt with decreased step length with narrow JENNIFER. Pt is below her functional baseline. Pt was returned back to the room and to sitting in the chair with all needs within reach.     The  patient's Approx Degree of Impairment: 54.16% has been calculated based on documentation in the [facility] '6 clicks' Inpatient Daily Activity Short Form.  Research supports that patients with this level of impairment may benefit from Rehab.  Final disposition will be made by interdisciplinary medical team.        Patient End of Session: Up in chair;Needs met;Call light within reach;RN aware of session/findings;All patient questions and concerns addressed;Bracing education provided per handout    CURRENT GOALS   Goals to be met by: 4/27/24  Patient Goal Patient's self-stated goal is: go to rehab   Goal #1 Patient is able to demonstrate supine - sit EOB @ level: supervision     Goal #1   Current Status NT received in the chair   Goal #2 Patient is able to demonstrate transfers Sit to/from Stand at assistance level: supervision with walker - ivette     Goal #2  Current Status Mod A with the ivette walker   Goal #3 Patient is able to ambulate 80 feet with assist device: walker - ivette at assistance level: supervision   Goal #3   Current Status 30'x2 with the ivette walker CGA/min A    Goal #4    Goal #4   Current Status    Goal #5 Patient to demonstrate independence with home activity/exercise instructions provided to patient in preparation for discharge.   Goal #5   Current Status IN PROGRESS   Goal #6    Goal #6  Current Status        Therapeutic Activity: 23 minutes                Occupational Therapy Notes (last 72 hours)      Occupational Therapy Note signed by Rina Dos Santos OT at 4/22/2024  2:57 PM  Version 1 of 1    Author: Rina Dos Santos OT Service: Rehab Author Type: Occupational Therapist    Filed: 4/22/2024  2:57 PM Date of Service: 4/22/2024  2:30 PM Status: Signed    : Rina Dos Santos OT (Occupational Therapist)       OCCUPATIONAL THERAPY TREATMENT NOTE - INPATIENT    Room Number: 427/427-A     Presenting Problem: R TSA- ok for wrist, hand, elbow; NWB     Problem List  Active  Problems:    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    Patient continues to function below baseline with ADLs and functional mobility.   Contributing factors to remaining limitations include limited reach, pain, limited RUE ROM --immobilized, decreased standing balance.  Next session anticipate patient to progress LE dressing, brace management, functional transfers.  Occupational Therapy will continue to follow patient for duration of hospitalization.    Patient continues to benefit from continued skilled OT services: to promote return to prior level of function and safety with continuous assistance and gradual rehabilitative therapy .     PLAN  OT Treatment Plan: Balance activities;Energy conservation/work simplification techniques;ADL training;Functional transfer training;Endurance training;Patient/Family education;Patient/Family training;Compensatory technique education  OT Device Recommendations: TBD    SUBJECTIVE  \"My arm feels better after adjusting the sling\"     OBJECTIVE  Precautions: Limb alert - right    WEIGHT BEARING RESTRICTION  R Upper Extremity: Non-Weight Bearing    PAIN ASSESSMENT  Ratin    ACTIVITY TOLERANCE  Room air      ACTIVITIES OF DAILY LIVING ASSESSMENT  AM-PAC ‘6-Clicks’ Inpatient Daily Activity Short Form  How much help from another person does the patient currently need…  -   Putting on and taking off regular lower body clothing?: A Lot  -   Bathing (including washing, rinsing, drying)?: A Lot  -   Toileting, which includes using toilet, bedpan or urinal? : A Lot  -   Putting on and taking off regular upper body clothing?: A Lot  -   Taking care of personal grooming such as brushing teeth?: A Little  -   Eating meals?: A Little    AM-PAC Score:  Score: 14  Approx Degree of Impairment: 59.67%  Standardized Score (AM-PAC Scale):  33.39  CMS Modifier (G-Code): CK    FUNCTIONAL TRANSFER ASSESSMENT  Sit to Stand: Edge of Bed  Edge of Bed: Minimal Assist    BED MOBILITY  Rolling: Not Tested  Supine to Sit : Minimal Assist  Scooting: Min A    BALANCE ASSESSMENT  Static Standing: Minimal Assist    FUNCTIONAL ADL ASSESSMENT  Eating: Stand-by Assist  Grooming Seated: Minimal Assist  Bathing Seated: Minimal Assist  UB Dressing Seated: Moderate Assist  LB Dressing Seated: Moderate Assist  Toileting Seated: Minimal Assist    Skilled Therapy Provided: RN cleared pt for participation in occupational therapy session, which was completed in collaboration with physical therapy. Upon arrival, pt was supine in bed and agreeable to activity. No family was present during session. Pt benefited from additional time, verbal cues to maximize participation.     Pt reported sling feeling uncomfortable -- sling doffed and re-donned while pt was seated eob, readjusted to maximize support. Pt instructed on plan of care, role of therapy. Educated pt on NWB status of RUE, ROM restrictions as well as compensatory strategies for managing (brace management, bed mobility, functional transfers) and additional time discussing positioning strategies.  Pt receptive to education.     EDUCATION PROVIDED  Patient: Role of Occupational Therapy; Plan of Care; Discharge Recommendations; Functional Transfer Techniques; Surgical Precautions; Posture/Positioning; Compensatory ADL Techniques  Patient's Response to Education: Verbalized Understanding    The patient's Approx Degree of Impairment: 59.67% has been calculated based on documentation in the [facility] '6 clicks' Inpatient Daily Activity Short Form.  Research supports that patients with this level of impairment may benefit from rehab.  Final disposition will be made by interdisciplinary medical team.    Patient End of Session: In bed;Needs met;Call light within reach;RN aware of session/findings    OT Goals:    Patient self-stated goal  is: to go to rehab       Patient will complete LE dressing with SBA   Comment:  ongoing    Patient will complete toilet transfer with SBA   Comment:  ongoing     Patient will complete self care task at sink level with SBA    Comment: not tested      Comment:            Goals  on:   Frequency:3x week     OT Session Time: 25 minutes  Therapeutic Activity: 25 minutes             Video Swallow Study Notes    No notes of this type exist for this encounter.     SLP Notes    No notes of this type exist for this encounter.     Immunizations     Name Date      Covid-19 Pfizer 10/26/21     Covid-19 Pfizer 21     Covid-19 Pfizer 21     INFLUENZA defer-21     Deferral: +Patient Refuses Z28.21     INFLUENZA 20     INFLUENZA 19     INFLUENZA 10/18/18     INFLUENZA 10/18/18     INFLUENZA 17     INFLUENZA 17     INFLUENZA 16     INFLUENZA 16     INFLUENZA 09/10/15     INFLUENZA 14     INFLUENZA 14     INFLUENZA 13     INFLUENZA 12     INFLUENZA 11     INFLUENZA 11/01/10     INFLUENZA 09     INFLUENZA 11/10/08     Influenza Virus Vaccine, H1N1 01/20/10     Kenalog Per 10mg Inj 13     Kenalog Per 10mg, Bursa/Joint Inj 07/14/10     Pneumococcal (Prevnar 13) 03/30/15     Pneumococcal (Prevnar 7) 11/01/10     Pneumovax 23 16     TDAP 22     TDAP 12     Zoster Vaccine Recombinant Adjuvanted (Shingrix) 21     Zoster Vaccine Recombinant Adjuvanted (Shingrix) 21       Multidisciplinary Problems     Active Goals     Not on file          Resolved Goals        Problem: Patient/Family Goals    Goal Priority Disciplines Outcome Interventions   Patient/Family Long Term Goal   (Resolved)     Interdisciplinary Adequate for Discharge    Description: Patient's Long Term Goal: ***    Interventions:  - ***  - See additional Care Plan goals for specific interventions   Patient/Family Short Term Goal   (Resolved)      Interdisciplinary Adequate for Discharge    Description: Patient's Short Term Goal: ***    Interventions:   - ***  - See additional Care Plan goals for specific interventions               Discharge Treatment Preferences    Flowsheet Row Most Recent Value   Preferences    Submitted to [facility] Yes   PASRR Level 1 Submitted Yes